# Patient Record
Sex: FEMALE | Race: WHITE | NOT HISPANIC OR LATINO | Employment: FULL TIME | URBAN - METROPOLITAN AREA
[De-identification: names, ages, dates, MRNs, and addresses within clinical notes are randomized per-mention and may not be internally consistent; named-entity substitution may affect disease eponyms.]

---

## 2017-04-19 NOTE — PROGRESS NOTES
Answers for HPI/ROS submitted by the patient on 4/22/2017   Annual Exam:  Getting at least 3 servings of Calcium per day:: Yes  Bi-annual eye exam:: Yes  Dental care twice a year:: Yes  Sleep apnea or symptoms of sleep apnea:: None  Diet:: Regular (no restrictions)  Frequency of exercise:: 2-3 days/week  Taking medications regularly:: Yes  Medication side effects:: Not applicable  Additional concerns today:: No  Q1: Little interest or pleasure in doing things: 0=Not at all  Q2: Feeling down, depressed or hopeless: 0=Not at all  PHQ-2 Score: 0  Duration of exercise:: 15-30 minutes

## 2017-04-25 ENCOUNTER — OFFICE VISIT (OUTPATIENT)
Dept: FAMILY MEDICINE | Facility: CLINIC | Age: 52
End: 2017-04-25
Payer: COMMERCIAL

## 2017-04-25 ENCOUNTER — TELEPHONE (OUTPATIENT)
Dept: FAMILY MEDICINE | Facility: CLINIC | Age: 52
End: 2017-04-25

## 2017-04-25 VITALS
WEIGHT: 135.5 LBS | BODY MASS INDEX: 24.01 KG/M2 | DIASTOLIC BLOOD PRESSURE: 80 MMHG | TEMPERATURE: 99.6 F | HEART RATE: 80 BPM | HEIGHT: 63 IN | SYSTOLIC BLOOD PRESSURE: 110 MMHG | RESPIRATION RATE: 8 BRPM

## 2017-04-25 DIAGNOSIS — R22.41 LEG MASS, RIGHT: ICD-10-CM

## 2017-04-25 DIAGNOSIS — H10.45 CHRONIC ALLERGIC CONJUNCTIVITIS: ICD-10-CM

## 2017-04-25 DIAGNOSIS — Z00.01 ENCOUNTER FOR ROUTINE ADULT HEALTH EXAMINATION WITH ABNORMAL FINDINGS: Primary | ICD-10-CM

## 2017-04-25 DIAGNOSIS — L40.9 PSORIASIS: ICD-10-CM

## 2017-04-25 DIAGNOSIS — Z12.31 ENCOUNTER FOR SCREENING MAMMOGRAM FOR BREAST CANCER: ICD-10-CM

## 2017-04-25 DIAGNOSIS — Z23 NEED FOR PROPHYLACTIC VACCINATION WITH COMBINED VACCINE: ICD-10-CM

## 2017-04-25 DIAGNOSIS — L40.9 PSORIASIS: Primary | ICD-10-CM

## 2017-04-25 LAB
CHOLEST SERPL-MCNC: 164 MG/DL
GLUCOSE SERPL-MCNC: 91 MG/DL (ref 70–99)
HDLC SERPL-MCNC: 50 MG/DL
LDLC SERPL CALC-MCNC: 100 MG/DL
NONHDLC SERPL-MCNC: 114 MG/DL
TRIGL SERPL-MCNC: 72 MG/DL
TSH SERPL DL<=0.005 MIU/L-ACNC: 1.03 MU/L (ref 0.4–4)

## 2017-04-25 PROCEDURE — 36415 COLL VENOUS BLD VENIPUNCTURE: CPT | Performed by: NURSE PRACTITIONER

## 2017-04-25 PROCEDURE — 82947 ASSAY GLUCOSE BLOOD QUANT: CPT | Performed by: NURSE PRACTITIONER

## 2017-04-25 PROCEDURE — 84443 ASSAY THYROID STIM HORMONE: CPT | Performed by: NURSE PRACTITIONER

## 2017-04-25 PROCEDURE — 90715 TDAP VACCINE 7 YRS/> IM: CPT | Performed by: NURSE PRACTITIONER

## 2017-04-25 PROCEDURE — 90471 IMMUNIZATION ADMIN: CPT | Performed by: NURSE PRACTITIONER

## 2017-04-25 PROCEDURE — 99386 PREV VISIT NEW AGE 40-64: CPT | Mod: 25 | Performed by: NURSE PRACTITIONER

## 2017-04-25 PROCEDURE — 80061 LIPID PANEL: CPT | Performed by: NURSE PRACTITIONER

## 2017-04-25 RX ORDER — OLOPATADINE HYDROCHLORIDE 1 MG/ML
SOLUTION/ DROPS OPHTHALMIC
Qty: 1 BOTTLE | Refills: 11 | Status: SHIPPED | OUTPATIENT
Start: 2017-04-25 | End: 2019-03-29

## 2017-04-25 RX ORDER — CLOBETASOL PROPIONATE 0.5 MG/G
OINTMENT TOPICAL
Qty: 45 G | Refills: 1 | Status: SHIPPED | OUTPATIENT
Start: 2017-04-25 | End: 2020-02-17

## 2017-04-25 RX ORDER — DESONIDE 0.5 MG/G
OINTMENT TOPICAL
Qty: 60 G | Refills: 1 | Status: SHIPPED | OUTPATIENT
Start: 2017-04-25 | End: 2017-04-26 | Stop reason: ALTCHOICE

## 2017-04-25 ASSESSMENT — PAIN SCALES - GENERAL: PAINLEVEL: NO PAIN (0)

## 2017-04-25 NOTE — NURSING NOTE
"Chief Complaint   Patient presents with     Physical     Rash on right leg, x2 months     Panel Management     Tdap       Initial /80  Pulse 80  Temp 99.6  F (37.6  C) (Temporal)  Resp 8  Ht 5' 2.6\" (1.59 m)  Wt 135 lb 8 oz (61.5 kg)  BMI 24.31 kg/m2 Estimated body mass index is 24.31 kg/(m^2) as calculated from the following:    Height as of this encounter: 5' 2.6\" (1.59 m).    Weight as of this encounter: 135 lb 8 oz (61.5 kg).  Medication Reconciliation: complete     Columba Dorsey CMA  "

## 2017-04-25 NOTE — TELEPHONE ENCOUNTER
Prior Authorization request received from   pharmacy for  desonide (DESOWEN) 0.05 % ointment .     Insurance company    phone # [phone] and member ID# [id number].    Would you like to proceed with prior authorization or change medication?     Rationale for PA request?    What medications has patient tried & failed?

## 2017-04-25 NOTE — NURSING NOTE
Prior to injection verified patient identity using patient's name and date of birth.  Screening Questionnaire for Adult Immunization    Are you sick today?   No   Do you have allergies to medications, food, a vaccine component or latex?   No   Have you ever had a serious reaction after receiving a vaccination?   No   Do you have a long-term health problem with heart disease, lung disease, asthma, kidney disease, metabolic disease (e.g. diabetes), anemia, or other blood disorder?   No   Do you have cancer, leukemia, HIV/AIDS, or any other immune system problem?   No   In the past 3 months, have you taken medications that affect  your immune system, such as prednisone, other steroids, or anticancer drugs; drugs for the treatment of rheumatoid arthritis, Crohn s disease, or psoriasis; or have you had radiation treatments?   No   Have you had a seizure, or a brain or other nervous system problem?   No   During the past year, have you received a transfusion of blood or blood     products, or been given immune (gamma) globulin or antiviral drug?   No   For women: Are you pregnant or is there a chance you could become        pregnant during the next month?   No   Have you received any vaccinations in the past 4 weeks?   No     Immunization questionnaire answers were all negative.      MNVFC doesn't apply on this patient    Per orders of Soha Lilly DNP, injection of Tdap given by Aleyda Henry. Patient instructed to remain in clinic for 20 minutes afterwards, and to report any adverse reaction to me immediately.       Screening performed by Aleyda Henry on 4/25/2017 at 8:56 AM.

## 2017-04-25 NOTE — MR AVS SNAPSHOT
After Visit Summary   4/25/2017    Gabriella Jeffries    MRN: [id number]           Patient Information     Date Of Birth          1965        Visit Information        Provider Department      4/25/2017 8:00 AM Soha Lilly APRN [facility]        Today's Diagnoses     Encounter for routine adult health examination with abnormal findings    -  1    Encounter for screening mammogram for breast cancer        Psoriasis        Leg mass, right          Care Instructions      Preventive Health Recommendations  Female Ages 50 - 64    Yearly exam: See your health care provider every year in order to  o Review health changes.   o Discuss preventive care.    o Review your medicines if your doctor has prescribed any.      Get a Pap test every three years (unless you have an abnormal result and your provider advises testing more often).    If you get Pap tests with HPV test, you only need to test every 5 years, unless you have an abnormal result.     You do not need a Pap test if your uterus was removed (hysterectomy) and you have not had cancer.    You should be tested each year for STDs (sexually transmitted diseases) if you're at risk.     Have a mammogram every 1 to 2 years.    Have a colonoscopy at age 50, or have a yearly FIT test (stool test). These exams screen for colon cancer.      Have a cholesterol test every 5 years, or more often if advised.    Have a diabetes test (fasting glucose) every three years. If you are at risk for diabetes, you should have this test more often.     If you are at risk for osteoporosis (brittle bone disease), think about having a bone density scan (DEXA).    Shots: Get a flu shot each year. Get a tetanus shot every 10 years.    Nutrition:     Eat at least 5 servings of fruits and vegetables each day.    Eat whole-grain bread, whole-wheat pasta and brown rice instead of white grains and rice.    Talk to your provider about Calcium and Vitamin D.      Lifestyle    Exercise at least 150 minutes a week (30 minutes a day, 5 days a week). This will help you control your weight and prevent disease.    Limit alcohol to one drink per day.    No smoking.     Wear sunscreen to prevent skin cancer.     See your dentist every six months for an exam and cleaning.    See your eye doctor every 1 to 2 years.          Follow-ups after your visit        Additional Services     DERMATOLOGY REFERRAL       Your provider has referred you to: [facility]: [facility] [phone]   [email or web address]    Please be aware that coverage of these services is subject to the terms and limitations of your health insurance plan.  Call member services at your health plan with any benefit or coverage questions.      Please bring the following with you to your appointment:    (1) Any X-Rays, CTs or MRIs which have been performed.  Contact the facility where they were done to arrange for  prior to your scheduled appointment.    (2) List of current medications  (3) This referral request   (4) Any documents/labs given to you for this referral            GENERAL SURG ADULT REFERRAL       Your provider has referred you to: [facility]: [facility] [phone]   [email or web address]  [facility] [facility] [phone]   [email or web address]    Please be aware that coverage of these services is subject to the terms and limitations of your health insurance plan.  Call member services at your health plan with any benefit or coverage questions.      Please bring the following with you to your appointment:    (1) Any X-Rays, CTs or MRIs which have been performed.  Contact the facility where they were done to arrange for  prior to your scheduled appointment.   (2) List of current  "medications   (3) This referral request   (4) Any documents/labs given to you for this referral                  Future tests that were ordered for you today     Open Future Orders        Priority Expected Expires Ordered    *MA Screening Digital Bilateral Routine  4/25/2018 4/25/2017            Who to contact     If you have questions or need follow up information about today's clinic visit or your schedule please contact [facility] directly at [phone].  Normal or non-critical lab and imaging results will be communicated to you by Network Physicshart, letter or phone within 4 business days after the clinic has received the results. If you do not hear from us within 7 days, please contact the clinic through Netliftt or phone. If you have a critical or abnormal lab result, we will notify you by phone as soon as possible.  Submit refill requests through Vaybee or call your pharmacy and they will forward the refill request to us. Please allow 3 business days for your refill to be completed.          Additional Information About Your Visit        Vaybee Information     Vaybee gives you secure access to your electronic health record. If you see a primary care provider, you can also send messages to your care team and make appointments. If you have questions, please call your primary care clinic.  If you do not have a primary care provider, please call [phone] and they will assist you.        Care EveryWhere ID     This is your Care EveryWhere ID. This could be used by other organizations to access your [place] medical records  [id number]        Your Vitals Were     Pulse Temperature Respirations Height BMI (Body Mass Index)       80 99.6  F (37.6  C) (Temporal) 8 5' 2.6\" (1.59 m) 24.31 kg/m2        Blood Pressure from Last 3 Encounters:   04/25/17 110/80   11/16/16 130/86   06/28/16 138/84    Weight from Last 3 Encounters:   04/25/17 135 lb 8 oz (61.5 kg)   11/16/16 160 lb (72.6 kg)   06/28/16 153 lb 14.4 " oz (69.8 kg)              We Performed the Following     DERMATOLOGY REFERRAL     GENERAL SURG ADULT REFERRAL     Glucose     Lipid panel reflex to direct LDL     TSH with free T4 reflex        Primary Care Provider Office Phone # Fax #    Erika Delgado [phone]       [facility] [address] [facility] [address]        Thank you!     Thank you for choosing [facility]  for your care. Our goal is always to provide you with excellent care. Hearing back from our patients is one way we can continue to improve our services. Please take a few minutes to complete the written survey that you may receive in the mail after your visit with us. Thank you!             Your Updated Medication List - Protect others around you: Learn how to safely use, store and throw away your medicines at www.disposemymeds.org.          This list is accurate as of: 4/25/17  8:38 AM.  Always use your most recent med list.                   Brand Name Dispense Instructions for use    benzonatate 100 MG capsule    TESSALON    42 capsule    Take 1 capsule (100 mg) by mouth 3 times daily as needed for cough       clobetasol 0.05 % ointment    TEMOVATE    45 g    Apply  topically 2 times daily as needed. Apply sparingly to affected area.  Do not apply to face.       desonide 0.05 % ointment    DESOWEN    1 Tube    apply BID to affected areas       nystatin 076457 UNIT/ML suspension    MYCOSTATIN    280 mL    Take 5 mLs (500,000 Units) by mouth 4 times daily       olopatadine 0.1 % ophthalmic solution    PATANOL     INSTILL 1 DROP INTO BOTH EYES TWICE A DAY AS NEEDED

## 2017-04-25 NOTE — PROGRESS NOTES
SUBJECTIVE:     CC: Gabriella Jeffries is an 51 year old woman who presents for preventive health visit.     Physical   Annual:     Getting at least 3 servings of Calcium per day::  Yes    Bi-annual eye exam::  Yes    Dental care twice a year::  Yes    Sleep apnea or symptoms of sleep apnea::  None    Diet::  Regular (no restrictions)    Frequency of exercise::  2-3 days/week    Duration of exercise::  15-30 minutes    Taking medications regularly::  Yes    Medication side effects::  Not applicable    Additional concerns today::  No    Patient states she is clean eating. Has cut out gluten, caffeine, and alcohol. Notes that she is eating at least 5 servings of dairy/calcium per day.     Rash on right leg, x 2 months. Has history of psoriasis on feet going up to knee and in groin. States this looks different than psoriasis she has had in the past. Ran out of ointments for psoriasis so has not tried anything. Rash is described as extremely itchy and burning. States she has had biopsies previously and nothing was ever found.     Patient also notes small lump she can feel underneath the skin of right outer thigh. States she has a history of calcifications. Feels this has been present for awhile, however she is unsure of exact length of time. She states she first noticed it a few weeks ago. States at times it is tender, but not painful.    Today's PHQ-2 Score:   PHQ-2 ( 1999 Pfizer) 4/25/2017   Q1: Little interest or pleasure in doing things 0   Q2: Feeling down, depressed or hopeless 0   PHQ-2 Score 0   Little interest or pleasure in doing things -   Feeling down, depressed or hopeless -   PHQ-2 Score -       Abuse: Current or Past(Physical, Sexual or Emotional)- No  Do you feel safe in your environment - Yes    Social History   Substance Use Topics     Smoking status: Never Smoker     Smokeless tobacco: Never Used     Alcohol use Yes      Comment: Social     The patient does not drink >3 drinks per day nor >7 drinks per  week.    Recent Labs   Lab Test  16   1441  08/30/10   0804   CHOL  203*  197   HDL  46*  48*   LDL  126*  125   TRIG  156*  121   CHOLHDLRATIO   --   4.1   NHDL  157*   --        Reviewed orders with patient.  Reviewed health maintenance and updated orders accordingly - Yes    Mammo Decision Support:  Patient over age 50, mutual decision to screen reflected in health maintenance.    Pertinent mammograms are reviewed under the imaging tab.  History of abnormal Pap smear: Status post benign hysterectomy. Health Maintenance and Surgical History updated.    Reviewed and updated as needed this visit by clinical staff  Reviewed and updated as needed this visit by Provider    Past Medical History:   Diagnosis Date     Bowel obstruction (H) 2016     Disorders of bursae and tendons in shoulder region, unspecified 2012     Enteritis      Mixed hyperlipemias      PONV (postoperative nausea and vomiting)      Psoriasis       Past Surgical History:   Procedure Laterality Date     ABDOMEN SURGERY      for bowel obstruction     C/SECTION, LOW TRANSVERSE      , Low Transverse     HYSTERECTOMY, VAGINAL  2006    has ovaries, due to bleeding     SHOULDER SURGERY Right 10/3/14    SAD/DCE     SURGICAL HISTORY OF -   2006    rectocele and TVT     SURGICAL HISTORY OF -       left lumpectomy       ROS:  CONSTITUTIONAL:POSITIVE  for intended weight loss and NEGATIVE  for chills, fatigue and fever   INTEGUMENTARY/SKIN: POSITIVE for pruritis foot bilateral, groin and lower legs bilateral and rash feet bilateral, groin and lower legs bilateral  E: NEGATIVE for vision changes or irritation  ENT: NEGATIVE for ear, mouth and throat problems  R: NEGATIVE for significant cough or SOB  B: NEGATIVE for masses, tenderness or discharge  CV: NEGATIVE for chest pain, palpitations or peripheral edema  GI: NEGATIVE for nausea, abdominal pain, heartburn, or change in bowel habits   female: no unusual urinary symptoms, no unusual  "vaginal symptoms and s/p hysterectomy  M: NEGATIVE for significant arthralgias or myalgia  N: NEGATIVE for weakness, dizziness or paresthesias  ENDOCRINE: POSITIVE  for hair loss  P: NEGATIVE for changes in mood or affect    OBJECTIVE:     /80  Pulse 80  Temp 99.6  F (37.6  C) (Temporal)  Resp 8  Ht 5' 2.6\" (1.59 m)  Wt 135 lb 8 oz (61.5 kg)  BMI 24.31 kg/m2  EXAM:  GENERAL: healthy, alert and no distress  EYES: Eyes grossly normal to inspection, PERRL and conjunctivae and sclerae normal  HENT: ear canals and TM's normal, nose and mouth without ulcers or lesions  NECK: no adenopathy, no asymmetry, masses, or scars and thyroid normal to palpation  RESP: lungs clear to auscultation - no rales, rhonchi or wheezes  BREAST: No discharge, tenderness, no palpable axillary masses or adenopathy and bilateral dense tissue at 12 o'clock  CV: regular rate and rhythm, normal S1 S2, no S3 or S4, no murmur, click or rub, no peripheral edema and peripheral pulses strong  ABDOMEN: soft, nontender, no hepatosplenomegaly, no masses and bowel sounds normal   (female): deferred. Patient declined groin exam to visualize rash.   MS: no gross musculoskeletal defects noted, no edema  SKIN: red plaque - right lower leg, yellow plaques on plantar aspect of bilateral feet,  Small less than 1 cm hardened area felt beneath the skin on upper right exterior thigh, non mobile, non tender, nonerythematous  NEURO: Normal strength and tone, mentation intact and speech normal  PSYCH: mentation appears normal, affect normal/bright    ASSESSMENT/PLAN:         ICD-10-CM    1. Encounter for routine adult health examination with abnormal findings Z00.01 TSH with free T4 reflex     Glucose     Lipid panel reflex to direct LDL   2. Encounter for screening mammogram for breast cancer Z12.31 *MA Screening Digital Bilateral   3. Psoriasis L40.9 DERMATOLOGY REFERRAL     clobetasol (TEMOVATE) 0.05 % ointment     DISCONTINUED: desonide (DESOWEN) 0.05 " "% ointment   4. Leg mass, right R22.41 GENERAL SURG ADULT REFERRAL   5. Chronic allergic conjunctivitis H10.45 olopatadine (PATANOL) 0.1 % ophthalmic solution   6. Need for prophylactic vaccination with combined vaccine Z23 TDAP VACCINE (ADACEL)     ADMIN 1st VACCINE       COUNSELING:  Reviewed preventive health counseling, as reflected in patient instructions  Special attention given to:        Regular exercise       Healthy diet/nutrition       Vaccinated for: TDAP       Osteoporosis Prevention/Bone Health-Encouraged calcium/vitamin D supplementation. Will consider Dexa scan  in the next few years       Colon cancer screening       (Ayana)menopause management  Routine screening for thyroid disorder, diabetes, and lipid disorder done today    Order placed for mammogram     reports that she has never smoked. She has never used smokeless tobacco.    Estimated body mass index is 24.31 kg/(m^2) as calculated from the following:    Height as of this encounter: 5' 2.6\" (1.59 m).    Weight as of this encounter: 135 lb 8 oz (61.5 kg).     Will reorder psoriasis topical medication for patient. Discussed that she may be a candidate for immunomodulator medications to help with psoriasis symptoms. Referral placed for dermatology to discuss these further.    It is unclear if this leg mass represents a calcification or a cyst. Will refer to general surgery to determine if it should be removed.    Will refill patanol for allergic conjunctivitis.     Counseling Resources:  ATP IV Guidelines  Pooled Cohorts Equation Calculator  Breast Cancer Risk Calculator  FRAX Risk Assessment  ICSI Preventive Guidelines  Dietary Guidelines for Americans, 2010  USDA's MyPlate  ASA Prophylaxis  Lung CA Screening    I, Abeba Hill RN, Student Nurse Practitioner, have seen and examined this patient with Soha Lilly, CNP, APRN and am serving as scribe for this visit. She has reviewed and revised documentation as needed.     Abeba Hill RN, " Student Nurse Practitioner    [facility]

## 2017-04-26 NOTE — TELEPHONE ENCOUNTER
Called [employer] to get alternative options. They will cover any of the following:    Hydrocortisone ointment, lotion or cream  Beta methasone augmented cream, lotion, gel or ointment  Beta methasone depropionade cream, lotion or ointment  Beta methasone valorate cream, lotion or ointment.

## 2017-04-28 ENCOUNTER — RADIANT APPOINTMENT (OUTPATIENT)
Dept: MAMMOGRAPHY | Facility: CLINIC | Age: 52
End: 2017-04-28
Attending: NURSE PRACTITIONER
Payer: COMMERCIAL

## 2017-04-28 DIAGNOSIS — Z12.31 ENCOUNTER FOR SCREENING MAMMOGRAM FOR BREAST CANCER: ICD-10-CM

## 2017-04-28 PROCEDURE — G0202 SCR MAMMO BI INCL CAD: HCPCS

## 2017-04-28 PROCEDURE — 77063 BREAST TOMOSYNTHESIS BI: CPT

## 2017-05-09 ENCOUNTER — TELEPHONE (OUTPATIENT)
Dept: FAMILY MEDICINE | Facility: CLINIC | Age: 52
End: 2017-05-09

## 2017-05-09 NOTE — TELEPHONE ENCOUNTER
You placed a referral for patient to Dermatology on 4/25/17.  Patient has not scheduled as of yet.      Please review and forward to team if follow up with the patient is needed.     Thank you!  Maame/Clinic Referrals Dyad II

## 2017-05-09 NOTE — LETTER
[facility]  [address]  [place] [address]  [phone]      May 9, 2017      [address]ella [address] [place] [address]        Dear Gabriella,    We received a notice that you are to be scheduled with a specialty clinic. The referral has been placed by your provider and you can call to schedule an appointment directly.     Enclosed, you will find the referral with the phone number to call to schedule an appointment.    Please call us if you have any questions or concerns.        Sincerely,    [facility] Team/pl

## 2017-09-18 ENCOUNTER — MYC MEDICAL ADVICE (OUTPATIENT)
Dept: FAMILY MEDICINE | Facility: CLINIC | Age: 52
End: 2017-09-18

## 2017-09-18 NOTE — TELEPHONE ENCOUNTER
Gabriella Jeffries is a 52 year old female who calls with Abdominal pain.    NURSING ASSESSMENT:  Description:  Abdominal pain is all alejandro below the belly button, but worse on the left side. Feels like there is a lump there internally.  appetite, increased tiredness, very thirsty, nausea. Painful to pass a BM on Saturday that was formed. Passing gas since . Has known IBS. Has not taken medication for pain, has tried a heating and rest. Feels bloated today, lower abdomin is tender. Denies fevers, diarrhea, constipation, lightheadedness, dizziness, vomiting, urine concerns, dysuria, external lump.   Onset/duration:  Pain started Saturday, run down feeling since   Pain scale (0-10)   1-2/10 cramping that is way worse than that, cramping that radiated through the body  LMP/preg/breast feeding:  No LMP recorded. Patient has had a hysterectomy.  Last exam/Treatment:  2017  Allergies:   Allergies   Allergen Reactions     No Known Drug Allergy      NURSING PLAN: Nursing advice to patient to be seen in 24 hours for lower abdominal pain    RECOMMENDED DISPOSITION:  See in 24 hours - to be evaluated  Will comply with recommendation: Yes  If further questions/concerns or if symptoms do not improve, worsen or new symptoms develop, call your PCP or [facility] as soon as possible.    NOTES:  Disposition was determined by the first positive assessment question, therefore all previous assessment questions were negative    Guideline used:  Telephone Triage Protocols for Nurses, Fifth Edition, Dora Cooper  Abdominal pain, Adult  Nursing Judgment    Yoli Rosenthal RN, BSN

## 2017-09-18 NOTE — PROGRESS NOTES
"  SUBJECTIVE:                                                    Gabriella Jeffries is a 52 year old female who presents to clinic today for the following health issues:    ABDOMINAL   PAIN     Onset: 4 days, off and on for few weeks     Description:   Character: Sharp, Dull ache, Stabbing \"it depends\"   Location: low abdomen, more to the left side, it depends    Radiation: None    Intensity: now is 1/10, \"yesterday I feel like someone cutting me in half\"    Progression of Symptoms:  intermittent    Accompanying Signs & Symptoms:  Fever/Chills?: no   Gas/Bloating: YES- bloating   Nausea: YES  Vomitting: no   Diarrhea?: no   Constipation:no   Dysuria or Hematuria: no    History:   Trauma: no   Previous similar pain: YES   Previous tests done: 1.5 year patient states she had obstruction and had to do surgery     Precipitating factors:   Does the pain change with:     Food: YES- added slowly gluten back to diet last week, patient unsure if this trigger the pain      BM: YES- make it worse     Urination: no     Alleviating factors:  None     Therapies Tried and outcome: none      LMP:  not applicable     GI/Abdomen  The patient states that her LUQ abdominal pain is a lot better today, rated as a 0-1/10. She explains that her pain presented 15-20 minutes after eating out for lunch on Saturday (pretzel, steak bites, and green beans) and felt like someone was \"tearing her apart\", a pain which she rated as 10/10.  Bowel movements have been infrequent and exacerbate her abdominal pain. She states that the pain reduced on Sunday but was still present, especially when she turned over in bed at night. She also explains that her pain is mostly restricted to the LUQ of her abdomen and radiates to her left side. She states that she hasn't eaten a lot since Saturday (today: had an egg and protein shake, yesterday: protein shake and chicken/green beans). She notes that she has had nausea anytime she eats since onset of the abdominal " pain.     Furthermore, she denies fever, weight loss, vomiting, pain when eating, or dysuria.     Family History  The patient states that her father () developed colon cancer when he was in his 80's.     Problem list and histories reviewed & adjusted, as indicated.  Additional history: as documented    Patient Active Problem List   Diagnosis     Psoriasis     CARDIOVASCULAR SCREENING; LDL GOAL LESS THAN 160     Abdominal pain     Vaginal odor     Incontinence     IBS (irritable colon syndrome)     Abnormal LFTs     Disorder of bursae and tendons in shoulder region     Left shoulder pain     Esophageal reflux     H/O: hysterectomy     Past Surgical History:   Procedure Laterality Date     ABDOMEN SURGERY  2016    lysis of adhesions for bowel obstruction     C/SECTION, LOW TRANSVERSE      , Low Transverse     HYSTERECTOMY, VAGINAL      has ovaries, due to bleeding     SHOULDER SURGERY Right 10/3/14    SAD/DCE     SURGICAL HISTORY OF -   2006    rectocele and TVT     SURGICAL HISTORY OF -       left lumpectomy       Social History   Substance Use Topics     Smoking status: Never Smoker     Smokeless tobacco: Never Used     Alcohol use No      Comment: Social     Family History   Problem Relation Age of Onset     HEART DISEASE Mother      defibrillator     Cardiovascular Mother      defibrillator     Cardiovascular Father      valve replacement     Hypertension Father      Eye Disorder Father      maccular degeneration     HEART DISEASE Father      valve replacement     Lipids Father      Genitourinary Problems Brother      CKD     CANCER Brother      leukemia     Asthma No family hx of      C.A.D. No family hx of      DIABETES No family hx of      CEREBROVASCULAR DISEASE No family hx of      Breast Cancer No family hx of      Cancer - colorectal No family hx of      Prostate Cancer No family hx of      Alzheimer Disease No family hx of      Arthritis No family hx of      Blood Disease No  "family hx of      Circulatory No family hx of      GASTROINTESTINAL DISEASE No family hx of      Musculoskeletal Disorder No family hx of      Neurologic Disorder No family hx of      Respiratory No family hx of      Thyroid Disease No family hx of      Alcohol/Drug No family hx of      Allergies No family hx of      Anesthesia Reaction No family hx of      Congenital Anomalies No family hx of      Connective Tissue Disorder No family hx of      Other Cancer No family hx of      Depression No family hx of      Anxiety Disorder No family hx of      MENTAL ILLNESS No family hx of      Substance Abuse No family hx of      OSTEOPOROSIS No family hx of      Genetic Disorder No family hx of      Obesity No family hx of          Current Outpatient Prescriptions   Medication Sig Dispense Refill     betamethasone valerate (VALISONE) 0.1 % ointment Apply topically 2 times daily 60 g 1     olopatadine (PATANOL) 0.1 % ophthalmic solution INSTILL 1 DROP INTO BOTH EYES TWICE A DAY AS NEEDED 1 Bottle 11     clobetasol (TEMOVATE) 0.05 % ointment Apply  topically 2 times daily as needed. Apply sparingly to affected area.  Do not apply to face. 45 g 1     ROS:  Constitutional, neuro, ENT, endocrine, pulmonary, cardiac, gastrointestinal, genitourinary, musculoskeletal, integument and psychiatric systems are negative, except as otherwise noted.    This document serves as a record of the services and decisions personally performed and made by Soha Lilly DNP. It was created on her behalf by Shawn Bowman, a trained medical scribe. The creation of this document is based on the provider's statements to the medical scribe.  Shawn Bowman 12:53 PM September 19, 2017    OBJECTIVE:                                                    /74  Pulse 72  Temp 99.2  F (37.3  C) (Temporal)  Resp 16  Ht 5' 2.5\" (1.588 m)  Wt 127 lb (57.6 kg)  BMI 22.86 kg/m2  Body mass index is 22.86 kg/(m^2).     GENERAL APPEARANCE: healthy, alert and " no distress  HENT: ear canals and TM's normal and nose and mouth without ulcers or lesions  NECK: no adenopathy, no asymmetry, masses, or scars and thyroid normal to palpation  RESP: lungs clear to auscultation - no rales, rhonchi or wheezes  CV: regular rates and rhythm, normal S1 S2, no S3 or S4 and no murmur, click or rub  ABDOMEN: exquisite pain to palpation of RLQ and LUQ otherwise soft, without hepatosplenomegaly or masses and bowel sounds normal  SKIN: no suspicious lesions or to visible skin  NEURO: Normal strength and tone, mentation intact and speech normal  PSYCH: mentation appears normal and affect normal/bright    No results found for this or any previous visit (from the past 24 hour(s)).       ASSESSMENT/PLAN:                                                        ICD-10-CM    1. LLQ abdominal pain R10.32 GASTROENTEROLOGY ADULT REF CONSULT ONLY     CANCELED: CT Abdomen Pelvis w Contrast     CANCELED: CBC with platelets and differential     CANCELED: Lipase     CANCELED: Amylase     CANCELED: Comprehensive metabolic panel     CANCELED: CBC with platelets and differential     CANCELED: Lipase     CANCELED: Amylase     CANCELED: Comprehensive metabolic panel   2. RUQ abdominal pain R10.11        Initially discussed outpt. Work up, but on exam is brought to tears with light/moderate depth palpation. Discussed needing ED evaluation and pt. Is agreeable. She is able to drive by private car. Advised to go straight to ED and remain NPO.   Report given Shaheen Hatfield ( sp?) at [facility] [place] [facility] ED.    Deferring flu vaccine.     GI follow-up is advised.     Follow-up per ED.     The information in this document, created by the medical scribe for me, accurately reflects the services I personally performed and the decisions made by me. I have reviewed and approved this document for accuracy prior to leaving the patient care area.  September 19, 2017 12:17 PM    NUNU [facility]o [facility]

## 2017-09-19 ENCOUNTER — TELEPHONE (OUTPATIENT)
Dept: FAMILY MEDICINE | Facility: CLINIC | Age: 52
End: 2017-09-19

## 2017-09-19 ENCOUNTER — OFFICE VISIT (OUTPATIENT)
Dept: FAMILY MEDICINE | Facility: CLINIC | Age: 52
End: 2017-09-19
Payer: COMMERCIAL

## 2017-09-19 VITALS
WEIGHT: 127 LBS | SYSTOLIC BLOOD PRESSURE: 124 MMHG | TEMPERATURE: 99.2 F | HEIGHT: 63 IN | RESPIRATION RATE: 16 BRPM | BODY MASS INDEX: 22.5 KG/M2 | DIASTOLIC BLOOD PRESSURE: 74 MMHG | HEART RATE: 72 BPM

## 2017-09-19 DIAGNOSIS — R10.11 RUQ ABDOMINAL PAIN: ICD-10-CM

## 2017-09-19 DIAGNOSIS — R10.32 LLQ ABDOMINAL PAIN: Primary | ICD-10-CM

## 2017-09-19 PROCEDURE — 99214 OFFICE O/P EST MOD 30 MIN: CPT | Performed by: NURSE PRACTITIONER

## 2017-09-19 ASSESSMENT — PAIN SCALES - GENERAL: PAINLEVEL: NO PAIN (1)

## 2017-09-19 NOTE — MR AVS SNAPSHOT
After Visit Summary   9/19/2017    Gabriella Jeffries    MRN: [id number]           Patient Information     Date Of Birth          1965        Visit Information        Provider Department      9/19/2017 11:40 AM Soha Lilly APRN CNP [facility]        Today's Diagnoses     LLQ abdominal pain    -  1    RUQ abdominal pain           Follow-ups after your visit        Additional Services     GASTROENTEROLOGY ADULT REF CONSULT ONLY       Preferred Location: [facility] [phone], [facility]: [phone], [facility] [phone] and [facility] [phone]      Please be aware that coverage of these services is subject to the terms and limitations of your health insurance plan.  Call member services at your health plan with any benefit or coverage questions.  Any procedures must be performed at a [facility] facility OR coordinated by your clinic's referral office.    Please bring the following with you to your appointment:    (1) Any X-Rays, CTs or MRIs which have been performed.  Contact the facility where they were done to arrange for  prior to your scheduled appointment.    (2) List of current medications   (3) This referral request   (4) Any documents/labs given to you for this referral                  Who to contact     If you have questions or need follow up information about today's clinic visit or your schedule please contact [facility] directly at [phone].  Normal or non-critical lab and imaging results will be communicated to you by MyChart, letter or phone within 4 business days after the clinic has received the results. If you do not hear from us within 7 days, please contact the clinic through MyChart or phone. If you have a critical or abnormal lab result, we will notify you by phone as soon as possible.  Submit refill requests through [facility] or call your pharmacy and they will forward the refill request to us.  "Please allow 3 business days for your refill to be completed.          Additional Information About Your Visit        MyChart Information     clipsynchart gives you secure access to your electronic health record. If you see a primary care provider, you can also send messages to your care team and make appointments. If you have questions, please call your primary care clinic.  If you do not have a primary care provider, please call [phone] and they will assist you.        Care EveryWhere ID     This is your Care EveryWhere ID. This could be used by other organizations to access your [facility] medical records  [id number]        Your Vitals Were     Pulse Temperature Respirations Height BMI (Body Mass Index)       72 99.2  F (37.3  C) (Temporal) 16 5' 2.5\" (1.588 m) 22.86 kg/m2        Blood Pressure from Last 3 Encounters:   09/19/17 124/74   04/25/17 110/80   11/16/16 130/86    Weight from Last 3 Encounters:   09/19/17 127 lb (57.6 kg)   04/25/17 135 lb 8 oz (61.5 kg)   11/16/16 160 lb (72.6 kg)              We Performed the Following     GASTROENTEROLOGY ADULT REF CONSULT ONLY        Primary Care Provider Office Phone # Fax #    Erika Delgado [phone] [address] [facility] [address]        Equal Access to Services     [facility] LILIANE : Hadii aad ku hadasho Soomaali, waaxda luqadaha, qaybta kaalmada adeegyada, waxay belen haymicaela trevizo. So [facility] [place] [phone].    ATENCIÓN: Si habla español, tiene a pompa disposición servicios gratuitos de asistencia lingüística. Llsameer al [phone].    We comply with applicable federal civil rights laws and [place] laws. We do not discriminate on the basis of race, color, national origin, age, disability sex, sexual orientation or gender identity.            Thank you!     Thank you for choosing [facility]  for your care. Our goal is always to provide you with excellent care. Hearing back from our patients is one way we can " continue to improve our services. Please take a few minutes to complete the written survey that you may receive in the mail after your visit with us. Thank you!             Your Updated Medication List - Protect others around you: Learn how to safely use, store and throw away your medicines at www.disposemymeds.org.          This list is accurate as of: 9/19/17  4:56 PM.  Always use your most recent med list.                   Brand Name Dispense Instructions for use Diagnosis    betamethasone valerate 0.1 % ointment    VALISONE    60 g    Apply topically 2 times daily    Psoriasis       clobetasol 0.05 % ointment    TEMOVATE    45 g    Apply  topically 2 times daily as needed. Apply sparingly to affected area.  Do not apply to face.    Psoriasis       olopatadine 0.1 % ophthalmic solution    PATANOL    1 Bottle    INSTILL 1 DROP INTO BOTH EYES TWICE A DAY AS NEEDED    Chronic allergic conjunctivitis

## 2017-09-19 NOTE — NURSING NOTE
"Chief Complaint   Patient presents with     Panel Management     flu     Abdominal Pain       Initial /74  Pulse 72  Temp 99.2  F (37.3  C) (Temporal)  Resp 16  Ht 5' 2.5\" (1.588 m)  Wt 127 lb (57.6 kg)  BMI 22.86 kg/m2 Estimated body mass index is 22.86 kg/(m^2) as calculated from the following:    Height as of this encounter: 5' 2.5\" (1.588 m).    Weight as of this encounter: 127 lb (57.6 kg).  Medication Reconciliation: complete       Levon Mistry MA    "

## 2017-09-20 NOTE — TELEPHONE ENCOUNTER
Left detailed message informing patient.   Gave her all the numbers to call and schedule an appt - Preferred Location: [facility] [phone], [facility]: [phone], [facility] [phone] and [facility] [phone]

## 2017-09-20 NOTE — TELEPHONE ENCOUNTER
"Spoke with pt.  She states she feels much better today.  She states they took a CT scan and did blood work in the ER.  They told her she had a \"blockage\" which eventually resolved itself.  Pt is interested in the referral to gastroenterology if you feel she still needs to see them.    Will route to Soha Lilly CNP.    Zeny Kc RN    "

## 2017-09-27 ENCOUNTER — TRANSFERRED RECORDS (OUTPATIENT)
Dept: HEALTH INFORMATION MANAGEMENT | Facility: CLINIC | Age: 52
End: 2017-09-27

## 2017-10-11 ENCOUNTER — TRANSFERRED RECORDS (OUTPATIENT)
Dept: HEALTH INFORMATION MANAGEMENT | Facility: CLINIC | Age: 52
End: 2017-10-11

## 2017-12-04 ENCOUNTER — VIRTUAL VISIT (OUTPATIENT)
Dept: FAMILY MEDICINE | Facility: OTHER | Age: 52
End: 2017-12-04

## 2017-12-04 NOTE — PROGRESS NOTES
"Date:   Clinician: Camacho Saldaña  Clinician NPI: [id number]  Patient: Gabriella Jeffries  Patient : 1965  Patient Address: [address] [place] [address], [place] [address]  Patient Phone: [phone]  Visit Protocol: URI  Patient Summary:  Gabriella is a 52 year old ( : 1965 ) female who initiated a Visit for cold, sinus infection, or influenza. When asked the question \"Please sign me up to receive news, health information and promotions. \", Gabriella responded \"No\".   A synchronous visit is necessary because the patient reported the following abnormal symptoms:   Bloody mucus (requires clarification)   Gabriella states her symptoms started gradually 7-9 days ago.   Her symptoms consist of malaise, a headache, nasal congestion, tooth pain, chills, rhinitis, myalgia, and facial pain or pressure.   Symptom Details     Nasal secretions: The color of her mucus is blood-tinged, green, yellow, and bloody.    Facial pain or pressure: The facial pain or pressure feels worse when bending over or leaning forward.     Headache: She states the headache is moderate.     Tooth pain: The tooth pain is not caused by a cavity, recent dental work, or other mouth problems.      Gabriella denies having dyspnea, ear pain, fever, wheezing, cough, and sore throat. She also denies taking antibiotic medication for the symptoms, having recent facial or sinus surgery in the past 60 days, and double sickening.   She has not recently been exposed to someone with influenza. Gabriella has not been in close contact with any high risk individuals.   Weight: 125 lbs   Gabriella does not smoke or use smokeless tobacco.   She denies pregnancy and denies breastfeeding. She does not menstruate.    MEDICATIONS:  No current medications , ALLERGIES:  NKDA   Clinician Response:  Dear Gabriella,  Based on the information you have provided, you likely have  acute bacterial sinusitis, otherwise known as a sinus infection.  I am prescribing amoxicillin-Pot Clavulanate " [Augmentin] 875-125mg. Take one tablet by mouth two times a day for 10 days. There are no refills with this prescription.   Sinus pressure occurs when the tissues lining your sinuses become swollen and inflamed. Afrin nasal spray decreases the swelling to provide the quickest and most effective relief from sinus pressure.  Use oxymetazoline (Afrin or store brand) nasal spray. Spray once in each nostril twice per day for a maximum of 3 days. Using this medication more frequently or longer than recommended may cause nasal congestion to reoccur or worsen. This is an over-the-counter medication you can find at most pharmacies.   Some people develop allergies to antibiotics. If you have significant swelling or difficulty breathing, stop the medication immediately and call 911 or go to an emergency room. If you notice a new rash, be seen at a clinic or urgent care.  Some women may also develop a yeast infection as a side effect of taking antibiotics. If you notice symptoms of a yeast infection, please use [facility] to get treatment.  If you become pregnant during this course of treatment, stop taking the medication and contact your primary care provider.  You will feel better faster if you take care of yourself by getting more rest and drinking plenty of liquids, especially water.  Remember to wash your hands often and stay home while you are sick to decrease the chance you will spread your infection to others.   Diagnosis: Acute bacterial sinusitis  Diagnosis ICD: J01.90  Triage Notes: Patient is getting a sore in her nose that bleeds temporarily. We talked about how to treat this. Patient verified her name and date of birth  Synchronous Triage: phone, status: completed, duration: 132 seconds  Prescription: amoxicillin-Pot Clavulanate (Augmentin) 875-125 mg oral tablet 20 tablets, 10 days supply. Take one tablet by mouth two times a day for 10 days. Refills: 0, Refill as needed: no, Allow substitutions: yes  Pharmacy:  [id number] - [phone] - [address], [place] [address]

## 2018-01-05 ENCOUNTER — MYC MEDICAL ADVICE (OUTPATIENT)
Dept: FAMILY MEDICINE | Facility: CLINIC | Age: 53
End: 2018-01-05

## 2018-01-05 DIAGNOSIS — N64.4 BREAST PAIN, LEFT: Primary | ICD-10-CM

## 2018-01-09 ENCOUNTER — RADIANT APPOINTMENT (OUTPATIENT)
Dept: ULTRASOUND IMAGING | Facility: CLINIC | Age: 53
End: 2018-01-09
Attending: FAMILY MEDICINE
Payer: COMMERCIAL

## 2018-01-09 ENCOUNTER — RADIANT APPOINTMENT (OUTPATIENT)
Dept: MAMMOGRAPHY | Facility: CLINIC | Age: 53
End: 2018-01-09
Attending: FAMILY MEDICINE
Payer: COMMERCIAL

## 2018-01-09 DIAGNOSIS — N64.4 BREAST PAIN, LEFT: ICD-10-CM

## 2018-01-09 PROCEDURE — 77065 DX MAMMO INCL CAD UNI: CPT | Performed by: RADIOLOGY

## 2018-01-09 PROCEDURE — G0279 TOMOSYNTHESIS, MAMMO: HCPCS | Performed by: RADIOLOGY

## 2018-01-09 PROCEDURE — 76641 ULTRASOUND BREAST COMPLETE: CPT | Mod: LT | Performed by: RADIOLOGY

## 2018-04-30 ENCOUNTER — MYC MEDICAL ADVICE (OUTPATIENT)
Dept: FAMILY MEDICINE | Facility: CLINIC | Age: 53
End: 2018-04-30

## 2018-04-30 DIAGNOSIS — L98.9 SKIN LESION: Primary | ICD-10-CM

## 2018-05-02 ENCOUNTER — TRANSFERRED RECORDS (OUTPATIENT)
Dept: HEALTH INFORMATION MANAGEMENT | Facility: CLINIC | Age: 53
End: 2018-05-02

## 2018-08-28 ENCOUNTER — MYC MEDICAL ADVICE (OUTPATIENT)
Dept: FAMILY MEDICINE | Facility: CLINIC | Age: 53
End: 2018-08-28

## 2018-11-20 ENCOUNTER — OFFICE VISIT (OUTPATIENT)
Dept: FAMILY MEDICINE | Facility: CLINIC | Age: 53
End: 2018-11-20
Payer: COMMERCIAL

## 2018-11-20 VITALS
BODY MASS INDEX: 26.58 KG/M2 | OXYGEN SATURATION: 98 % | DIASTOLIC BLOOD PRESSURE: 90 MMHG | RESPIRATION RATE: 18 BRPM | HEART RATE: 92 BPM | HEIGHT: 63 IN | SYSTOLIC BLOOD PRESSURE: 152 MMHG | TEMPERATURE: 98.4 F | WEIGHT: 150 LBS

## 2018-11-20 DIAGNOSIS — J06.9 VIRAL UPPER RESPIRATORY INFECTION: Primary | ICD-10-CM

## 2018-11-20 DIAGNOSIS — R21 EXANTHEM: ICD-10-CM

## 2018-11-20 PROCEDURE — 99213 OFFICE O/P EST LOW 20 MIN: CPT | Performed by: PHYSICIAN ASSISTANT

## 2018-11-20 RX ORDER — OXYMETAZOLINE HYDROCHLORIDE 0.05 G/100ML
2-3 SPRAY NASAL 2 TIMES DAILY PRN
Qty: 1 BOTTLE | Refills: 0 | Status: SHIPPED | OUTPATIENT
Start: 2018-11-20 | End: 2019-03-29

## 2018-11-20 RX ORDER — CODEINE PHOSPHATE AND GUAIFENESIN 10; 100 MG/5ML; MG/5ML
1 SOLUTION ORAL EVERY 4 HOURS PRN
Qty: 120 ML | Refills: 0 | Status: SHIPPED | OUTPATIENT
Start: 2018-11-20 | End: 2019-03-29

## 2018-11-20 ASSESSMENT — PAIN SCALES - GENERAL: PAINLEVEL: MODERATE PAIN (4)

## 2018-11-20 NOTE — PROGRESS NOTES
"  SUBJECTIVE:   Gabriella Jeffries is a 53 year old female who presents to clinic today for the following health issues:      HPI  Acute Illness   Acute illness concerns: Cough, sinus pressure and pain  Onset: 5 days    Fever: no    Chills/Sweats: YES    Headache (location?): YES    Sinus Pressure:YES    Conjunctivitis:  YES: right    Ear Pain: YES: both    Rhinorrhea: YES    Congestion: YES    Sore Throat: YES     Cough: YES    Wheeze: no    Decreased Appetite: YES    Nausea: no    Vomiting: no    Diarrhea:  no    Dysuria/Freq.: no    Fatigue/Achiness: YES    Sick/Strep Exposure: YES- daghter     Therapies Tried and outcome: dayquil. Nyquil. Lots of OTC meds, do not help.     Problem list and histories reviewed & adjusted, as indicated.  Additional history: as documented    ROS:  Constitutional, HEENT, cardiovascular, pulmonary, gi and gu systems are negative, except as otherwise noted.    OBJECTIVE:     /90  Pulse 92  Temp 98.4  F (36.9  C) (Temporal)  Resp 18  Ht 5' 2.5\" (1.588 m)  Wt 150 lb (68 kg)  SpO2 98%  BMI 27 kg/m2  Body mass index is 27 kg/(m^2).  GEN: Well developed, well nourished in NAD.  HEENT: Normocephalic. Eyes: + conjunctival flushing noted. EARS: TMs WNL, Canals clear.  Nose: Edematous mucosa without lesion. Yellow rhinorrhea. Mouth/Pharynx: + cobblestoning and telangiectasia. + Percussed Frontomaxillary Sinus tenderness.  NECK: Supple with No anterior cervical lymphadenopathy.  No Thyromegaly  RESP: CTA with good air entry all fields.  SKIN: Scattered erythematous 2-3 mm excoriated papules on bilat upper extremeties, rare.      Diagnostic Test Results:  none     ASSESSMENT/PLAN:   1. Viral upper respiratory infection  - oxymetazoline (AFRIN NASAL SPRAY) 0.05 % spray; Spray 2-3 sprays into both nostrils 2 times daily as needed for congestion  Dispense: 1 Bottle; Refill: 0  - guaiFENesin-codeine (ROBITUSSIN AC) 100-10 MG/5ML SOLN solution; Take 5 mLs by mouth every 4 hours as needed for " cough  Dispense: 120 mL; Refill: 0    2. Exanthem        Use medication as directed.  Continue supportive OTC measures.  Follow up if symptoms should persist, change or worsen.  Patient amenable to this follow up plan.     Kavon Figueroa PA-C  [facility]

## 2018-11-20 NOTE — MR AVS SNAPSHOT
"              After Visit Summary   11/20/2018    Gabriella Jeffries    MRN: [id number]           Patient Information     Date Of Birth          1965        Visit Information        Provider Department      11/20/2018 11:20 AM Kavon Figueroa PA-C [place] [facility]        Today's Diagnoses     Viral upper respiratory infection    -  1    Exanthem           Follow-ups after your visit        Who to contact     If you have questions or need follow up information about today's clinic visit or your schedule please contact [facility] directly at [phone].  Normal or non-critical lab and imaging results will be communicated to you by K[employer], letter or phone within 4 business days after the clinic has received the results. If you do not hear from us within 7 days, please contact the clinic through [employer] or phone. If you have a critical or abnormal lab result, we will notify you by phone as soon as possible.  Submit refill requests through "[employer] or call your pharmacy and they will forward the refill request to us. Please allow 3 business days for your refill to be completed.          Additional Information About Your Visit        MyChart Information     "[employer] gives you secure access to your electronic health record. If you see a primary care provider, you can also send messages to your care team and make appointments. If you have questions, please call your primary care clinic.  If you do not have a primary care provider, please call [phone] and they will assist you.        Care EveryWhere ID     This is your Care EveryWhere ID. This could be used by other organizations to access your [place] medical records  [id number]        Your Vitals Were     Pulse Temperature Respirations Height Pulse Oximetry BMI (Body Mass Index)    92 98.4  F (36.9  C) (Temporal) 18 5' 2.5\" (1.588 m) 98% 27 kg/m2       Blood Pressure from Last 3 Encounters:   11/20/18 152/90   09/19/17 124/74   04/25/17 " 110/80    Weight from Last 3 Encounters:   11/20/18 150 lb (68 kg)   09/19/17 127 lb (57.6 kg)   04/25/17 135 lb 8 oz (61.5 kg)              Today, you had the following     No orders found for display         Today's Medication Changes          These changes are accurate as of 11/20/18  1:07 PM.  If you have any questions, ask your nurse or doctor.               Start taking these medicines.        Dose/Directions    guaiFENesin-codeine 100-10 MG/5ML Soln solution   Commonly known as:  ROBITUSSIN AC   Used for:  Viral upper respiratory infection   Started by:  Kavon Figueroa PA-C        Dose:  1 tsp.   Take 5 mLs by mouth every 4 hours as needed for cough   Quantity:  120 mL   Refills:  0       oxymetazoline 0.05 % spray   Commonly known as:  AFRIN NASAL SPRAY   Used for:  Viral upper respiratory infection   Started by:  Kavon Figueroa PA-C        Dose:  2-3 spray   Spray 2-3 sprays into both nostrils 2 times daily as needed for congestion   Quantity:  1 Bottle   Refills:  0            Where to get your medicines      These medications were sent to [id number] - [place], [place] - [address] AT [facility] &  ( [facility])  [address] , [place] [address]     Phone:  [phone]     oxymetazoline 0.05 % spray         Some of these will need a paper prescription and others can be bought over the counter.  Ask your nurse if you have questions.     Bring a paper prescription for each of these medications     guaiFENesin-codeine 100-10 MG/5ML Soln solution                Primary Care Provider Office Phone # Fax #    Erika Delgado [phone] [address] [facility] [address]        Equal Access to Services     SALUD MOMIN AH: Dylon williamson Sokenny, wamatiasda obdulia, qaybta kaalmada aderashidda, ange trevizo. So [facility] [phone].    ATENCIÓN: Si habla español, tiene a pompa disposición servicios gratuitos  de asistencia lingüística. Jacquelyn valenzuela [phone].    We comply with applicable federal civil rights laws and [place] laws. We do not discriminate on the basis of race, color, national origin, age, disability, sex, sexual orientation, or gender identity.            Thank you!     Thank you for choosing [facility]  for your care. Our goal is always to provide you with excellent care. Hearing back from our patients is one way we can continue to improve our services. Please take a few minutes to complete the written survey that you may receive in the mail after your visit with us. Thank you!             Your Updated Medication List - Protect others around you: Learn how to safely use, store and throw away your medicines at www.disposemymeds.org.          This list is accurate as of 11/20/18  1:07 PM.  Always use your most recent med list.                   Brand Name Dispense Instructions for use Diagnosis    betamethasone valerate 0.1 % ointment    VALISONE    60 g    Apply topically 2 times daily    Psoriasis       clobetasol 0.05 % ointment    TEMOVATE    45 g    Apply  topically 2 times daily as needed. Apply sparingly to affected area.  Do not apply to face.    Psoriasis       guaiFENesin-codeine 100-10 MG/5ML Soln solution    ROBITUSSIN AC    120 mL    Take 5 mLs by mouth every 4 hours as needed for cough    Viral upper respiratory infection       olopatadine 0.1 % ophthalmic solution    PATANOL    1 Bottle    INSTILL 1 DROP INTO BOTH EYES TWICE A DAY AS NEEDED    Chronic allergic conjunctivitis       oxymetazoline 0.05 % spray    AFRIN NASAL SPRAY    1 Bottle    Spray 2-3 sprays into both nostrils 2 times daily as needed for congestion    Viral upper respiratory infection

## 2018-11-28 ENCOUNTER — MYC MEDICAL ADVICE (OUTPATIENT)
Dept: FAMILY MEDICINE | Facility: CLINIC | Age: 53
End: 2018-11-28

## 2018-11-28 DIAGNOSIS — J20.9 ACUTE BRONCHITIS WITH SYMPTOMS > 10 DAYS: Primary | ICD-10-CM

## 2018-11-28 RX ORDER — AZITHROMYCIN 500 MG/1
500 TABLET, FILM COATED ORAL DAILY
Qty: 3 TABLET | Refills: 0 | Status: SHIPPED | OUTPATIENT
Start: 2018-11-28 | End: 2018-12-04

## 2018-12-04 ENCOUNTER — OFFICE VISIT (OUTPATIENT)
Dept: FAMILY MEDICINE | Facility: CLINIC | Age: 53
End: 2018-12-04
Payer: COMMERCIAL

## 2018-12-04 VITALS
OXYGEN SATURATION: 98 % | WEIGHT: 150 LBS | SYSTOLIC BLOOD PRESSURE: 104 MMHG | HEIGHT: 63 IN | HEART RATE: 79 BPM | RESPIRATION RATE: 16 BRPM | DIASTOLIC BLOOD PRESSURE: 80 MMHG | BODY MASS INDEX: 26.58 KG/M2 | TEMPERATURE: 98.1 F

## 2018-12-04 DIAGNOSIS — K59.00 CONSTIPATION, UNSPECIFIED CONSTIPATION TYPE: ICD-10-CM

## 2018-12-04 DIAGNOSIS — J18.0 BRONCHOPNEUMONIA: Primary | ICD-10-CM

## 2018-12-04 DIAGNOSIS — R10.32 LLQ ABDOMINAL PAIN: ICD-10-CM

## 2018-12-04 LAB
ALBUMIN UR-MCNC: NEGATIVE MG/DL
APPEARANCE UR: CLEAR
BACTERIA #/AREA URNS HPF: ABNORMAL /HPF
BASOPHILS # BLD AUTO: 0 10E9/L (ref 0–0.2)
BASOPHILS NFR BLD AUTO: 0.3 %
BILIRUB UR QL STRIP: NEGATIVE
COLOR UR AUTO: YELLOW
DIFFERENTIAL METHOD BLD: ABNORMAL
EOSINOPHIL # BLD AUTO: 0.2 10E9/L (ref 0–0.7)
EOSINOPHIL NFR BLD AUTO: 1.7 %
ERYTHROCYTE [DISTWIDTH] IN BLOOD BY AUTOMATED COUNT: 13.1 % (ref 10–15)
GLUCOSE UR STRIP-MCNC: NEGATIVE MG/DL
HCT VFR BLD AUTO: 41.1 % (ref 35–47)
HGB BLD-MCNC: 13.8 G/DL (ref 11.7–15.7)
HGB UR QL STRIP: ABNORMAL
KETONES UR STRIP-MCNC: NEGATIVE MG/DL
LEUKOCYTE ESTERASE UR QL STRIP: NEGATIVE
LYMPHOCYTES # BLD AUTO: 2.2 10E9/L (ref 0.8–5.3)
LYMPHOCYTES NFR BLD AUTO: 21.5 %
MCH RBC QN AUTO: 30.6 PG (ref 26.5–33)
MCHC RBC AUTO-ENTMCNC: 33.6 G/DL (ref 31.5–36.5)
MCV RBC AUTO: 91 FL (ref 78–100)
MONOCYTES # BLD AUTO: 0.7 10E9/L (ref 0–1.3)
MONOCYTES NFR BLD AUTO: 7 %
NEUTROPHILS # BLD AUTO: 7 10E9/L (ref 1.6–8.3)
NEUTROPHILS NFR BLD AUTO: 69.5 %
NITRATE UR QL: NEGATIVE
NON-SQ EPI CELLS #/AREA URNS LPF: ABNORMAL /LPF
PH UR STRIP: 7 PH (ref 5–7)
PLATELET # BLD AUTO: 501 10E9/L (ref 150–450)
RBC # BLD AUTO: 4.51 10E12/L (ref 3.8–5.2)
RBC #/AREA URNS AUTO: ABNORMAL /HPF
SOURCE: ABNORMAL
SP GR UR STRIP: <=1.005 (ref 1–1.03)
UROBILINOGEN UR STRIP-ACNC: 0.2 EU/DL (ref 0.2–1)
WBC # BLD AUTO: 10 10E9/L (ref 4–11)
WBC #/AREA URNS AUTO: ABNORMAL /HPF

## 2018-12-04 PROCEDURE — 85025 COMPLETE CBC W/AUTO DIFF WBC: CPT | Performed by: PHYSICIAN ASSISTANT

## 2018-12-04 PROCEDURE — 36415 COLL VENOUS BLD VENIPUNCTURE: CPT | Performed by: PHYSICIAN ASSISTANT

## 2018-12-04 PROCEDURE — 99214 OFFICE O/P EST MOD 30 MIN: CPT | Performed by: PHYSICIAN ASSISTANT

## 2018-12-04 PROCEDURE — 81001 URINALYSIS AUTO W/SCOPE: CPT | Performed by: PHYSICIAN ASSISTANT

## 2018-12-04 RX ORDER — SULFAMETHOXAZOLE/TRIMETHOPRIM 800-160 MG
1 TABLET ORAL 2 TIMES DAILY
Qty: 20 TABLET | Refills: 0 | Status: SHIPPED | OUTPATIENT
Start: 2018-12-04 | End: 2019-03-29

## 2018-12-04 RX ORDER — BENZONATATE 200 MG/1
200 CAPSULE ORAL 3 TIMES DAILY PRN
Qty: 21 CAPSULE | Refills: 0 | Status: SHIPPED | OUTPATIENT
Start: 2018-12-04 | End: 2019-03-29

## 2018-12-04 NOTE — PROGRESS NOTES
"  SUBJECTIVE:   Gabriella Jeffries is a 53 year old female who presents to clinic today for the following health issues:      HPI  Acute Illness   Acute illness concerns: Cough  Onset: over 3 weeks     Fever: no     Chills/Sweats: YES, bot     Headache (location?): YES    Sinus Pressure:YES    Conjunctivitis:  no    Ear Pain: no    Rhinorrhea: YES    Congestion: YES    Sore Throat: YES, from drainage      Cough: YES-productive of green sputum    Wheeze: YES    Decreased Appetite: no     Nausea: no     Vomiting: no     Diarrhea:  no     Dysuria/Freq.: no     Fatigue/Achiness: YES, both     Sick/Strep Exposure: YES, daughter had bronchitis      Therapies Tried and outcome: Robitussin  with codeine , Tylenol     Problem list and histories reviewed & adjusted, as indicated.  Additional history: Patient c/o firm stool difficult to evacuate and left lower quadrant abdominal pain during the last few days.      ROS:  Constitutional, HEENT, cardiovascular, pulmonary, gi and gu systems are negative, except as otherwise noted.    OBJECTIVE:     /80  Pulse 79  Temp 98.1  F (36.7  C) (Temporal)  Resp 16  Ht 5' 2.5\" (1.588 m)  Wt 150 lb (68 kg)  LMP  (LMP Unknown)  SpO2 98%  Breastfeeding? No  BMI 27 kg/m2  Body mass index is 27 kg/(m^2).  GEN: Well developed, well nourished in NAD.  NECK: Supple with No anterior cervical lymphadenopathy.  No Thyromegaly  RESP: + rale in right lower lobe with some crackles and decreased AF bilat bases.  SKIN: No Exanthem noted.  ABD: BSNA.  Tenderness to palpation left lower quadrant, suprapubic and epigastric regions. No rebound.       Diagnostic Test Results:  none     ASSESSMENT/PLAN:   1. Bronchopneumonia  - benzonatate (TESSALON) 200 MG capsule; Take 1 capsule (200 mg) by mouth 3 times daily as needed for cough  Dispense: 21 capsule; Refill: 0  - sulfamethoxazole-trimethoprim (BACTRIM DS/SEPTRA DS) 800-160 MG tablet; Take 1 tablet by mouth 2 times daily  Dispense: 20 tablet; " Refill: 0  - Urine Microscopic    2. Constipation, unspecified constipation type  -OTC Miralax restart.    3. LLQ abdominal pain  - *UA reflex to Microscopic and Culture ([facility] (except [place] and [place])  - CBC with platelets and differential    Use medication as directed.  Follow up if symptoms should persist, change or worsen.  Patient amenable to this follow up plan.     Kavon Figueroa PA-C  [facility]

## 2018-12-04 NOTE — MR AVS SNAPSHOT
"              After Visit Summary   12/4/2018    Gabriella Jeffries    MRN: [id number]           Patient Information     Date Of Birth          1965        Visit Information        Provider Department      12/4/2018 11:40 AM Kavon Figueroa PA-C [facility]        Today's Diagnoses     Bronchopneumonia    -  1    Constipation, unspecified constipation type        LLQ abdominal pain           Follow-ups after your visit        Who to contact     If you have questions or need follow up information about today's clinic visit or your schedule please contact [facility] directly at [phone].  Normal or non-critical lab and imaging results will be communicated to you by [employer], letter or phone within 4 business days after the clinic has received the results. If you do not hear from us within 7 days, please contact the clinic through [employer] or phone. If you have a critical or abnormal lab result, we will notify you by phone as soon as possible.  Submit refill requests through [employer] or call your pharmacy and they will forward the refill request to us. Please allow 3 business days for your refill to be completed.          Additional Information About Your Visit        MyChart Information     [employer] gives you secure access to your electronic health record. If you see a primary care provider, you can also send messages to your care team and make appointments. If you have questions, please call your primary care clinic.  If you do not have a primary care provider, please call [phone] and they will assist you.        Care EveryWhere ID     This is your Care EveryWhere ID. This could be used by other organizations to access your [place] medical records  [id number]        Your Vitals Were     Pulse Temperature Respirations Height Last Period Pulse Oximetry    79 98.1  F (36.7  C) (Temporal) 16 5' 2.5\" (1.588 m) (LMP Unknown) 98%    Breastfeeding? BMI (Body Mass Index)                " No 27 kg/m2           Blood Pressure from Last 3 Encounters:   12/04/18 104/80   11/20/18 152/90   09/19/17 124/74    Weight from Last 3 Encounters:   12/04/18 150 lb (68 kg)   11/20/18 150 lb (68 kg)   09/19/17 127 lb (57.6 kg)              We Performed the Following     *UA reflex to Microscopic and Culture ([facility] (except [place] and [place])     CBC with platelets and differential     Urine Microscopic          Today's Medication Changes          These changes are accurate as of 12/4/18 12:41 PM.  If you have any questions, ask your nurse or doctor.               Start taking these medicines.        Dose/Directions    benzonatate 200 MG capsule   Commonly known as:  TESSALON   Used for:  Bronchopneumonia   Started by:  Kavon Figueroa PA-C        Dose:  200 mg   Take 1 capsule (200 mg) by mouth 3 times daily as needed for cough   Quantity:  21 capsule   Refills:  0       sulfamethoxazole-trimethoprim 800-160 MG tablet   Commonly known as:  BACTRIM DS/SEPTRA DS   Used for:  Bronchopneumonia   Started by:  Kavon Figueroa PA-C        Dose:  1 tablet   Take 1 tablet by mouth 2 times daily   Quantity:  20 tablet   Refills:  0         Stop taking these medicines if you haven't already. Please contact your care team if you have questions.     azithromycin 500 MG tablet   Commonly known as:  ZITHROMAX   Stopped by:  Kavon Figueroa PA-C                Where to get your medicines      These medications were sent to [facility] - [place], [place] - [address] AT [facility] OF [facility] &  ( MAIN)  [address], [place] [address]     Phone:  [phone]     benzonatate 200 MG capsule    sulfamethoxazole-trimethoprim 800-160 MG tablet                Primary Care Provider Office Phone # Fax #    Erika Delgado [phone] [address] [address]        Equal Access to Services     [facility] LILIANE AH: Shimonii genaro wells  clay Jones, wamatiasda luqadaha, qaybta kaalmada joan, ange larsencathryn santhosh. So [facility] [phone].    ATENCIÓN: Si cesarla tai, tiene a pompa disposición servicios gratuitos de asistencia lingüística. Jacquelyn al [phone].    We comply with applicable federal civil rights laws and [place] laws. We do not discriminate on the basis of race, color, national origin, age, disability, sex, sexual orientation, or gender identity.            Thank you!     Thank you for choosing [facility]  for your care. Our goal is always to provide you with excellent care. Hearing back from our patients is one way we can continue to improve our services. Please take a few minutes to complete the written survey that you may receive in the mail after your visit with us. Thank you!             Your Updated Medication List - Protect others around you: Learn how to safely use, store and throw away your medicines at www.disposemymeds.org.          This list is accurate as of 12/4/18 12:41 PM.  Always use your most recent med list.                   Brand Name Dispense Instructions for use Diagnosis    benzonatate 200 MG capsule    TESSALON    21 capsule    Take 1 capsule (200 mg) by mouth 3 times daily as needed for cough    Bronchopneumonia       betamethasone valerate 0.1 % external ointment    VALISONE    60 g    Apply topically 2 times daily    Psoriasis       clobetasol 0.05 % external ointment    TEMOVATE    45 g    Apply  topically 2 times daily as needed. Apply sparingly to affected area.  Do not apply to face.    Psoriasis       guaiFENesin-codeine 100-10 MG/5ML solution    ROBITUSSIN AC    120 mL    Take 5 mLs by mouth every 4 hours as needed for cough    Viral upper respiratory infection       olopatadine 0.1 % ophthalmic solution    PATANOL    1 Bottle    INSTILL 1 DROP INTO BOTH EYES TWICE A DAY AS NEEDED    Chronic allergic conjunctivitis       oxymetazoline 0.05 % nasal spray    AFRIN  NASAL SPRAY    1 Bottle    Spray 2-3 sprays into both nostrils 2 times daily as needed for congestion    Viral upper respiratory infection       sulfamethoxazole-trimethoprim 800-160 MG tablet    BACTRIM DS/SEPTRA DS    20 tablet    Take 1 tablet by mouth 2 times daily    Bronchopneumonia

## 2019-01-04 ENCOUNTER — ANCILLARY PROCEDURE (OUTPATIENT)
Dept: MAMMOGRAPHY | Facility: CLINIC | Age: 54
End: 2019-01-04
Payer: COMMERCIAL

## 2019-01-04 DIAGNOSIS — Z12.31 VISIT FOR SCREENING MAMMOGRAM: ICD-10-CM

## 2019-01-04 PROCEDURE — 77067 SCR MAMMO BI INCL CAD: CPT | Performed by: RADIOLOGY

## 2019-01-04 PROCEDURE — 77063 BREAST TOMOSYNTHESIS BI: CPT | Performed by: RADIOLOGY

## 2019-03-07 ENCOUNTER — E-VISIT (OUTPATIENT)
Dept: FAMILY MEDICINE | Facility: CLINIC | Age: 54
End: 2019-03-07
Payer: COMMERCIAL

## 2019-03-07 DIAGNOSIS — J01.90 ACUTE SINUSITIS WITH SYMPTOMS > 10 DAYS: Primary | ICD-10-CM

## 2019-03-07 PROCEDURE — 99444 ZZC PHYSICIAN ONLINE EVALUATION & MANAGEMENT SERVICE: CPT | Performed by: FAMILY MEDICINE

## 2019-03-08 RX ORDER — AMOXICILLIN 875 MG
875 TABLET ORAL 2 TIMES DAILY
Qty: 20 TABLET | Refills: 0 | Status: SHIPPED | OUTPATIENT
Start: 2019-03-08 | End: 2019-03-29

## 2019-03-08 NOTE — PATIENT INSTRUCTIONS
Thank you for choosing us for your care. I have placed an order for a prescription so that you can start treatment. View your full visit summary for details by clicking on the link below. Your pharmacist will able to address any questions you may have about the medication.     If you're not feeling better within 5-7 days, please schedule an appointment.  You can schedule an appointment right here in [place], or call [phone]  If the visit is for the same symptoms as your e-visit, we'll refund the cost of your e-visit if seen within seven days.      Sinusitis (Antibiotic Treatment)    The sinuses are air-filled spaces within the bones of the face. They connect to the inside of the nose. Sinusitis is an inflammation of the tissue that lines the sinuses. Sinusitis can occur during a cold. It can also happen due to allergies to pollens and other particles in the air. Sinusitis can cause symptoms of sinus congestion and a feeling of fullness. A sinus infection causes fever, headache, and facial pain. There is often green or yellow fluid draining from the nose or into the back of the throat (post-nasal drip). You have been given antibiotics to treat this condition.  Home care    Take the full course of antibiotics as instructed. Do not stop taking them, even when you feel better.    Drink plenty of water, hot tea, and other liquids. This may help thin nasal mucus. It also may help your sinuses drain fluids.    Heat may help soothe painful areas of your face. Use a towel soaked in hot water. Or,  the shower and direct the warm spray onto your face. Using a vaporizer along with a menthol rub at night may also help soothe symptoms.     An expectorant with guaifenesin may help thin nasal mucus and help your sinuses drain fluids.    You can use an over-the-counter decongestant, unless a similar medicine was prescribed to you. Nasal sprays work the fastest. Use one that contains phenylephrine or oxymetazoline.  First blow your nose gently. Then use the spray. Do not use these medicines more often than directed on the label. If you do, your symptoms may get worse. You may also take pills that contain pseudoephedrine. Don t use products that combine multiple medicines. This is because side effects may be increased. Read labels. You can also ask the pharmacist for help. (People with high blood pressure should not use decongestants. They can raise blood pressure.)    Over-the-counter antihistamines may help if allergies contributed to your sinusitis.      Do not use nasal rinses or irrigation during an acute sinus infection, unless your healthcare provider tells you to. Rinsing may spread the infection to other areas in your sinuses.    Use acetaminophen or ibuprofen to control pain, unless another pain medicine was prescribed to you. If you have chronic liver or kidney disease or ever had a stomach ulcer, talk with your healthcare provider before using these medicines. (Aspirin should never be taken by anyone under age 18 who is ill with a fever. It may cause severe liver damage.)    Don't smoke. This can make symptoms worse.  Follow-up care  Follow up with your healthcare provider or our staff if you are better in 1 week.  When to seek medical advice  Call your healthcare provider if any of these occur:    Facial pain or headache that gets worse    Stiff neck    Unusual drowsiness or confusion    Swelling of your forehead or eyelids    Vision problems, such as blurred or double vision    Fever of 100.4 F (38 C) or higher, or as directed by your healthcare provider    Seizure    Breathing problems    Symptoms don't go away in 10 days  Prevention  Here are steps you can take to help prevent an infection:    Keep good hand washing habits.    Don t have close contact with people who have sore throats, colds, or other upper respiratory infections.    Don t smoke, and stay away from secondhand smoke.    Stay up to date with of  your vaccines.  Date Last Reviewed: 11/1/2017 2000-2018 [employer]. [address] [place] [address], [place] [address]. All rights reserved. This information is not intended as a substitute for professional medical care. Always follow your healthcare professional's instructions.

## 2019-03-15 ENCOUNTER — MYC MEDICAL ADVICE (OUTPATIENT)
Dept: FAMILY MEDICINE | Facility: CLINIC | Age: 54
End: 2019-03-15

## 2019-03-15 DIAGNOSIS — B37.31 YEAST VAGINITIS: Primary | ICD-10-CM

## 2019-03-15 RX ORDER — FLUCONAZOLE 150 MG/1
150 TABLET ORAL ONCE
Qty: 1 TABLET | Refills: 0 | Status: SHIPPED | OUTPATIENT
Start: 2019-03-15 | End: 2019-03-29

## 2019-03-26 NOTE — PROGRESS NOTES
SUBJECTIVE:   Gabriella Jeffries is a 53 year old female who presents to clinic today for the following health issues:    History of Present Illness     Diet:  Regular (no restrictions)  Frequency of exercise:  2-3 days/week  Duration of exercise:  15-30 minutes  Taking medications regularly:  Yes  Medication side effects:  Not applicable  Additional concerns today:  No    Joint Pain    Onset: 2 months    Description:   Location: Left arm  Character: Dull ache    Intensity: moderate, severe    Progression of Symptoms: worse    Accompanying Signs & Symptoms: Other symptoms: numbness and tingling, weakness in left arm    History: Previous similar pain: YES- tennis elbow of right arm     Precipitating factors: Trauma or overuse: YES- possibly from shoveling    Alleviating factors: Improved by:     Therapies Tried and outcome: ice and ibuprofen- not helping    Patient reports pain with lifting coffee cup, stretching it, reaching behind her head, and it keeps her up at night. Patient is right hand dominant. She doesn't remember hearing a rip or tear as she was shoveling this winter which she relates could have been the cause of her pain. She notes that her left elbow was swollen initially.     Problem list and histories reviewed & adjusted, as indicated.  Additional history: as documented    Gabriella's blood pressure is elevated in clinic to day and she states that her blood pressure usually runs low. Patient reports that she is stressed currently as she has been looking for work since September 2018 when she was laid off. She notes that she was in a higher level job and there aren't companies hiring for that position and so she is starting to consider looking for jobs below where she was. She is not sleeping well and relates that she feels depressed at times. She relates that she feels she could be improved with medication at times.    Denies chest pain or shortness of breath. She reports that she has been having more anxiety  attacks.       Patient Active Problem List   Diagnosis     Psoriasis     CARDIOVASCULAR SCREENING; LDL GOAL LESS THAN 160     Abdominal pain     Vaginal odor     Incontinence     IBS (irritable colon syndrome)     Abnormal LFTs     Disorder of bursae and tendons in shoulder region     Left shoulder pain     Esophageal reflux     H/O: hysterectomy     Left elbow pain     Elevated blood pressure reading without diagnosis of hypertension     Mild major depression (H)     DIANA (generalized anxiety disorder)     Past Surgical History:   Procedure Laterality Date     ABDOMEN SURGERY  2016    lysis of adhesions for bowel obstruction     C/SECTION, LOW TRANSVERSE      , Low Transverse     HYSTERECTOMY, VAGINAL  2006    has ovaries, due to bleeding     SHOULDER SURGERY Right 10/3/14    SAD/DCE     SURGICAL HISTORY OF -   2006    rectocele and TVT     SURGICAL HISTORY OF -       left lumpectomy       Social History     Tobacco Use     Smoking status: Never Smoker     Smokeless tobacco: Never Used   Substance Use Topics     Alcohol use: Yes     Comment: Social / 1 or 2 per week      Family History   Problem Relation Age of Onset     Heart Disease Mother         defibrillator     Cardiovascular Mother         defibrillator     Cardiovascular Father         valve replacement     Hypertension Father      Eye Disorder Father         maccular degeneration     Heart Disease Father         valve replacement     Lipids Father      Genitourinary Problems Brother         CKD     Cancer Brother         leukemia     Asthma No family hx of      C.A.D. No family hx of      Diabetes No family hx of      Cerebrovascular Disease No family hx of      Breast Cancer No family hx of      Cancer - colorectal No family hx of      Prostate Cancer No family hx of      Alzheimer Disease No family hx of      Arthritis No family hx of      Blood Disease No family hx of      Circulatory No family hx of      Gastrointestinal Disease No family  hx of      Musculoskeletal Disorder No family hx of      Neurologic Disorder No family hx of      Respiratory No family hx of      Thyroid Disease No family hx of      Alcohol/Drug No family hx of      Allergies No family hx of      Anesthesia Reaction No family hx of      Congenital Anomalies No family hx of      Connective Tissue Disorder No family hx of      Other Cancer No family hx of      Depression No family hx of      Anxiety Disorder No family hx of      Mental Illness No family hx of      Substance Abuse No family hx of      Osteoporosis No family hx of      Genetic Disorder No family hx of      Obesity No family hx of          Current Outpatient Medications   Medication Sig Dispense Refill     citalopram (CELEXA) 20 MG tablet Take 0.5 tablets (10 mg) by mouth daily for 7 days, THEN 1 tablet (20 mg) daily for 24 days. 30 tablet 0     clobetasol (TEMOVATE) 0.05 % ointment Apply  topically 2 times daily as needed. Apply sparingly to affected area.  Do not apply to face. 45 g 1     Allergies   Allergen Reactions     No Known Drug Allergy      Recent Labs   Lab Test 04/25/17  0850 03/02/16 02/16/16  1441 11/21/12  1541 05/04/12  1016 12/08/11  0941 09/21/11  0831   *  --  126*  --   --   --   --    HDL 50  --  46*  --   --   --   --    TRIG 72  --  156*  --   --   --   --    ALT  --   --   --   --  13 115* 16   CR  --  0.71  --   --   --  0.75 0.76   GFRESTIMATED  --   --   --   --   --  84 82   GFRESTBLACK  --   --   --   --   --  >90 >90   POTASSIUM  --  3.9  --   --   --  4.5 4.2   TSH 1.03  --   --  1.15 1.08  --  1.16      BP Readings from Last 3 Encounters:   03/29/19 142/86   12/04/18 104/80   11/20/18 152/90    Wt Readings from Last 3 Encounters:   03/29/19 70.9 kg (156 lb 6.4 oz)   12/04/18 68 kg (150 lb)   11/20/18 68 kg (150 lb)        ROS:  Constitutional, HEENT, cardiovascular, pulmonary, GI, , musculoskeletal, neuro, skin, endocrine and psych systems are negative, except as otherwise  "noted.    This document serves as a record of the services and decisions personally performed and made by Soha Lilly CNP. It was created on his/her behalf by Talya Chicas, trained medical scribe. The creation of this document is based the provider's statements to the medical scribes.    Tian Chicas 9:51 AM, March 29, 2019  OBJECTIVE:     /86   Pulse 80   Temp 99.7  F (37.6  C) (Temporal)   Resp 18   Ht 1.605 m (5' 3.19\")   Wt 70.9 kg (156 lb 6.4 oz)   LMP  (LMP Unknown)   SpO2 97%   BMI 27.54 kg/m    Body mass index is 27.54 kg/m .     GENERAL: healthy, alert and no distress  HENT: ear canals and TM's normal, nose and mouth without ulcers or lesions  NECK: no adenopathy, no asymmetry, masses, or scars and thyroid normal to palpation  RESP: lungs clear to auscultation - no rales, rhonchi or wheezes  CV: regular rate and rhythm, normal S1 S2, no S3 or S4, no murmur, click or rub, no peripheral edema and peripheral pulses strong  MS: Tender at the left lateral epicondyle, decreased strength with flexion and extension of left forearm, tender at left distal tricep muscle, significantly tender at left distal bicep. There seems to be a lack of partial tendon insertion in the left bicep when stressed, compared to right.  SKIN: no suspicious lesions or rashes  NEURO: Normal strength and tone, mentation intact and speech normal  PSYCH: mentation appears normal, Anxious affect slightly tearful     Diagnostic Test Results:  No results found for this or any previous visit (from the past 24 hour(s)).    ASSESSMENT/PLAN:       ICD-10-CM    1. Left elbow pain M25.522 MR Elbow Left w/o Contrast   2. IDANA (generalized anxiety disorder) F41.1 citalopram (CELEXA) 20 MG tablet   3. Mild major depression (H) F32.0 citalopram (CELEXA) 20 MG tablet   4. Elevated blood pressure reading without diagnosis of hypertension R03.0      Reviewed symptoms and etiology patient presents with today. Advised the patient have " an MRI without contrast of her left elbow; Referral provided for patient to schedule. Tennis elbow brace provided for patient to wear and advised that she not do any lifting with her left arm pending imaging. Discussed that she may need an orthopedics referral pending imaging.    Blood pressure elevated initially in clinic and upon recheck. I appreciate that her mood is contributing to this as it is a significant issue for the patient. Plan to treat mood and will recheck blood pressure in 3 weeks. Discussed mood at length with patient today. Advised starting Celexa and tapering up from 10 mg to 20 mg; Rx provided. Reviewed directions, benefits, and side effects of medication with patient today.     Follow up with PCP in 1 month for mood medication management and blood pressure recheck or prn.     The information in this document, created by the medical scribe for me, accurately reflects the services I personally performed and the decisions made by me. I have reviewed and approved this document for accuracy prior to leaving the patient care area.  Soha Lilly CNP  9:51 AM, 03/29/19    NUNU Brito CNP  [facility]

## 2019-03-29 ENCOUNTER — OFFICE VISIT (OUTPATIENT)
Dept: FAMILY MEDICINE | Facility: CLINIC | Age: 54
End: 2019-03-29
Payer: COMMERCIAL

## 2019-03-29 VITALS
HEART RATE: 80 BPM | DIASTOLIC BLOOD PRESSURE: 86 MMHG | TEMPERATURE: 99.7 F | SYSTOLIC BLOOD PRESSURE: 142 MMHG | RESPIRATION RATE: 18 BRPM | HEIGHT: 63 IN | WEIGHT: 156.4 LBS | OXYGEN SATURATION: 97 % | BODY MASS INDEX: 27.71 KG/M2

## 2019-03-29 DIAGNOSIS — M25.522 LEFT ELBOW PAIN: Primary | ICD-10-CM

## 2019-03-29 DIAGNOSIS — R03.0 ELEVATED BLOOD PRESSURE READING WITHOUT DIAGNOSIS OF HYPERTENSION: ICD-10-CM

## 2019-03-29 DIAGNOSIS — F32.0 MILD MAJOR DEPRESSION (H): ICD-10-CM

## 2019-03-29 DIAGNOSIS — F41.1 GAD (GENERALIZED ANXIETY DISORDER): ICD-10-CM

## 2019-03-29 PROCEDURE — 99214 OFFICE O/P EST MOD 30 MIN: CPT | Performed by: NURSE PRACTITIONER

## 2019-03-29 RX ORDER — CITALOPRAM HYDROBROMIDE 20 MG/1
TABLET ORAL
Qty: 30 TABLET | Refills: 0 | Status: SHIPPED | OUTPATIENT
Start: 2019-03-29 | End: 2019-05-02

## 2019-03-29 ASSESSMENT — ANXIETY QUESTIONNAIRES
7. FEELING AFRAID AS IF SOMETHING AWFUL MIGHT HAPPEN: SEVERAL DAYS
1. FEELING NERVOUS, ANXIOUS, OR ON EDGE: MORE THAN HALF THE DAYS
3. WORRYING TOO MUCH ABOUT DIFFERENT THINGS: MORE THAN HALF THE DAYS
IF YOU CHECKED OFF ANY PROBLEMS ON THIS QUESTIONNAIRE, HOW DIFFICULT HAVE THESE PROBLEMS MADE IT FOR YOU TO DO YOUR WORK, TAKE CARE OF THINGS AT HOME, OR GET ALONG WITH OTHER PEOPLE: SOMEWHAT DIFFICULT
5. BEING SO RESTLESS THAT IT IS HARD TO SIT STILL: SEVERAL DAYS
6. BECOMING EASILY ANNOYED OR IRRITABLE: MORE THAN HALF THE DAYS
GAD7 TOTAL SCORE: 11
2. NOT BEING ABLE TO STOP OR CONTROL WORRYING: MORE THAN HALF THE DAYS

## 2019-03-29 ASSESSMENT — PAIN SCALES - GENERAL: PAINLEVEL: MILD PAIN (2)

## 2019-03-29 ASSESSMENT — MIFFLIN-ST. JEOR: SCORE: 1291.56

## 2019-03-29 ASSESSMENT — PATIENT HEALTH QUESTIONNAIRE - PHQ9
SUM OF ALL RESPONSES TO PHQ QUESTIONS 1-9: 13
5. POOR APPETITE OR OVEREATING: SEVERAL DAYS

## 2019-03-30 ASSESSMENT — ANXIETY QUESTIONNAIRES: GAD7 TOTAL SCORE: 11

## 2019-04-01 ENCOUNTER — ANCILLARY PROCEDURE (OUTPATIENT)
Dept: MRI IMAGING | Facility: CLINIC | Age: 54
End: 2019-04-01
Attending: NURSE PRACTITIONER
Payer: COMMERCIAL

## 2019-04-01 DIAGNOSIS — M25.522 LEFT ELBOW PAIN: ICD-10-CM

## 2019-04-01 PROCEDURE — 73221 MRI JOINT UPR EXTREM W/O DYE: CPT | Mod: LT | Performed by: RADIOLOGY

## 2019-04-17 ENCOUNTER — MYC MEDICAL ADVICE (OUTPATIENT)
Dept: FAMILY MEDICINE | Facility: CLINIC | Age: 54
End: 2019-04-17

## 2019-04-17 DIAGNOSIS — M77.12 LATERAL EPICONDYLITIS OF LEFT ELBOW: Primary | ICD-10-CM

## 2019-04-24 ENCOUNTER — THERAPY VISIT (OUTPATIENT)
Dept: OCCUPATIONAL THERAPY | Facility: CLINIC | Age: 54
End: 2019-04-24
Payer: COMMERCIAL

## 2019-04-24 DIAGNOSIS — M77.12 LATERAL EPICONDYLITIS OF LEFT ELBOW: ICD-10-CM

## 2019-04-24 DIAGNOSIS — M25.522 LEFT ELBOW PAIN: Primary | ICD-10-CM

## 2019-04-24 PROCEDURE — 97165 OT EVAL LOW COMPLEX 30 MIN: CPT | Mod: GO | Performed by: OCCUPATIONAL THERAPIST

## 2019-04-24 PROCEDURE — 97760 ORTHOTIC MGMT&TRAING 1ST ENC: CPT | Mod: GO | Performed by: OCCUPATIONAL THERAPIST

## 2019-04-24 PROCEDURE — 97110 THERAPEUTIC EXERCISES: CPT | Mod: GO | Performed by: OCCUPATIONAL THERAPIST

## 2019-04-24 NOTE — PROGRESS NOTES
Hand Therapy Initial Evaluation    Current Date:  4/24/2019  Referring Physician:    Diagnosis: Left Lateral Epicondyltis  DOI: 2/2019    Subjective:  Gabriella Jeffries is a 53 year old right hand dominant female.    Patient reports symptoms of pain, stiffness/loss of motion, weakness/loss of strength, edema, numbness and tingling  of the left elbow and wrist which occurred due to shoveling snow. Since onset symptoms are Unchanged  Special tests:  MRI which indicated a partial tear of the extensor tendon.  Previous treatment: aircast.    General health as reported by patient is good.  Pertinent medical history includes:High Blood Pressure, Menopausal  Medical allergies:none.  Surgical history: orthopedic: (R) shoulder.  Medication history: Anti-depressants.    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 but returning to work in 2 weeks  Job Tasks: Computer Work, Prolonged Sitting  Prior functional level:  no limitations  Barriers include:none  Mobility: No difficulty  Transportation: drives  Leisure activities/hobbies: skiing, walking, hiking    Upper Extremity Functional Index Score:  SCORE:   Column Totals: /80: 36   (A lower score indicates greater disability.)    Objective:  Pain Level (Scale 0-10):   4/24/2019   At Rest 2/10   With Use 6/10     Pain Description:   Date 4/24/2019   Location Lateral elbow, lateral upper arm, wrist, hand, ring finger and small finger   Pain Quality Aching, Burning and Sharp   Frequency constant     Pain is worst  same, difficulty sleeping   Exacerbated by  activity, lifting, gripping twisting   Relieved by cold and rest   Progression Gradually worsening      ROM:  Elbow 4/24/19 4/24/19   AROM Right Left   Extension +5 -22++   Flexion 145 140++   Supination 75 80   Pronation 90 85       Wrist 4/24/19 4/24/19   PROM Right Left   Extension 70 75   Flexion 75 55     Resisted Testing   +,++,+++ = pain  Left 4/24/19   Elbow Flexion-sup  +++   Elbow Flexion-neutral ++   Elbow Flexion-pron ++   Elbow Extension -   Pronation +++   Supination +++   Wrist Ext with RD +++   Wrist Ext with UD +++   MCPJ Extension IF, MF +++   MCPJ Extension MF +++   MCPJ Extension 2-5 +++      19   Trials Right Left   1  2  3  Av  32  37  35 10       Elbow ext 19   Trials Right Left   1  2  3  Av 5     Palpation:  Left 19   Lateral Epicondyle +++   PIN +++   Radial Head +++   Cubital tunnel +++   MEP ++     Sensation:  Patient reports recent tingling on the medial side of the forearm radiating down to the RF & SF    Assessment:  Patient presents with symptoms consistent with diagnosis of Lateral Epicondylitis,  with conservative intervention.     Patient's limitations or Problem List includes:  Pain, Decreased ROM/motion, Weakness, Decreased  and Tightness in musculature of the left elbow, wrist, ring finger, small finger and upper arm which interferes with the patient's ability to perform Self Care Tasks (dressing, bathing), Sleep Patterns, Recreational Activities and Household Chores as compared to previous level of function.    Rehab Potential:  Excellent - Return to full activity, no limitations    Patient will benefit from skilled Occupational Therapy to increase ROM, flexibility,  strength and forearm strength and decrease pain to return to previous activity level and resume normal daily tasks and to reach their rehab potential.    Barriers to Learning:  No barrier    Communication Issues:  Patient appears to be able to clearly communicate and understand verbal and written communication and follow directions correctly.    Chart Review: Brief history including review of medical and/or therapy records relating to the presenting problem and Simple history review with patient    Identified Performance Deficits: bathing/showering, dressing, home establishment and management, meal preparation and cleanup, sleep and leisure  activities    Assessment of Occupational Performance:  5 or more Performance Deficits    Clinical Decision Making (Complexity): Low complexity    Treatment Explanation:  The following has been discussed with the patient:    RX ordered/plan of care  Anticipated outcomes  Possible risks and side effects    Plan:  Frequency:  1 X week, once daily  Duration:  for 8 weeks    Treatment Plan:    Modalities:  US and E-Stim   Therapeutic Exercise: AROM of elbow and wrist, PROM with stretch to wrist extensors and flexors,  ECRL strengthening progressing to ECRB strengthening (eccentric progressing to concentric ).   Manual Techniques: Radial head mobilization, transverse friction massage, myofascial release   Neuromuscular:  Nerve gliding, K taping  Orthotic Fabrication:  Wrist cock up orthosis, arm band     Discharge Plan:    Achieve all LTG.  Independent in home treatment program.  Reach maximal therapeutic benefit.    Home Program:   Warmth for stiffness  Ice after activity for pain  PROM with stretch to wrist extensors and flexors  TFM to LEP  MFR to extensors  Wrist cock up orthosis for sleeping  Elbow strap/arm band as needed with activities  Avoid activities that exacerbate pain in the elbow  Lift with forearms in neutral position    Next Visit:  US  MFR/TFM  ECRL strengthening  Trial of K-tape  Nerve glides

## 2019-04-24 NOTE — LETTER
[facility]  [address]  [facility] [address]  [phone]    2019    Re: Gabriella Jeffries   :   1965  MRN:  [id number]   REFERRING PHYSICIAN:   Soha Lilly    [facility]    Date of Initial Evaluation:  ***  Visits:  Rxs Used: 1  Reason for Referral:     Lateral epicondylitis of left elbow  Left elbow pain    EVALUATION SUMMARY    Hand Therapy Initial Evaluation    Current Date:  2019  Referring Physician:    Diagnosis: Left Lateral Epicondyltis  DOI: 2019    Subjective:  Gabriella Jeffries is a 53 year old right hand dominant female.    Patient reports symptoms of pain, stiffness/loss of motion, weakness/loss of strength, edema, numbness and tingling  of the left elbow and wrist which occurred due to shoveling snow. Since onset symptoms are Unchanged  Special tests:  MRI which indicated a partial tear of the extensor tendon.  Previous treatment: aircast.    General health as reported by patient is good.  Pertinent medical history includes:High Blood Pressure, Menopausal  Medical allergies:none.  Surgical history: orthopedic: (R) shoulder.  Medication history: Anti-depressants.    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 but returning to work in 2 weeks  Job Tasks: Computer Work, Prolonged Sitting  Prior functional level:  no limitations  Barriers include:none  Mobility: No difficulty  Transportation: drives  Leisure activities/hobbies: skiing, walking, hiking    Upper Extremity Functional Index Score:  SCORE:   Column Totals: /80: 36   (A lower score indicates greater disability.)    Objective:  Pain Level (Scale 0-10):   2019   At Rest 10   With Use 6/10     Pain Description:   Date 2019   Location Lateral elbow, lateral upper arm, wrist, hand, ring finger and small finger   Pain Quality Aching, Burning and Sharp   Frequency constant     Pain is worst  same, difficulty sleeping    Exacerbated by  activity, lifting, gripping twisting   Relieved by cold and rest   Progression Gradually worsening      ROM:  Elbow 19   AROM Right Left   Extension +5 -22++   Flexion 145 140++   Supination 75 80   Pronation 90 85       Wrist 19   PROM Right Left   Extension 70 75   Flexion 75 55     Resisted Testing   +,++,+++ = pain  Left 19   Elbow Flexion-sup +++   Elbow Flexion-neutral ++   Elbow Flexion-pron ++   Elbow Extension -   Pronation +++   Supination +++   Wrist Ext with RD +++   Wrist Ext with UD +++   MCPJ Extension IF, MF +++   MCPJ Extension MF +++   MCPJ Extension 2-5 +++      19   Trials Right Left   1  2  3  Av  32  37  35 10       Elbow ext 19   Trials Right Left   1  2  3  Av 5     Palpation:  Left 19   Lateral Epicondyle +++   PIN +++   Radial Head +++   Cubital tunnel +++   MEP ++     Sensation:  Patient reports recent tingling on the medial side of the forearm radiating down to the RF & SF    Assessment:  Patient presents with symptoms consistent with diagnosis of Lateral Epicondylitis,  with conservative intervention.     Patient's limitations or Problem List includes:  Pain, Decreased ROM/motion, Weakness, Decreased  and Tightness in musculature of the left elbow, wrist, ring finger, small finger and upper arm which interferes with the patient's ability to perform Self Care Tasks (dressing, bathing), Sleep Patterns, Recreational Activities and Household Chores as compared to previous level of function.    Rehab Potential:  Excellent - Return to full activity, no limitations    Patient will benefit from skilled Occupational Therapy to increase ROM, flexibility,  strength and forearm strength and decrease pain to return to previous activity level and resume normal daily tasks and to reach their rehab potential.    Barriers to Learning:  No barrier    Communication Issues:  Patient appears to be able to clearly  communicate and understand verbal and written communication and follow directions correctly.    Chart Review: Brief history including review of medical and/or therapy records relating to the presenting problem and Simple history review with patient    Identified Performance Deficits: bathing/showering, dressing, home establishment and management, meal preparation and cleanup, sleep and leisure activities    Assessment of Occupational Performance:  5 or more Performance Deficits    Clinical Decision Making (Complexity): Low complexity    Treatment Explanation:  The following has been discussed with the patient:    RX ordered/plan of care  Anticipated outcomes  Possible risks and side effects    Plan:  Frequency:  1 X week, once daily  Duration:  for 8 weeks    Treatment Plan:    Modalities:  US and E-Stim   Therapeutic Exercise: AROM of elbow and wrist, PROM with stretch to wrist extensors and flexors,  ECRL strengthening progressing to ECRB strengthening (eccentric progressing to concentric ).   Manual Techniques: Radial head mobilization, transverse friction massage, myofascial release   Neuromuscular:  Nerve gliding, K taping  Orthotic Fabrication:  Wrist cock up orthosis, arm band     Discharge Plan:    Achieve all LTG.  Independent in home treatment program.  Reach maximal therapeutic benefit.    Home Program:   Warmth for stiffness  Ice after activity for pain  PROM with stretch to wrist extensors and flexors  TFM to LEP  MFR to extensors  Wrist cock up orthosis for sleeping  Elbow strap/arm band as needed with activities  Avoid activities that exacerbate pain in the elbow  Lift with forearms in neutral position    Next Visit:  US  MFR/TFM  ECRL strengthening  Trial of K-tape  Nerve glides    Thank you for your referral.    INQUIRIES  Therapist:    [facility]  [address] [facility] [address]  [facility] [address]  Phone: [phone]  Fax: [phone]

## 2019-04-29 DIAGNOSIS — F41.1 GAD (GENERALIZED ANXIETY DISORDER): ICD-10-CM

## 2019-04-29 DIAGNOSIS — F32.0 MILD MAJOR DEPRESSION (H): ICD-10-CM

## 2019-04-30 NOTE — TELEPHONE ENCOUNTER
Celexa    PHQ-9 SCORE 3/29/2019   PHQ-9 Total Score 13       Routing refill request to provider for review/approval because:  Labs out of range:  PHQ9    Isidra Koroma, RN, BSN

## 2019-05-01 ENCOUNTER — THERAPY VISIT (OUTPATIENT)
Dept: OCCUPATIONAL THERAPY | Facility: CLINIC | Age: 54
End: 2019-05-01
Payer: COMMERCIAL

## 2019-05-01 DIAGNOSIS — M25.522 LEFT ELBOW PAIN: Primary | ICD-10-CM

## 2019-05-01 DIAGNOSIS — M77.10 LATERAL EPICONDYLITIS, UNSPECIFIED LATERALITY: ICD-10-CM

## 2019-05-01 PROCEDURE — 97140 MANUAL THERAPY 1/> REGIONS: CPT | Mod: GO | Performed by: OCCUPATIONAL THERAPIST

## 2019-05-01 PROCEDURE — 97110 THERAPEUTIC EXERCISES: CPT | Mod: GO | Performed by: OCCUPATIONAL THERAPIST

## 2019-05-01 PROCEDURE — 97035 APP MDLTY 1+ULTRASOUND EA 15: CPT | Mod: GO | Performed by: OCCUPATIONAL THERAPIST

## 2019-05-01 NOTE — PROGRESS NOTES
SOAP Note - Hand Therapy - Objective Information    Current Date:  2019  Referring Physician: Soha EDDY CNP    Diagnosis: Left Lateral Epicondyltis  DOI: 2019    Gabriella Jeffries is a 53 year old right hand dominant female.    Patient reports symptoms of pain, stiffness/loss of motion, weakness/loss of strength, edema, numbness and tingling  of the left elbow and wrist which occurred due to shoveling snow.     S:  Subjective changes as noted by patient: The elbow may be a bit better but the upper arm is still painful.  I can't sleep at night due to the pain.  Functional changes noted by patient: no changes      O:  Pain Level (Scale 0-10):   2019   At Rest 2/10 3/10   With Use 6/10 6/10     Pain Description:   Date 2019   Location Lateral elbow, lateral upper arm, wrist, hand, ring finger and small finger   Pain Quality Aching, Burning and Sharp   Frequency constant     Pain is worst  same, difficulty sleeping   Exacerbated by  activity, lifting, gripping twisting   Relieved by cold and rest   Progression Gradually worsening      ROM:  Elbow 19   AROM Right Left   Extension +5 -22++   Flexion 145 140++   Supination 75 80   Pronation 90 85       Wrist 19   PROM Right Left   Extension 70 75   Flexion 75 55     Shoulder 19   AROM(PROM) Right Left   Flexion 150 120   Internal Rotation Lateral buttock Lateral buttock   External Rotation 80 80   Abduction         Resisted Testing   +,++,+++ = pain  Left 19   Elbow Flexion-sup +++   Elbow Flexion-neutral ++   Elbow Flexion-pron ++   Elbow Extension -   Pronation +++   Supination +++   Wrist Ext with RD +++   Wrist Ext with UD +++   MCPJ Extension IF, MF +++   MCPJ Extension MF +++   MCPJ Extension 2-5 +++      19   Trials Right Left   1  2  3  Av  32  37  35 10       Elbow ext 19   Trials Right Left   1  2  3  Av 5     Palpation:  Left 19   Lateral Epicondyle +++     PIN +++    Radial Head +++    Cubital tunnel +++    MEP ++    Proximal bicep  +++     Sensation:  Patient reports recent tingling on the medial side of the forearm radiating down to the RF & SF    Cervical screen: ROM of neck near WNL however clicking present with flex/ext and rotation.  Patient reports pain down her arm with ext and side bending to left side.    Please refer to the daily flowsheet for treatment provided today.     Home Program:   Warmth for stiffness  Ice after activity for pain  PROM with stretch to wrist extensors and flexors  TFM to LEP  MFR to extensors  Wrist cock up orthosis for sleeping  Elbow strap/arm band as needed with activities  Avoid activities that exacerbate pain in the elbow  Lift with forearms in neutral position    Next Visit:  US  MFR/TFM  ECRL strengthening  Trial of K-tape  Nerve glides

## 2019-05-01 NOTE — PROGRESS NOTES
Answers for HPI/ROS submitted by the patient on 5/2/2019   PHQ9 TOTAL SCORE: 9  DIANA 7 TOTAL SCORE: 6    SUBJECTIVE:   Gabriella Jeffries is a 53 year old female who presents to clinic today for the following health issues:      HPI  Depression and Anxiety Follow-Up    Status since last visit: Improved     Other associated symptoms:None  Complicating factors: Answers for HPI/ROS submitted by the patient on 5/2/2019   PHQ9 TOTAL SCORE: 9  DIANA 7 TOTAL SCORE: 6    Significant life event: Yes-      Current substance abuse: None    PHQ 3/29/2019 5/2/2019   PHQ-9 Total Score 13 9   Q9: Thoughts of better off dead/self-harm past 2 weeks Not at all Not at all     DIANA-7 SCORE 3/29/2019 5/2/2019   Total Score - 6 (mild anxiety)   Total Score 11 6       PHQ-9  English  PHQ-9   Any Language  DIANA-7  Suicide Assessment Five-step Evaluation and Treatment (SAFE-T)  Additional HPI:  Patient states she was started on Celexa in March, and it has really been helping.  She reports when she initially started the medication, it made her sleepy; however, that has worn off.  No adverse side effects.  Has been sleeping well.  Improvement noted in PHQ-9 and DIANA scores since onset of medication from 3/29/19 to today.  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    Answers for HPI/ROS submitted by the patient on 5/2/2019   PHQ9 TOTAL SCORE: 9  DIANA 7 TOTAL SCORE: 6      Reviewed and updated as needed this visit by clinical staff  Tobacco  Allergies  Meds  Problems  Med Hx  Surg Hx  Fam Hx  Soc Hx        Reviewed and updated as needed this visit by Provider  Tobacco  Allergies  Meds  Problems  Med Hx  Surg Hx  Fam Hx             Patient Active Problem List   Diagnosis     Psoriasis     CARDIOVASCULAR SCREENING; LDL GOAL LESS THAN 160     Abdominal pain     Vaginal odor     Incontinence     IBS (irritable colon syndrome)     Abnormal LFTs     Disorder of bursae and tendons in shoulder region     Left shoulder pain     Esophageal  reflux     H/O: hysterectomy     Left elbow pain     Lateral epicondylitis, unspecified laterality     Elevated blood pressure reading without diagnosis of hypertension     Mild major depression (H)     DIANA (generalized anxiety disorder)     Past Surgical History:   Procedure Laterality Date     ABDOMEN SURGERY  2016    lysis of adhesions for bowel obstruction     C/SECTION, LOW TRANSVERSE      , Low Transverse     HYSTERECTOMY, VAGINAL      has ovaries, due to bleeding     SHOULDER SURGERY Right 10/3/14    SAD/DCE     SURGICAL HISTORY OF -   2006    rectocele and TVT     SURGICAL HISTORY OF -       left lumpectomy       Social History     Tobacco Use     Smoking status: Never Smoker     Smokeless tobacco: Never Used   Substance Use Topics     Alcohol use: Yes     Comment: Social / 1 or 2 per week      Family History   Problem Relation Age of Onset     Heart Disease Mother         defibrillator     Cardiovascular Mother         defibrillator     Cardiovascular Father         valve replacement     Hypertension Father      Eye Disorder Father         maccular degeneration     Heart Disease Father         valve replacement     Lipids Father      Genitourinary Problems Brother         CKD     Cancer Brother         leukemia     Asthma No family hx of      C.A.D. No family hx of      Diabetes No family hx of      Cerebrovascular Disease No family hx of      Breast Cancer No family hx of      Cancer - colorectal No family hx of      Prostate Cancer No family hx of      Alzheimer Disease No family hx of      Arthritis No family hx of      Blood Disease No family hx of      Circulatory No family hx of      Gastrointestinal Disease No family hx of      Musculoskeletal Disorder No family hx of      Neurologic Disorder No family hx of      Respiratory No family hx of      Thyroid Disease No family hx of      Alcohol/Drug No family hx of      Allergies No family hx of      Anesthesia Reaction No family hx of   "    Congenital Anomalies No family hx of      Connective Tissue Disorder No family hx of      Other Cancer No family hx of      Depression No family hx of      Anxiety Disorder No family hx of      Mental Illness No family hx of      Substance Abuse No family hx of      Osteoporosis No family hx of      Genetic Disorder No family hx of      Obesity No family hx of          Current Outpatient Medications   Medication Sig Dispense Refill     citalopram (CELEXA) 20 MG tablet Take 1 tablet (20 mg) by mouth daily 30 tablet 2     clobetasol (TEMOVATE) 0.05 % ointment Apply  topically 2 times daily as needed. Apply sparingly to affected area.  Do not apply to face. 45 g 1     Allergies   Allergen Reactions     No Known Drug Allergy      BP Readings from Last 3 Encounters:   05/02/19 138/84   03/29/19 142/86   12/04/18 104/80    Wt Readings from Last 3 Encounters:   05/02/19 70.6 kg (155 lb 11.2 oz)   03/29/19 70.9 kg (156 lb 6.4 oz)   12/04/18 68 kg (150 lb)                    ROS:  Constitutional, HEENT, cardiovascular, pulmonary, gi and gu systems are negative, except as otherwise noted.    OBJECTIVE:     /84   Pulse 93   Temp 98.2  F (36.8  C) (Temporal)   Resp 18   Ht 1.6 m (5' 3\")   Wt 70.6 kg (155 lb 11.2 oz)   LMP  (LMP Unknown)   SpO2 96%   Breastfeeding? No   BMI 27.58 kg/m    Body mass index is 27.58 kg/m .  GENERAL: healthy, alert and no distress  NECK: no adenopathy, no asymmetry, masses, or scars and thyroid normal to palpation  RESP: lungs clear to auscultation - no rales, rhonchi or wheezes  CV: regular rate and rhythm, normal S1 S2, no S3 or S4, no murmur, click or rub, no peripheral edema and peripheral pulses strong  ABDOMEN: soft, nontender, no hepatosplenomegaly, no masses and bowel sounds normal  MS: no gross musculoskeletal defects noted, no edema  NEURO: Normal strength and tone, mentation intact and speech normal  PSYCH: mentation appears normal, affect normal/bright and " smiling/making good eye contact    Diagnostic Test Results:  none     ASSESSMENT/PLAN:   1. DIANA (generalized anxiety disorder)  Patient doing well on current medication and dose.  No reported side effects.  Refilled today.  - citalopram (CELEXA) 20 MG tablet; Take 1 tablet (20 mg) by mouth daily  Dispense: 30 tablet; Refill: 2    2. Mild major depression (H)  Patient doing well on current medication and dose.  No reported side effects.  Refilled today.  - citalopram (CELEXA) 20 MG tablet; Take 1 tablet (20 mg) by mouth daily  Dispense: 30 tablet; Refill: 2    Patient Instructions   Take medication as prescribed.    Please call or return to the clinic if symptoms are becoming worse or for any other concerns.    Tanya Cedeno, KAYLIE Cedeno, CNP  [facility]

## 2019-05-02 ENCOUNTER — TELEPHONE (OUTPATIENT)
Dept: FAMILY MEDICINE | Facility: CLINIC | Age: 54
End: 2019-05-02

## 2019-05-02 ENCOUNTER — OFFICE VISIT (OUTPATIENT)
Dept: FAMILY MEDICINE | Facility: CLINIC | Age: 54
End: 2019-05-02
Payer: COMMERCIAL

## 2019-05-02 VITALS
OXYGEN SATURATION: 96 % | HEART RATE: 93 BPM | RESPIRATION RATE: 18 BRPM | SYSTOLIC BLOOD PRESSURE: 138 MMHG | BODY MASS INDEX: 27.59 KG/M2 | WEIGHT: 155.7 LBS | DIASTOLIC BLOOD PRESSURE: 84 MMHG | HEIGHT: 63 IN | TEMPERATURE: 98.2 F

## 2019-05-02 DIAGNOSIS — M54.2 NECK PAIN: Primary | ICD-10-CM

## 2019-05-02 DIAGNOSIS — F41.1 GAD (GENERALIZED ANXIETY DISORDER): ICD-10-CM

## 2019-05-02 DIAGNOSIS — F32.0 MILD MAJOR DEPRESSION (H): ICD-10-CM

## 2019-05-02 PROCEDURE — 99214 OFFICE O/P EST MOD 30 MIN: CPT | Performed by: NURSE PRACTITIONER

## 2019-05-02 RX ORDER — CITALOPRAM HYDROBROMIDE 20 MG/1
20 TABLET ORAL DAILY
Qty: 30 TABLET | Refills: 2 | Status: SHIPPED | OUTPATIENT
Start: 2019-05-02 | End: 2019-09-20

## 2019-05-02 RX ORDER — CITALOPRAM HYDROBROMIDE 20 MG/1
20 TABLET ORAL DAILY
Qty: 30 TABLET | Refills: 0 | Status: SHIPPED | OUTPATIENT
Start: 2019-05-02 | End: 2019-05-02

## 2019-05-02 ASSESSMENT — ANXIETY QUESTIONNAIRES
GAD7 TOTAL SCORE: 6
2. NOT BEING ABLE TO STOP OR CONTROL WORRYING: SEVERAL DAYS
GAD7 TOTAL SCORE: 6
6. BECOMING EASILY ANNOYED OR IRRITABLE: SEVERAL DAYS
7. FEELING AFRAID AS IF SOMETHING AWFUL MIGHT HAPPEN: NOT AT ALL
5. BEING SO RESTLESS THAT IT IS HARD TO SIT STILL: NOT AT ALL
7. FEELING AFRAID AS IF SOMETHING AWFUL MIGHT HAPPEN: NOT AT ALL
3. WORRYING TOO MUCH ABOUT DIFFERENT THINGS: MORE THAN HALF THE DAYS
1. FEELING NERVOUS, ANXIOUS, OR ON EDGE: SEVERAL DAYS
GAD7 TOTAL SCORE: 6
4. TROUBLE RELAXING: SEVERAL DAYS

## 2019-05-02 ASSESSMENT — PATIENT HEALTH QUESTIONNAIRE - PHQ9
SUM OF ALL RESPONSES TO PHQ QUESTIONS 1-9: 9
SUM OF ALL RESPONSES TO PHQ QUESTIONS 1-9: 9

## 2019-05-02 ASSESSMENT — MIFFLIN-ST. JEOR: SCORE: 1285.38

## 2019-05-02 NOTE — PATIENT INSTRUCTIONS
Take medication as prescribed.    Please call or return to the clinic if symptoms are becoming worse or for any other concerns.    Tanya Cedeno, CNP

## 2019-05-02 NOTE — TELEPHONE ENCOUNTER
- Continue Coumadin and titrated dose as necessary.   - She is on primidone at home. 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        ----- Message from Diane Haq OT sent at 5/1/2019 11:36 AM CDT -----  Regarding: PT referral for neck/shoulder  Almas Hoyos,    Thank you for this referral to hand therapy for the above patient.  Patient reports a little bit of progress with the elbow however continues to complain of upper arm pain and difficulty sleeping.  I was hoping the night splint would help with this.  I did a shoulder and cervical screen and found some deficits that might be contributing to her elbow pain.  I feel a referral to PT for neck and shoulder would be beneficial.  I would like to continue with her elbow therapy after their consult since we know from the MRI there is an extensor tear.  If you agree, please place an order for physical therapy.    Thanks   Diane Haq OTR/L,CHT

## 2019-05-03 ASSESSMENT — PATIENT HEALTH QUESTIONNAIRE - PHQ9: SUM OF ALL RESPONSES TO PHQ QUESTIONS 1-9: 9

## 2019-05-03 ASSESSMENT — ANXIETY QUESTIONNAIRES: GAD7 TOTAL SCORE: 6

## 2019-06-10 ENCOUNTER — MYC MEDICAL ADVICE (OUTPATIENT)
Dept: FAMILY MEDICINE | Facility: CLINIC | Age: 54
End: 2019-06-10

## 2019-06-12 ENCOUNTER — OFFICE VISIT (OUTPATIENT)
Dept: FAMILY MEDICINE | Facility: CLINIC | Age: 54
End: 2019-06-12
Payer: COMMERCIAL

## 2019-06-12 VITALS
WEIGHT: 156 LBS | DIASTOLIC BLOOD PRESSURE: 82 MMHG | RESPIRATION RATE: 20 BRPM | TEMPERATURE: 98.8 F | BODY MASS INDEX: 28.71 KG/M2 | HEIGHT: 62 IN | SYSTOLIC BLOOD PRESSURE: 132 MMHG | HEART RATE: 80 BPM

## 2019-06-12 DIAGNOSIS — M25.522 LEFT ELBOW PAIN: ICD-10-CM

## 2019-06-12 DIAGNOSIS — F41.1 GAD (GENERALIZED ANXIETY DISORDER): ICD-10-CM

## 2019-06-12 DIAGNOSIS — I80.8 THROMBOPHLEBITIS ARM: Primary | ICD-10-CM

## 2019-06-12 DIAGNOSIS — F32.0 MILD MAJOR DEPRESSION (H): ICD-10-CM

## 2019-06-12 PROCEDURE — 99214 OFFICE O/P EST MOD 30 MIN: CPT | Performed by: FAMILY MEDICINE

## 2019-06-12 ASSESSMENT — ANXIETY QUESTIONNAIRES
1. FEELING NERVOUS, ANXIOUS, OR ON EDGE: NOT AT ALL
6. BECOMING EASILY ANNOYED OR IRRITABLE: NOT AT ALL
4. TROUBLE RELAXING: NOT AT ALL
7. FEELING AFRAID AS IF SOMETHING AWFUL MIGHT HAPPEN: NOT AT ALL
GAD7 TOTAL SCORE: 0
7. FEELING AFRAID AS IF SOMETHING AWFUL MIGHT HAPPEN: NOT AT ALL
GAD7 TOTAL SCORE: 0
2. NOT BEING ABLE TO STOP OR CONTROL WORRYING: NOT AT ALL
5. BEING SO RESTLESS THAT IT IS HARD TO SIT STILL: NOT AT ALL
3. WORRYING TOO MUCH ABOUT DIFFERENT THINGS: NOT AT ALL
GAD7 TOTAL SCORE: 0

## 2019-06-12 ASSESSMENT — PATIENT HEALTH QUESTIONNAIRE - PHQ9
SUM OF ALL RESPONSES TO PHQ QUESTIONS 1-9: 2
SUM OF ALL RESPONSES TO PHQ QUESTIONS 1-9: 2

## 2019-06-12 ASSESSMENT — PAIN SCALES - GENERAL: PAINLEVEL: NO PAIN (0)

## 2019-06-12 ASSESSMENT — MIFFLIN-ST. JEOR: SCORE: 1277.24

## 2019-06-12 NOTE — PROGRESS NOTES
"Subjective     Gabriella Jeffries is a 53 year old female who presents to clinic today for the following health issues:    HPI   Depression and Anxiety Follow-Up    How are you doing with your depression since your last visit? Improved     How are you doing with your anxiety since your last visit?  Improved     Are you having other symptoms that might be associated with depression or anxiety? No    Have you had a significant life event? No     Do you have any concerns with your use of alcohol or other drugs? No    Social History     Tobacco Use     Smoking status: Never Smoker     Smokeless tobacco: Never Used   Substance Use Topics     Alcohol use: Yes     Comment: Social / 1 or 2 per week      Drug use: No     PHQ 3/29/2019 5/2/2019 6/12/2019   PHQ-9 Total Score 13 9 2   Q9: Thoughts of better off dead/self-harm past 2 weeks Not at all Not at all Not at all     DIANA-7 SCORE 3/29/2019 5/2/2019 6/12/2019   Total Score - 6 (mild anxiety) 0 (minimal anxiety)   Total Score 11 6 0     Suicide Assessment Five-step Evaluation and Treatment (SAFE-T)    Amount of exercise or physical activity: 4-5 days/week for an average of 20-30 minutes    Problems taking medications regularly: No    Medication side effects: none    Diet: regular (no restrictions)      Concern - right forearm  Onset: 3-4 days     Description:   Patient states she noticed one lump on the right arm and today she felt another lump right below it.   Tender to the touch.     Intensity: N/A    Progression of Symptoms:  worsening    Accompanying Signs & Symptoms:      Previous history of similar problem:   On right upper leg \"it been there for a while now \"     Precipitating factors:   Worsened by:     Alleviating factors:  Improved by:     Therapies Tried and outcome: none   The patient states that this weekend she had right arm soreness. She notes that she has a lump that is tender to touch, and noticed another one earlier today. She states that it feels like a " "bruise, but it isn't. She declines any injury or itchiness.   The patient notes that she feels \"weird\".     Left elbow pain  Patient reports having left elbow pain. She notes that she cannot lift a coffee cup due to the pain. She states that the pain \"really hurts\" and her elbow feels \"like cement\".  She reports having an MRI completed and showed a tendon tear. She notes using an elbow strap. She states that she consulted Occupational Therapy with no relief.     Patient Active Problem List   Diagnosis     Psoriasis     CARDIOVASCULAR SCREENING; LDL GOAL LESS THAN 160     Abdominal pain     Vaginal odor     Incontinence     IBS (irritable colon syndrome)     Abnormal LFTs     Disorder of bursae and tendons in shoulder region     Left shoulder pain     Esophageal reflux     H/O: hysterectomy     Left elbow pain     Lateral epicondylitis, unspecified laterality     Elevated blood pressure reading without diagnosis of hypertension     Mild major depression (H)     DIANA (generalized anxiety disorder)     Past Surgical History:   Procedure Laterality Date     ABDOMEN SURGERY  2016    lysis of adhesions for bowel obstruction     C/SECTION, LOW TRANSVERSE      , Low Transverse     HYSTERECTOMY, VAGINAL  2006    has ovaries, due to bleeding     SHOULDER SURGERY Right 10/3/14    SAD/DCE     SURGICAL HISTORY OF -   2006    rectocele and TVT     SURGICAL HISTORY OF -       left lumpectomy       Social History     Tobacco Use     Smoking status: Never Smoker     Smokeless tobacco: Never Used   Substance Use Topics     Alcohol use: Yes     Comment: Social / 1 or 2 per week      Family History   Problem Relation Age of Onset     Heart Disease Mother         defibrillator     Cardiovascular Mother         defibrillator     Cardiovascular Father         valve replacement     Hypertension Father      Eye Disorder Father         maccular degeneration     Heart Disease Father         valve replacement     Lipids Father  "     Genitourinary Problems Brother         CKD     Cancer Brother         leukemia     Asthma No family hx of      C.A.D. No family hx of      Diabetes No family hx of      Cerebrovascular Disease No family hx of      Breast Cancer No family hx of      Cancer - colorectal No family hx of      Prostate Cancer No family hx of      Alzheimer Disease No family hx of      Arthritis No family hx of      Blood Disease No family hx of      Circulatory No family hx of      Gastrointestinal Disease No family hx of      Musculoskeletal Disorder No family hx of      Neurologic Disorder No family hx of      Respiratory No family hx of      Thyroid Disease No family hx of      Alcohol/Drug No family hx of      Allergies No family hx of      Anesthesia Reaction No family hx of      Congenital Anomalies No family hx of      Connective Tissue Disorder No family hx of      Other Cancer No family hx of      Depression No family hx of      Anxiety Disorder No family hx of      Mental Illness No family hx of      Substance Abuse No family hx of      Osteoporosis No family hx of      Genetic Disorder No family hx of      Obesity No family hx of          Current Outpatient Medications   Medication Sig Dispense Refill     citalopram (CELEXA) 20 MG tablet Take 1 tablet (20 mg) by mouth daily 30 tablet 2     clobetasol (TEMOVATE) 0.05 % ointment Apply  topically 2 times daily as needed. Apply sparingly to affected area.  Do not apply to face. 45 g 1     Allergies   Allergen Reactions     No Known Drug Allergy      Recent Labs   Lab Test 04/25/17  0850 03/02/16 02/16/16  1441 11/21/12  1541 05/04/12  1016 12/08/11  0941 09/21/11  0831   *  --  126*  --   --   --   --    HDL 50  --  46*  --   --   --   --    TRIG 72  --  156*  --   --   --   --    ALT  --   --   --   --  13 115* 16   CR  --  0.71  --   --   --  0.75 0.76   GFRESTIMATED  --   --   --   --   --  84 82   GFRESTBLACK  --   --   --   --   --  >90 >90   POTASSIUM  --  3.9  --  "  --   --  4.5 4.2   TSH 1.03  --   --  1.15 1.08  --  1.16      BP Readings from Last 3 Encounters:   06/12/19 (!) 144/98   05/02/19 138/84   03/29/19 142/86    Wt Readings from Last 3 Encounters:   06/12/19 70.8 kg (156 lb)   05/02/19 70.6 kg (155 lb 11.2 oz)   03/29/19 70.9 kg (156 lb 6.4 oz)        Reviewed and updated as needed this visit by Provider         Review of Systems   ROS COMP: CONSTITUTIONAL: NEGATIVE for fever, chills, change in weight  ENT/MOUTH: NEGATIVE for ear, mouth and throat problems  RESP: NEGATIVE for significant cough or SOB  CV: NEGATIVE for chest pain, palpitations or peripheral edema  MUSCULOSKELETAL: NEGATIVE for significant arthralgias or myalgia; POSITIVE for right arm pain and left elbow pain   PSYCH: NEGATIVE for changes in mood or affect     This document serves as a record of the services and decisions personally performed and made by Erika Delgado MD. It was created on her behalf by Rin Prabhakar, a trained medical scribe. The creation of this document is based the provider's statements to the medical scribe.    Rin Prabhakar June 12, 2019 6:10 PM        Objective    BP (!) 144/98   Pulse 80   Temp 98.8  F (37.1  C) (Temporal)   Resp 20   Ht 1.585 m (5' 2.4\")   Wt 70.8 kg (156 lb)   LMP  (LMP Unknown)   BMI 28.17 kg/m    Body mass index is 28.17 kg/m .  Physical Exam   GENERAL: healthy, alert and no distress  MS: no gross musculoskeletal defects noted, no edema  SKIN: no suspicious lesions or rashes, right ulnar aspect of forearm has two tender lumps noted that is subcutaneous, no overlying erythema or warmth noted, appears to be within a vein, lumps have slight bluish hue to them, no other lumps noted that are surrounding   PSYCH: mentation appears normal, affect normal/bright    Diagnostic Test Results:  No results found for this or any previous visit (from the past 24 hour(s)).        Assessment & Plan     1. Thrombophlebitis arm  Patient reports having right arm " "soreness- started on weekend.   She reports finding lump on right arm- tender to touch.  Patient reports finding another lump on right arm prior to today's office visit.   She reports \"feeling like a bruise but it isn't\".  She declines having any injury or itchiness.   Patient reports \"feeling weird\" with lumps on right arm.   Discussed plan with patient.  Patient agreed.   Exam showed right ulnar aspect of forearm has two tender lumps noted that is subcutaneous.  No overlying erythema or warmth noted.  Appears to be within a vein.  Lumps have slight bluish hue to them.   No other lumps noted that are surrounding.    Advised patient to apply ice or heat 10-15 minutes twice or more daily to area of pain.  Recommend Ibuprofen 600mg (3 pills of over the counter strength) three times daily with food for a week. Stop for any stomach irritation.  If symptoms worsen or persist, patient will contact me.     2. Left elbow pain  Patient reports having left elbow pain that is ongoing from 3/29/2019.  She reports \"being unable to lift coffee cup due to pain\".  Patient reports \"pain really hurts and elbow feels like cement\".  She reports having MRI completed- showed tendon tear.  Patient reports utilizing elbow strap and consulting Occupational Therapy- with no relief of symptoms.   Discussed plan with patient.  Patient agreed.   Referral for Ortho has been placed.  Patient will schedule appointment.   - ORTHO  REFERRAL    3. DIANA (generalized anxiety disorder)  Doing well. Well controlled. Tolerating medication.  No change in plan.     4. Mild major depression (H)  Doing well. Well controlled. Tolerating medication.  No change in plan.      BMI:   Estimated body mass index is 28.17 kg/m  as calculated from the following:    Height as of this encounter: 1.585 m (5' 2.4\").    Weight as of this encounter: 70.8 kg (156 lb).   Weight management plan: Discussed healthy diet and exercise guidelines    Follow up- Come back in 3 " months for physical exam.     No follow-ups on file.     The information in this document, created by the medical scribe for me, accurately reflects the services I personally performed and the decisions made by me. I have reviewed and approved this document for accuracy prior to leaving the patient care area.    MATILDA PAULA MD, MD  [facility]

## 2019-06-13 ASSESSMENT — ANXIETY QUESTIONNAIRES: GAD7 TOTAL SCORE: 0

## 2019-07-15 NOTE — PROGRESS NOTES
Discharge Summary - Hand Therapy    Patient did not return to therapy. Please refer to the last SOAP note for objective details and goal achievement.  We will assume that patient's goals were met.    D/C from [facility].

## 2019-08-13 ENCOUNTER — MYC MEDICAL ADVICE (OUTPATIENT)
Dept: FAMILY MEDICINE | Facility: CLINIC | Age: 54
End: 2019-08-13

## 2019-08-29 ENCOUNTER — MYC MEDICAL ADVICE (OUTPATIENT)
Dept: FAMILY MEDICINE | Facility: CLINIC | Age: 54
End: 2019-08-29

## 2019-09-05 ENCOUNTER — MYC MEDICAL ADVICE (OUTPATIENT)
Dept: FAMILY MEDICINE | Facility: CLINIC | Age: 54
End: 2019-09-05

## 2019-09-05 ASSESSMENT — PATIENT HEALTH QUESTIONNAIRE - PHQ9
10. IF YOU CHECKED OFF ANY PROBLEMS, HOW DIFFICULT HAVE THESE PROBLEMS MADE IT FOR YOU TO DO YOUR WORK, TAKE CARE OF THINGS AT HOME, OR GET ALONG WITH OTHER PEOPLE: NOT DIFFICULT AT ALL
SUM OF ALL RESPONSES TO PHQ QUESTIONS 1-9: 3
SUM OF ALL RESPONSES TO PHQ QUESTIONS 1-9: 3

## 2019-09-11 ASSESSMENT — PATIENT HEALTH QUESTIONNAIRE - PHQ9: SUM OF ALL RESPONSES TO PHQ QUESTIONS 1-9: 3

## 2019-09-19 DIAGNOSIS — F32.0 MILD MAJOR DEPRESSION (H): ICD-10-CM

## 2019-09-19 DIAGNOSIS — F41.1 GAD (GENERALIZED ANXIETY DISORDER): ICD-10-CM

## 2019-09-20 RX ORDER — CITALOPRAM HYDROBROMIDE 20 MG/1
20 TABLET ORAL DAILY
Qty: 30 TABLET | Refills: 0 | Status: SHIPPED | OUTPATIENT
Start: 2019-09-20 | End: 2019-10-18

## 2019-09-20 NOTE — TELEPHONE ENCOUNTER
Pending Prescriptions:                       Disp   Refills    citalopram (CELEXA) 20 MG tablet          30 tab*2            Sig: Take 1 tablet (20 mg) by mouth daily      Medication is being filled for 1 time refill only due to:  Patient needs to be seen because mood F/U.     Please help schedule OV    Isidra Koroma, RN, BSN

## 2019-10-18 DIAGNOSIS — F32.0 MILD MAJOR DEPRESSION (H): ICD-10-CM

## 2019-10-18 DIAGNOSIS — F41.1 GAD (GENERALIZED ANXIETY DISORDER): ICD-10-CM

## 2019-10-18 RX ORDER — CITALOPRAM HYDROBROMIDE 20 MG/1
20 TABLET ORAL DAILY
Qty: 30 TABLET | Refills: 0 | Status: SHIPPED | OUTPATIENT
Start: 2019-10-18 | End: 2020-01-22 | Stop reason: SINTOL

## 2019-10-18 NOTE — TELEPHONE ENCOUNTER
Pending Prescriptions:                       Disp   Refills    citalopram (CELEXA) 20 MG tablet          30 tab*0            Sig: Take 1 tablet (20 mg) by mouth daily    PHQ-9 SCORE 5/2/2019 6/12/2019 9/5/2019   PHQ-9 Total Score Tomer 9 (Mild depression) 2 (Minimal depression) 3 (Minimal depression)   PHQ-9 Total Score 9 2 3     Medication is being filled for 1 time refill only due to:  Patient needs to be seen because mood F/U.      Next 5 appointments (look out 90 days)    Nov 15, 2019  4:20 PM CST  [facility] Erika Delgado MD  [facility] ([facility] [address], [address]  [place] [place] [id number] [phone]        Isidra Koroma, RN, BSN

## 2019-11-02 ENCOUNTER — MYC MEDICAL ADVICE (OUTPATIENT)
Dept: FAMILY MEDICINE | Facility: CLINIC | Age: 54
End: 2019-11-02

## 2019-11-04 ENCOUNTER — OFFICE VISIT (OUTPATIENT)
Dept: FAMILY MEDICINE | Facility: CLINIC | Age: 54
End: 2019-11-04
Payer: COMMERCIAL

## 2019-11-04 VITALS
TEMPERATURE: 98.6 F | DIASTOLIC BLOOD PRESSURE: 90 MMHG | SYSTOLIC BLOOD PRESSURE: 152 MMHG | RESPIRATION RATE: 16 BRPM | WEIGHT: 163.9 LBS | HEIGHT: 63 IN | BODY MASS INDEX: 29.04 KG/M2

## 2019-11-04 DIAGNOSIS — H81.12 BENIGN PAROXYSMAL POSITIONAL VERTIGO, LEFT: Primary | ICD-10-CM

## 2019-11-04 DIAGNOSIS — R21 RASH AND NONSPECIFIC SKIN ERUPTION: ICD-10-CM

## 2019-11-04 DIAGNOSIS — R03.0 ELEVATED BLOOD PRESSURE READING WITHOUT DIAGNOSIS OF HYPERTENSION: ICD-10-CM

## 2019-11-04 DIAGNOSIS — Z00.00 HEALTHCARE MAINTENANCE: ICD-10-CM

## 2019-11-04 PROCEDURE — 99214 OFFICE O/P EST MOD 30 MIN: CPT | Performed by: NURSE PRACTITIONER

## 2019-11-04 RX ORDER — METHYLPREDNISOLONE 4 MG
TABLET, DOSE PACK ORAL
Qty: 21 TABLET | Refills: 0 | Status: SHIPPED | OUTPATIENT
Start: 2019-11-04 | End: 2020-01-22

## 2019-11-04 RX ORDER — HYDROXYZINE HYDROCHLORIDE 25 MG/1
25 TABLET, FILM COATED ORAL 2 TIMES DAILY
Qty: 30 TABLET | Refills: 0 | Status: SHIPPED | OUTPATIENT
Start: 2019-11-04 | End: 2020-01-22

## 2019-11-04 RX ORDER — MECLIZINE HYDROCHLORIDE 25 MG/1
25 TABLET ORAL 3 TIMES DAILY PRN
Qty: 30 TABLET | Refills: 0 | Status: SHIPPED | OUTPATIENT
Start: 2019-11-04 | End: 2020-07-08

## 2019-11-04 ASSESSMENT — ANXIETY QUESTIONNAIRES
6. BECOMING EASILY ANNOYED OR IRRITABLE: NOT AT ALL
7. FEELING AFRAID AS IF SOMETHING AWFUL MIGHT HAPPEN: NOT AT ALL
3. WORRYING TOO MUCH ABOUT DIFFERENT THINGS: SEVERAL DAYS
5. BEING SO RESTLESS THAT IT IS HARD TO SIT STILL: NOT AT ALL
2. NOT BEING ABLE TO STOP OR CONTROL WORRYING: SEVERAL DAYS
1. FEELING NERVOUS, ANXIOUS, OR ON EDGE: SEVERAL DAYS
GAD7 TOTAL SCORE: 4
IF YOU CHECKED OFF ANY PROBLEMS ON THIS QUESTIONNAIRE, HOW DIFFICULT HAVE THESE PROBLEMS MADE IT FOR YOU TO DO YOUR WORK, TAKE CARE OF THINGS AT HOME, OR GET ALONG WITH OTHER PEOPLE: NOT DIFFICULT AT ALL

## 2019-11-04 ASSESSMENT — MIFFLIN-ST. JEOR: SCORE: 1317.45

## 2019-11-04 ASSESSMENT — PATIENT HEALTH QUESTIONNAIRE - PHQ9
5. POOR APPETITE OR OVEREATING: SEVERAL DAYS
SUM OF ALL RESPONSES TO PHQ QUESTIONS 1-9: 4

## 2019-11-04 NOTE — TELEPHONE ENCOUNTER
Gabriella Jeffries is a 54 year old female who calls with hives, nausea.    NURSING ASSESSMENT:  Description:  I spoke with the pt who states she has had hives for 2 weeks. They do not go away. They itch a lot. On both legs, going to torso also. No swelling, no difficulty breathing. They do move around. Never completely go away. No new soaps, lotions, or medications. No changes in the last couple of weeks.   Associated symptoms:  Has had nausea for about 1 month, rides bus to work and feel sick after. Not only when she rides in a car or bus. Feels shaky, this has also been about a month.   Improves/worsens symptoms:  benadryl at night to sleep.  Allergies:   Allergies   Allergen Reactions     No Known Drug Allergy      NURSING PLAN: Nursing advice to patient Do not make any medication changes until she meets with a provider    RECOMMENDED DISPOSITION:  See in 24 hours  Will comply with recommendation: Yes  If further questions/concerns or if symptoms do not improve, worsen or new symptoms develop, call your PCP or [facility] as soon as possible.      Guideline used:  Telephone Triage Protocols for Nurses, Fifth Edition, Dora Koroma, RN, RN

## 2019-11-04 NOTE — PROGRESS NOTES
Subjective     Gabriella Jeffries is a 54 year old female who presents to clinic today for the following health issues:    HPI   HIVES  Onset: about 2 weeks    Description:   Location: all over  Itching (Pruritis): YES    Progression of Symptoms:  same    Accompanying Signs & Symptoms:  Fever: no   Body aches or joint pain: no   Sore throat symptoms: no   Recent cold symptoms: no     History:   Previous similar hives: no     Precipitating factors:   New exposures: None   Recent travel: no       Therapies Tried and outcome: benadryl     Additional HPI:  Gabriella is a 54-year old female who presents to the clinic today with multiple concerns.  Her first concern is a rash which is been present for over 2 weeks now.  Denies difficulty swallowing, shortness of breath, or any other acute distress.  She does endorse severe itching with her rash.  No new medications, soaps, lotions, foods.  No recent gardening or exposure to new animals.  No recent travel or illnesses.  She thinks her rash may be related to the Celexa.  She has been taking it since March 29, 2019, 20 mg daily.  She stopped taking Celexa few days ago, stating she knows she should have weaned herself off, but thought it was the causing her rash.  Denies adverse side effects from abrupt discontinuation at this time.  She reports feeling unusually tired and nauseated for a few weeks.  When asked about tinnitus and/or hearing loss, she replies yes to both.    She has a physical scheduled with her primary care provider on 15 November.  She is due for a physical, labs, and immunizations.  She declines the influenza vaccine today.    Patient Active Problem List   Diagnosis     Psoriasis     CARDIOVASCULAR SCREENING; LDL GOAL LESS THAN 160     Abdominal pain     Vaginal odor     Incontinence     IBS (irritable colon syndrome)     Abnormal LFTs     Disorder of bursae and tendons in shoulder region     Left shoulder pain     Esophageal reflux     H/O: hysterectomy     Left  elbow pain     Lateral epicondylitis, unspecified laterality     Elevated blood pressure reading without diagnosis of hypertension     Mild major depression (H)     DIANA (generalized anxiety disorder)     Past Surgical History:   Procedure Laterality Date     ABDOMEN SURGERY  2016    lysis of adhesions for bowel obstruction     C/SECTION, LOW TRANSVERSE      , Low Transverse     HYSTERECTOMY, VAGINAL  2006    has ovaries, due to bleeding     SHOULDER SURGERY Right 10/3/14    SAD/DCE     SURGICAL HISTORY OF -   2006    rectocele and TVT     SURGICAL HISTORY OF -       left lumpectomy       Social History     Tobacco Use     Smoking status: Never Smoker     Smokeless tobacco: Never Used   Substance Use Topics     Alcohol use: Yes     Comment: Social / 1 or 2 per week      Family History   Problem Relation Age of Onset     Heart Disease Mother         defibrillator     Cardiovascular Mother         defibrillator     Cardiovascular Father         valve replacement     Hypertension Father      Eye Disorder Father         maccular degeneration     Heart Disease Father         valve replacement     Lipids Father      Genitourinary Problems Brother         CKD     Cancer Brother         leukemia     Asthma No family hx of      C.A.D. No family hx of      Diabetes No family hx of      Cerebrovascular Disease No family hx of      Breast Cancer No family hx of      Cancer - colorectal No family hx of      Prostate Cancer No family hx of      Alzheimer Disease No family hx of      Arthritis No family hx of      Blood Disease No family hx of      Circulatory No family hx of      Gastrointestinal Disease No family hx of      Musculoskeletal Disorder No family hx of      Neurologic Disorder No family hx of      Respiratory No family hx of      Thyroid Disease No family hx of      Alcohol/Drug No family hx of      Allergies No family hx of      Anesthesia Reaction No family hx of      Congenital Anomalies No family hx  of      Connective Tissue Disorder No family hx of      Other Cancer No family hx of      Depression No family hx of      Anxiety Disorder No family hx of      Mental Illness No family hx of      Substance Abuse No family hx of      Osteoporosis No family hx of      Genetic Disorder No family hx of      Obesity No family hx of          Current Outpatient Medications   Medication Sig Dispense Refill     citalopram (CELEXA) 20 MG tablet Take 1 tablet (20 mg) by mouth daily 30 tablet 0     clobetasol (TEMOVATE) 0.05 % ointment Apply  topically 2 times daily as needed. Apply sparingly to affected area.  Do not apply to face. 45 g 1     hydrOXYzine (ATARAX) 25 MG tablet Take 1 tablet (25 mg) by mouth 2 times daily 30 tablet 0     meclizine (ANTIVERT) 25 MG tablet Take 1 tablet (25 mg) by mouth 3 times daily as needed for dizziness 30 tablet 0     methylPREDNISolone (MEDROL DOSEPAK) 4 MG tablet therapy pack Follow Package Directions 21 tablet 0     Allergies   Allergen Reactions     No Known Drug Allergy      Recent Labs   Lab Test 04/25/17  0850 03/02/16 02/16/16  1441 11/21/12  1541 05/04/12  1016 12/08/11  0941 09/21/11  0831   *  --  126*  --   --   --   --    HDL 50  --  46*  --   --   --   --    TRIG 72  --  156*  --   --   --   --    ALT  --   --   --   --  13 115* 16   CR  --  0.71  --   --   --  0.75 0.76   GFRESTIMATED  --   --   --   --   --  84 82   GFRESTBLACK  --   --   --   --   --  >90 >90   POTASSIUM  --  3.9  --   --   --  4.5 4.2   TSH 1.03  --   --  1.15 1.08  --  1.16      BP Readings from Last 3 Encounters:   11/04/19 (!) 152/90   06/12/19 132/82   05/02/19 138/84    Wt Readings from Last 3 Encounters:   11/04/19 74.3 kg (163 lb 14.4 oz)   06/12/19 70.8 kg (156 lb)   05/02/19 70.6 kg (155 lb 11.2 oz)                    Reviewed and updated as needed this visit by Provider  Tobacco  Allergies  Meds  Problems  Med Hx  Surg Hx  Fam Hx         Review of Systems   ROS COMP: Constitutional,  "HEENT, cardiovascular, pulmonary, gi and gu systems are negative, except as otherwise noted.      Objective    BP (!) 152/90   Temp 98.6  F (37  C) (Temporal)   Resp 16   Ht 1.6 m (5' 2.99\")   Wt 74.3 kg (163 lb 14.4 oz)   LMP  (LMP Unknown)   BMI 29.04 kg/m    Body mass index is 29.04 kg/m .  Physical Exam   GENERAL: healthy, alert and no distress  EYES: Eyes grossly normal to inspection, PERRL and conjunctivae and sclerae normal  HENT: normal cephalic/atraumatic, ear canals and TM's normal, nose and mouth without ulcers or lesions, oropharynx clear, oral mucous membranes moist   RESP: lungs clear to auscultation - no rales, rhonchi or wheezes  CV: regular rate and rhythm, normal S1 S2, no S3 or S4, no murmur, click or rub, no peripheral edema and peripheral pulses strong  MS: no gross musculoskeletal defects noted, no edema  SKIN: Multiple discrete, less than 1 cm papules noted - mainly on bilateral lower extremities.  Approximately 3-4 noted on back and 2 noted on right upper arm  NEURO: Normal strength and tone, mentation intact and speech normal  PSYCH: mentation appears normal, affect normal/bright    Diagnostic Test Results:  Labs reviewed in Epic  none         Assessment & Plan     1. Benign paroxysmal positional vertigo, left  Gabriella became extremely nauseated when performing the Fiona-Hallpike maneuver on the left side in clinic today.  Did not observe any rotational nystagmus; however, we had to stop before a full 30 seconds and let her sit up due to nausea.  Symptoms are consistent with either BPPV or Ménière's disease.  I discussed possibility of Ménière's disease with Gabriella especially in light of reported tinnitus and hearing loss.  Prescribed the following for dizziness today and provided handout on right and left-sided Epley maneuvers.  Educated to do this 3 x daily both sides at home.  Advised to follow-up for symptoms that are not improving or becoming worse.  She voiced understanding and " "agreement with treatment plan.  - meclizine (ANTIVERT) 25 MG tablet; Take 1 tablet (25 mg) by mouth 3 times daily as needed for dizziness  Dispense: 30 tablet; Refill: 0    2. Rash and nonspecific skin eruption  Unclear etiology of rash. Looks inflammatory - does not appear infectious. Prescribed a Medrol Dosepak as well as hydroxyzine for severe itching.  Advised not to drive with Hydroxyzine and to follow-up with her PCP for symptoms that are not improving or becoming worse.  - methylPREDNISolone (MEDROL DOSEPAK) 4 MG tablet therapy pack; Follow Package Directions  Dispense: 21 tablet; Refill: 0  - hydrOXYzine (ATARAX) 25 MG tablet; Take 1 tablet (25 mg) by mouth 2 times daily  Dispense: 30 tablet; Refill: 0    3. Elevated blood pressure reading without diagnosis of hypertension  Elevated BP reading today in clinic.  She denies headache, chest pain, shortness of breath or any other acute distress.  She has had elevated readings in the past.  Advised to follow-up with her PCP and she has an appointment to see her on 15 November 2019.    4. Healthcare maintenance  Gabriella has a scheduled physical with her PCP on 15 November 2019.  She is due for a physical, labs, and immunizations.  Declined flu shot today.  She had multiple complaints today, and I advised her to ensure she keeps her appointment to follow-up regarding rash, vertigo, and tiredness.  I told her to ensure she is fasting as lab work will help determine potential cause of some of her symptoms - especially tiredness.  Advised to go to the ED for any symptoms that are worsening.   She voiced understanding.       BMI:   Estimated body mass index is 29.04 kg/m  as calculated from the following:    Height as of this encounter: 1.6 m (5' 2.99\").    Weight as of this encounter: 74.3 kg (163 lb 14.4 oz).           Patient Instructions   Please do Epley Maneuvers at home 3x daily.  Meclizine for dizziness.  Medrol Dose Pack for rash and Hydroxyzine for " itching.    Please keep you appointment on 15 November 2019 with Dr. Delgado.    Please call or return to clinic for any questions, concerns, or symptoms that are not improving or becoming worse.    Tanya Cedeno CNP        Return in about 3 days (around 11/7/2019) for Symptoms Not Improving/Getting Worse.    Tanya Cedeno CNP  [facility], [place]

## 2019-11-05 ASSESSMENT — ANXIETY QUESTIONNAIRES: GAD7 TOTAL SCORE: 4

## 2019-11-12 ASSESSMENT — ENCOUNTER SYMPTOMS
PALPITATIONS: 0
ABDOMINAL PAIN: 0
COUGH: 0
CONSTIPATION: 0
CHILLS: 1
NERVOUS/ANXIOUS: 1
DYSURIA: 0
EYE PAIN: 0
WEAKNESS: 1
DIZZINESS: 1
BREAST MASS: 0
HEMATURIA: 0
HEMATOCHEZIA: 0
FEVER: 0
DIARRHEA: 0
JOINT SWELLING: 0
SORE THROAT: 0
HEARTBURN: 1
ARTHRALGIAS: 0
MYALGIAS: 0
PARESTHESIAS: 0
NAUSEA: 1
FREQUENCY: 1
HEADACHES: 1
SHORTNESS OF BREATH: 0

## 2019-11-12 NOTE — PROGRESS NOTES
"   SUBJECTIVE:   CC: Gabriella Jeffries is an 54 year old woman who presents for preventive health visit.     Healthy Habits:     Getting at least 3 servings of Calcium per day:  NO    Bi-annual eye exam:  Yes    Dental care twice a year:  Yes    Sleep apnea or symptoms of sleep apnea:  None    Diet:  Regular (no restrictions)    Frequency of exercise:  4-5 days/week    Duration of exercise:  Less than 15 minutes    Taking medications regularly:  Yes    Medication side effects:  Not applicable    PHQ-2 Total Score: 0    Additional concerns today:  Yes (Her hives are returning. )    Patient stopped taking her anxiety and depression medications.    Hives  The patient states that her rash is coming back. She declines use of soaps, detergent or new pets.     Mood  The patient discontinued her anxiety and depression medications, as \"she does not want to be on them\".     Left sided pain  The patient states that she has left sided pain from time to time. She notes that this is ongoing for the past couple years. She notes that the pain can feel worse from time to time.     Dizziness   The patient occassionally takes Meclizine for her dizziness. She notes that she gets very fatigued from the medication.   She states that the dizziness is still present.     Exercise   The patient is not currently exercising.     Neck pain  The patient notes that she had a cortisone injection for her neck. She notes that she is working on strengthening, but noted some relief with injection.     Today's PHQ-2 Score:   PHQ-2 ( 1999 [employer]) 11/12/2019   Q1: Little interest or pleasure in doing things 0   Q2: Feeling down, depressed or hopeless 0   PHQ-2 Score 0   Q1: Little interest or pleasure in doing things Not at all   Q2: Feeling down, depressed or hopeless Not at all   PHQ-2 Score 0       Abuse: Current or Past(Physical, Sexual or Emotional)- No  Do you feel safe in your environment? Yes        Social History     Tobacco Use     Smoking status: " Never Smoker     Smokeless tobacco: Never Used   Substance Use Topics     Alcohol use: Yes     Comment: Social / 1 or 2 per week      If you drink alcohol do you typically have >3 drinks per day or >7 drinks per week? No    No flowsheet data found.    Reviewed orders with patient.  Reviewed health maintenance and updated orders accordingly - Yes  BP Readings from Last 3 Encounters:   11/15/19 122/70   19 (!) 152/90   19 132/82    Wt Readings from Last 3 Encounters:   11/15/19 73.3 kg (161 lb 9.6 oz)   19 74.3 kg (163 lb 14.4 oz)   19 70.8 kg (156 lb)                  Patient Active Problem List   Diagnosis     Psoriasis     CARDIOVASCULAR SCREENING; LDL GOAL LESS THAN 160     Abdominal pain     Vaginal odor     Incontinence     IBS (irritable colon syndrome)     Abnormal LFTs     Disorder of bursae and tendons in shoulder region     Left shoulder pain     Esophageal reflux     H/O: hysterectomy     Left elbow pain     Lateral epicondylitis, unspecified laterality     Elevated blood pressure reading without diagnosis of hypertension     Mild major depression (H)     DIANA (generalized anxiety disorder)     Past Surgical History:   Procedure Laterality Date     ABDOMEN SURGERY  2016    lysis of adhesions for bowel obstruction     C/SECTION, LOW TRANSVERSE      , Low Transverse     HYSTERECTOMY, VAGINAL  2006    has ovaries, due to bleeding     SHOULDER SURGERY Right 10/3/14    SAD/DCE     SURGICAL HISTORY OF -   2006    rectocele and TVT     SURGICAL HISTORY OF -       left lumpectomy       Social History     Tobacco Use     Smoking status: Never Smoker     Smokeless tobacco: Never Used   Substance Use Topics     Alcohol use: Yes     Comment: Social / 1 or 2 per week      Family History   Problem Relation Age of Onset     Heart Disease Mother         defibrillator     Cardiovascular Mother         defibrillator     Cardiovascular Father         valve replacement     Hypertension  Father      Eye Disorder Father         maccular degeneration     Heart Disease Father         valve replacement     Lipids Father      Genitourinary Problems Brother         CKD     Cancer Brother         leukemia     Asthma No family hx of      C.A.D. No family hx of      Diabetes No family hx of      Cerebrovascular Disease No family hx of      Breast Cancer No family hx of      Cancer - colorectal No family hx of      Prostate Cancer No family hx of      Alzheimer Disease No family hx of      Arthritis No family hx of      Blood Disease No family hx of      Circulatory No family hx of      Gastrointestinal Disease No family hx of      Musculoskeletal Disorder No family hx of      Neurologic Disorder No family hx of      Respiratory No family hx of      Thyroid Disease No family hx of      Alcohol/Drug No family hx of      Allergies No family hx of      Anesthesia Reaction No family hx of      Congenital Anomalies No family hx of      Connective Tissue Disorder No family hx of      Other Cancer No family hx of      Depression No family hx of      Anxiety Disorder No family hx of      Mental Illness No family hx of      Substance Abuse No family hx of      Osteoporosis No family hx of      Genetic Disorder No family hx of      Obesity No family hx of          Current Outpatient Medications   Medication Sig Dispense Refill     clobetasol (TEMOVATE) 0.05 % ointment Apply  topically 2 times daily as needed. Apply sparingly to affected area.  Do not apply to face. 45 g 1     meclizine (ANTIVERT) 25 MG tablet Take 1 tablet (25 mg) by mouth 3 times daily as needed for dizziness 30 tablet 0     citalopram (CELEXA) 20 MG tablet Take 1 tablet (20 mg) by mouth daily (Patient not taking: Reported on 11/15/2019) 30 tablet 0     hydrOXYzine (ATARAX) 25 MG tablet Take 1 tablet (25 mg) by mouth 2 times daily (Patient not taking: Reported on 11/15/2019) 30 tablet 0     methylPREDNISolone (MEDROL DOSEPAK) 4 MG tablet therapy pack  Follow Package Directions (Patient not taking: Reported on 11/15/2019) 21 tablet 0     Allergies   Allergen Reactions     No Known Drug Allergy      Recent Labs   Lab Test 04/25/17  0850 03/02/16 02/16/16  1441 11/21/12  1541 05/04/12  1016 12/08/11  0941 09/21/11  0831   *  --  126*  --   --   --   --    HDL 50  --  46*  --   --   --   --    TRIG 72  --  156*  --   --   --   --    ALT  --   --   --   --  13 115* 16   CR  --  0.71  --   --   --  0.75 0.76   GFRESTIMATED  --   --   --   --   --  84 82   GFRESTBLACK  --   --   --   --   --  >90 >90   POTASSIUM  --  3.9  --   --   --  4.5 4.2   TSH 1.03  --   --  1.15 1.08  --  1.16        Mammogram Screening: Patient over age 50, mutual decision to screen reflected in health maintenance.    Pertinent mammograms are reviewed under the imaging tab.  History of abnormal Pap smear: NO - age 30-65 PAP every 5 years with negative HPV co-testing recommended     Reviewed and updated as needed this visit by clinical staff  Tobacco  Allergies  Meds  Med Hx  Surg Hx  Fam Hx  Soc Hx        Reviewed and updated as needed this visit by Provider        Review of Systems   Constitutional: Positive for chills. Negative for fever.   HENT: Positive for hearing loss. Negative for congestion, ear pain and sore throat.    Eyes: Negative for pain and visual disturbance.   Respiratory: Negative for cough and shortness of breath.    Cardiovascular: Negative for chest pain, palpitations and peripheral edema.   Gastrointestinal: Positive for heartburn and nausea. Negative for abdominal pain, constipation, diarrhea and hematochezia.   Breasts:  Negative for tenderness, breast mass and discharge.   Genitourinary: Positive for frequency and urgency. Negative for dysuria, genital sores, hematuria, pelvic pain, vaginal bleeding and vaginal discharge.   Musculoskeletal: Negative for arthralgias, joint swelling and myalgias.   Skin: Negative for rash.   Neurological: Positive for  "dizziness, weakness and headaches. Negative for paresthesias.   Psychiatric/Behavioral: Positive for mood changes. The patient is nervous/anxious.      This document serves as a record of the services and decisions personally performed and made by Erika Delgado MD. It was created on her behalf by Rin Prabhakar, a trained medical scribe. The creation of this document is based the provider's statements to the medical scribe.    Rin Prabhakar November 15, 2019 4:49 PM   OBJECTIVE:   /70   Pulse 74   Temp 98.5  F (36.9  C) (Temporal)   Resp 12   Ht 1.603 m (5' 3.11\")   Wt 73.3 kg (161 lb 9.6 oz)   LMP  (LMP Unknown)   SpO2 100%   BMI 28.53 kg/m    Physical Exam  GENERAL APPEARANCE: healthy, alert and no distress  EYES: Eyes grossly normal to inspection, PERRL and conjunctivae and sclerae normal  HENT: ear canals and TM's normal, nose and mouth without ulcers or lesions, oropharynx clear and oral mucous membranes moist  NECK: no adenopathy, no asymmetry, masses, or scars and thyroid normal to palpation  RESP: lungs clear to auscultation - no rales, rhonchi or wheezes  BREAST: normal without masses, tenderness or nipple discharge and no palpable axillary masses or adenopathy  CV: regular rate and rhythm, normal S1 S2, no S3 or S4, no murmur, click or rub, no peripheral edema and peripheral pulses strong  ABDOMEN: soft, nontender, no hepatosplenomegaly, no masses and bowel sounds normal  MS: no musculoskeletal defects are noted and gait is age appropriate without ataxia  SKIN: no suspicious lesions or rashes, hives on both arms lower extremities and mildly on back  NEURO: Normal strength and tone, sensory exam grossly normal, mentation intact and speech normal  PSYCH: mentation appears normal and affect normal/bright    Diagnostic Test Results:  No results found for this or any previous visit (from the past 24 hour(s)).    ASSESSMENT/PLAN:   1. Routine general medical examination at a [facility]" "facility  Routine general medical exam was administered today.   See counseling messages below.     2. Urticaria  Patient reports rash is coming back.   Patient previously took Methylprednisolone- which helped relieved some symptoms.  Symptoms recurred.   See exam findings noted above.   Prednisone has been prescribed.   If symptoms worsen or persist, patient will contact me.   - predniSONE (DELTASONE) 20 MG tablet; Take 1 tablet (20 mg) by mouth daily for 5 days, THEN 0.5 tablets (10 mg) daily for 6 days.  Dispense: 8 tablet; Refill: 0    3. Lipid screening  Labs have been ordered.   Awaiting results.   - Lipid panel reflex to direct LDL Fasting    4. Screening for diabetes mellitus  Labs have been ordered.   Will notify with results.   - Comprehensive metabolic panel    COUNSELING:  Special attention given to:        Regular exercise       Healthy diet/nutrition       Vision screening       Hearing screening    Estimated body mass index is 28.53 kg/m  as calculated from the following:    Height as of this encounter: 1.603 m (5' 3.11\").    Weight as of this encounter: 73.3 kg (161 lb 9.6 oz).    Weight management plan: Discussed healthy diet and exercise guidelines     reports that she has never smoked. She has never used smokeless tobacco.      Counseling Resources:  ATP IV Guidelines  Pooled Cohorts Equation Calculator  Breast Cancer Risk Calculator  FRAX Risk Assessment  ICSI Preventive Guidelines  Dietary Guidelines for Americans, 2010  USDA's MyPlate  ASA Prophylaxis  Lung CA Screening    The information in this document, created by the medical scribe for me, accurately reflects the services I personally performed and the decisions made by me. I have reviewed and approved this document for accuracy prior to leaving the patient care area.    Erika Delgado MD  [facility]  "

## 2019-11-15 ENCOUNTER — OFFICE VISIT (OUTPATIENT)
Dept: FAMILY MEDICINE | Facility: CLINIC | Age: 54
End: 2019-11-15
Payer: COMMERCIAL

## 2019-11-15 VITALS
TEMPERATURE: 98.5 F | SYSTOLIC BLOOD PRESSURE: 122 MMHG | OXYGEN SATURATION: 100 % | BODY MASS INDEX: 28.63 KG/M2 | DIASTOLIC BLOOD PRESSURE: 70 MMHG | WEIGHT: 161.6 LBS | RESPIRATION RATE: 12 BRPM | HEIGHT: 63 IN | HEART RATE: 74 BPM

## 2019-11-15 DIAGNOSIS — L50.9 URTICARIA: ICD-10-CM

## 2019-11-15 DIAGNOSIS — Z13.1 SCREENING FOR DIABETES MELLITUS: ICD-10-CM

## 2019-11-15 DIAGNOSIS — Z13.220 LIPID SCREENING: ICD-10-CM

## 2019-11-15 DIAGNOSIS — Z00.00 ROUTINE GENERAL MEDICAL EXAMINATION AT A HEALTH CARE FACILITY: Primary | ICD-10-CM

## 2019-11-15 PROCEDURE — 80053 COMPREHEN METABOLIC PANEL: CPT | Performed by: FAMILY MEDICINE

## 2019-11-15 PROCEDURE — 99396 PREV VISIT EST AGE 40-64: CPT | Performed by: FAMILY MEDICINE

## 2019-11-15 PROCEDURE — 80061 LIPID PANEL: CPT | Performed by: FAMILY MEDICINE

## 2019-11-15 PROCEDURE — 36415 COLL VENOUS BLD VENIPUNCTURE: CPT | Performed by: FAMILY MEDICINE

## 2019-11-15 RX ORDER — PREDNISONE 20 MG/1
TABLET ORAL
Qty: 8 TABLET | Refills: 0 | Status: SHIPPED | OUTPATIENT
Start: 2019-11-15 | End: 2020-03-18

## 2019-11-15 ASSESSMENT — ENCOUNTER SYMPTOMS
SHORTNESS OF BREATH: 0
EYE PAIN: 0
DIZZINESS: 1
PALPITATIONS: 0
MYALGIAS: 0
HEMATURIA: 0
PARESTHESIAS: 0
FEVER: 0
DYSURIA: 0
NAUSEA: 1
HEARTBURN: 1
ABDOMINAL PAIN: 0
JOINT SWELLING: 0
COUGH: 0
HEADACHES: 1
ARTHRALGIAS: 0
DIARRHEA: 0
SORE THROAT: 0
FREQUENCY: 1
HEMATOCHEZIA: 0
BREAST MASS: 0
CONSTIPATION: 0
CHILLS: 1
NERVOUS/ANXIOUS: 1
WEAKNESS: 1

## 2019-11-15 ASSESSMENT — MIFFLIN-ST. JEOR: SCORE: 1308.88

## 2019-11-18 LAB
ALBUMIN SERPL-MCNC: 4.2 G/DL (ref 3.4–5)
ALP SERPL-CCNC: 72 U/L (ref 40–150)
ALT SERPL W P-5'-P-CCNC: 40 U/L (ref 0–50)
ANION GAP SERPL CALCULATED.3IONS-SCNC: 7 MMOL/L (ref 3–14)
AST SERPL W P-5'-P-CCNC: 20 U/L (ref 0–45)
BILIRUB SERPL-MCNC: 0.3 MG/DL (ref 0.2–1.3)
BUN SERPL-MCNC: 11 MG/DL (ref 7–30)
CALCIUM SERPL-MCNC: 9.4 MG/DL (ref 8.5–10.1)
CHLORIDE SERPL-SCNC: 105 MMOL/L (ref 94–109)
CHOLEST SERPL-MCNC: 329 MG/DL
CO2 SERPL-SCNC: 28 MMOL/L (ref 20–32)
CREAT SERPL-MCNC: 0.69 MG/DL (ref 0.52–1.04)
GFR SERPL CREATININE-BSD FRML MDRD: >90 ML/MIN/{1.73_M2}
GLUCOSE SERPL-MCNC: 95 MG/DL (ref 70–99)
HDLC SERPL-MCNC: 66 MG/DL
LDLC SERPL CALC-MCNC: 244 MG/DL
NONHDLC SERPL-MCNC: 263 MG/DL
POTASSIUM SERPL-SCNC: 3.7 MMOL/L (ref 3.4–5.3)
PROT SERPL-MCNC: 7.8 G/DL (ref 6.8–8.8)
SODIUM SERPL-SCNC: 140 MMOL/L (ref 133–144)
TRIGL SERPL-MCNC: 96 MG/DL

## 2020-01-21 ENCOUNTER — MYC MEDICAL ADVICE (OUTPATIENT)
Dept: FAMILY MEDICINE | Facility: CLINIC | Age: 55
End: 2020-01-21

## 2020-01-21 DIAGNOSIS — F41.1 GAD (GENERALIZED ANXIETY DISORDER): Primary | ICD-10-CM

## 2020-01-21 DIAGNOSIS — F32.0 MILD MAJOR DEPRESSION (H): ICD-10-CM

## 2020-01-22 RX ORDER — FLUOXETINE 10 MG/1
10 CAPSULE ORAL DAILY
Qty: 30 CAPSULE | Refills: 1 | Status: SHIPPED | OUTPATIENT
Start: 2020-01-22 | End: 2020-01-22

## 2020-01-22 RX ORDER — CITALOPRAM HYDROBROMIDE 10 MG/1
10 TABLET ORAL DAILY
Qty: 30 TABLET | Refills: 1 | Status: SHIPPED | OUTPATIENT
Start: 2020-01-22 | End: 2020-03-23

## 2020-02-17 ENCOUNTER — OFFICE VISIT (OUTPATIENT)
Dept: FAMILY MEDICINE | Facility: CLINIC | Age: 55
End: 2020-02-17
Payer: COMMERCIAL

## 2020-02-17 VITALS
DIASTOLIC BLOOD PRESSURE: 80 MMHG | HEIGHT: 62 IN | TEMPERATURE: 98.9 F | OXYGEN SATURATION: 96 % | WEIGHT: 165.7 LBS | HEART RATE: 74 BPM | SYSTOLIC BLOOD PRESSURE: 130 MMHG | BODY MASS INDEX: 30.49 KG/M2 | RESPIRATION RATE: 16 BRPM

## 2020-02-17 DIAGNOSIS — Z23 NEED FOR INFLUENZA VACCINATION: ICD-10-CM

## 2020-02-17 DIAGNOSIS — T36.95XA ANTIBIOTIC-INDUCED YEAST INFECTION: ICD-10-CM

## 2020-02-17 DIAGNOSIS — H00.015 HORDEOLUM EXTERNUM OF LEFT LOWER EYELID: ICD-10-CM

## 2020-02-17 DIAGNOSIS — B37.9 ANTIBIOTIC-INDUCED YEAST INFECTION: ICD-10-CM

## 2020-02-17 DIAGNOSIS — J01.10 ACUTE NON-RECURRENT FRONTAL SINUSITIS: Primary | ICD-10-CM

## 2020-02-17 DIAGNOSIS — L40.9 PSORIASIS: ICD-10-CM

## 2020-02-17 PROCEDURE — 99214 OFFICE O/P EST MOD 30 MIN: CPT | Performed by: NURSE PRACTITIONER

## 2020-02-17 RX ORDER — ERYTHROMYCIN 5 MG/G
0.5 OINTMENT OPHTHALMIC 3 TIMES DAILY
Qty: 3.5 G | Refills: 0 | Status: SHIPPED | OUTPATIENT
Start: 2020-02-17 | End: 2020-07-08

## 2020-02-17 RX ORDER — CLOBETASOL PROPIONATE 0.5 MG/G
OINTMENT TOPICAL
Qty: 45 G | Refills: 1 | Status: SHIPPED | OUTPATIENT
Start: 2020-02-17 | End: 2021-06-02

## 2020-02-17 RX ORDER — FLUCONAZOLE 150 MG/1
150 TABLET ORAL ONCE
Qty: 1 TABLET | Refills: 0 | Status: SHIPPED | OUTPATIENT
Start: 2020-02-17 | End: 2020-03-18

## 2020-02-17 ASSESSMENT — ANXIETY QUESTIONNAIRES
7. FEELING AFRAID AS IF SOMETHING AWFUL MIGHT HAPPEN: NOT AT ALL
4. TROUBLE RELAXING: NOT AT ALL
1. FEELING NERVOUS, ANXIOUS, OR ON EDGE: NOT AT ALL
GAD7 TOTAL SCORE: 0
6. BECOMING EASILY ANNOYED OR IRRITABLE: NOT AT ALL
3. WORRYING TOO MUCH ABOUT DIFFERENT THINGS: NOT AT ALL
7. FEELING AFRAID AS IF SOMETHING AWFUL MIGHT HAPPEN: NOT AT ALL
5. BEING SO RESTLESS THAT IT IS HARD TO SIT STILL: NOT AT ALL
2. NOT BEING ABLE TO STOP OR CONTROL WORRYING: NOT AT ALL
GAD7 TOTAL SCORE: 0
GAD7 TOTAL SCORE: 0

## 2020-02-17 ASSESSMENT — MIFFLIN-ST. JEOR: SCORE: 1314.67

## 2020-02-17 NOTE — PATIENT INSTRUCTIONS
Take Augmentin with food.  Please call or return to clinic for any questions, concerns, or symptoms that are not improving or becoming worse.    Tanya Cedeno, CNP    Patient Education     Sinusitis (Antibiotic Treatment)    The sinuses are air-filled spaces within the bones of the face. They connect to the inside of the nose. Sinusitis is an inflammation of the tissue that lines the sinuses. Sinusitis can occur during a cold. It can also happen due to allergies to pollens and other particles in the air. Sinusitis can cause symptoms of sinus congestion and a feeling of fullness. A sinus infection causes fever, headache, and facial pain. There is often green or yellow fluid draining from the nose or into the back of the throat (post-nasal drip). You have been given antibiotics to treat this condition.  Home care    Take the full course of antibiotics as instructed. Do not stop taking them, even when you feel better.    Drink plenty of water, hot tea, and other liquids. This may help thin nasal mucus. It also may help your sinuses drain fluids.    Heat may help soothe painful areas of your face. Use a towel soaked in hot water. Or,  the shower and direct the warm spray onto your face. Using a vaporizer along with a menthol rub at night may also help soothe symptoms.     An expectorant with guaifenesin may help thin nasal mucus and help your sinuses drain fluids.    You can use an over-the-counter decongestant, unless a similar medicine was prescribed to you. Nasal sprays work the fastest. Use one that contains phenylephrine or oxymetazoline. First blow your nose gently. Then use the spray. Do not use these medicines more often than directed on the label. If you do, your symptoms may get worse. You may also take pills that contain pseudoephedrine. Don t use products that combine multiple medicines. This is because side effects may be increased. Read labels. You can also ask the pharmacist for help. (People  with high blood pressure should not use decongestants. They can raise blood pressure.)    Over-the-counter antihistamines may help if allergies contributed to your sinusitis.      Do not use nasal rinses or irrigation during an acute sinus infection, unless your healthcare provider tells you to. Rinsing may spread the infection to other areas in your sinuses.    Use acetaminophen or ibuprofen to control pain, unless another pain medicine was prescribed to you. If you have chronic liver or kidney disease or ever had a stomach ulcer, talk with your healthcare provider before using these medicines. (Aspirin should never be taken by anyone under age 18 who is ill with a fever. It may cause severe liver damage.)    Don't smoke. This can make symptoms worse.  Follow-up care  Follow up with your healthcare provider or our staff if you are not better in 1 week.  When to seek medical advice  Call your healthcare provider if any of these occur:    Facial pain or headache that gets worse    Stiff neck    Unusual drowsiness or confusion    Swelling of your forehead or eyelids    Vision problems, such as blurred or double vision    Fever of 100.4 F (38 C) or higher, or as directed by your healthcare provider    Seizure    Breathing problems    Symptoms don't go away in 10 days  Prevention  Here are steps you can take to help prevent an infection:    Keep good hand washing habits.    Don t have close contact with people who have sore throats, colds, or other upper respiratory infections.    Don t smoke, and stay away from secondhand smoke.    Stay up to date with of your vaccines.  Date Last Reviewed: 11/1/2017 2000-2019 [employer]. [address] [place] [address], [place] [id number]. All rights reserved. This information is not intended as a substitute for professional medical care. Always follow your healthcare professional's instructions.           Patient Education     Sty (or Stye)  A sty is an infection of the oil  gland of the eyelid. It may develop into a small pocket of pus (an abscess). This can cause pain, redness, and swelling. In early stages, a sty is treated with antibiotic cream, eye drops, or a small towel soaked in warm water (a warm compress). More severe cases may need to be opened and drained by a healthcare provider.  Home care    Eye drops or ointment are usually prescribed to treat the infection. Use these as directed.     Artificial tears may also be used to lubricate the eye and make it more comfortable. You can buy these over the counter without a prescription. Talk with your healthcare provider before using any over-the-counter treatment for a sty.    Apply a warm, damp towel to the affected eye for at least 5 minutes, 3 to 4 times a day for a week. Warm compresses open the pores and speed the healing. But if the compresses are too hot, they may burn your eyelid.    Sometimes the sty will drain with this treatment alone. If this happens, keep using the antibiotic until all the redness and swelling are gone.    Wash your hands before and after touching the infected eyelid to avoid spreading the infection.    Don t squeeze or try to break open the sty.  Follow-up care  Follow up with your healthcare provider, or as advised.   When to seek medical advice  Call your healthcare provider right away if any of these occur:    Increase in swelling or redness around the eyelid after 48 to 72 hours    Increase in eye pain or the eyelid blisters    Increase in warmth--the eyelid feels hot    Drainage of blood or thick pus from the sty    Blister on the eyelid    Inability to open the eyelid due to swelling    Fever of 100.4 F (38 C) or above, or as directed by your provider    Vision changes    Headache or stiff neck    The sty comes back  Date Last Reviewed: 8/1/2017 2000-2019 [employer]. [address] [place] [address], [place] [address]. All rights reserved. This information is not intended as a  substitute for professional medical care. Always follow your healthcare professional's instructions.

## 2020-02-17 NOTE — PROGRESS NOTES
Subjective     Gabriella Jeffries is a 54 year old female who presents to clinic today for the following health issues:    HPI   Acute Illness   Acute illness concerns: sinus  Onset: 1-1.5 weeks    Fever: no     Chills/Sweats: no     Headache (location?): YES- left side and frontal    Sinus Pressure:YES- tender, post-nasal drainage, facial pain and teeth pain    Conjunctivitis:  YES: left    Ear Pain: YES: both    Rhinorrhea: YES    Congestion: no     Sore Throat: no      Cough: no    Wheeze: no     Decreased Appetite: YES    Nausea: no     Vomiting: no     Diarrhea:  no     Dysuria/Freq.: no     Fatigue/Achiness: YES    Sick/Strep Exposure: YES     Therapies Tried and outcome: ibuprofen    Eye(s) Problem  Onset: 2/15/20    Description:   Location: left  Pain: YES  Redness: YES    Accompanying Signs & Symptoms:  Discharge/mattering: YES- in morning  Swelling: YES  Visual changes: no   Fever: no   Nasal Congestion: YES  Bothered by bright lights: no     History:   Trauma: no   Foreign body exposure: no     Precipitating factors:   Wearing contacts: no     Alleviating factors:  Improved by: none    Therapies Tried and outcome: warm compress      Patient Active Problem List   Diagnosis     Psoriasis     CARDIOVASCULAR SCREENING; LDL GOAL LESS THAN 160     Abdominal pain     Vaginal odor     Incontinence     IBS (irritable colon syndrome)     Abnormal LFTs     Disorder of bursae and tendons in shoulder region     Left shoulder pain     Esophageal reflux     H/O: hysterectomy     Left elbow pain     Lateral epicondylitis, unspecified laterality     Elevated blood pressure reading without diagnosis of hypertension     Mild major depression (H)     DIANA (generalized anxiety disorder)     Past Surgical History:   Procedure Laterality Date     ABDOMEN SURGERY  2016    lysis of adhesions for bowel obstruction     C/SECTION, LOW TRANSVERSE      , Low Transverse     HYSTERECTOMY, VAGINAL  2006    has ovaries, due to  bleeding     SHOULDER SURGERY Right 10/3/14    SAD/DCE     SURGICAL HISTORY OF -   2006    rectocele and TVT     SURGICAL HISTORY OF -       left lumpectomy       Social History     Tobacco Use     Smoking status: Never Smoker     Smokeless tobacco: Never Used   Substance Use Topics     Alcohol use: Yes     Comment: Social / 1 or 2 per week      Family History   Problem Relation Age of Onset     Heart Disease Mother         defibrillator     Cardiovascular Mother         defibrillator     Cardiovascular Father         valve replacement     Hypertension Father      Eye Disorder Father         maccular degeneration     Heart Disease Father         valve replacement     Lipids Father      Genitourinary Problems Brother         CKD     Cancer Brother         leukemia     Asthma No family hx of      C.A.D. No family hx of      Diabetes No family hx of      Cerebrovascular Disease No family hx of      Breast Cancer No family hx of      Cancer - colorectal No family hx of      Prostate Cancer No family hx of      Alzheimer Disease No family hx of      Arthritis No family hx of      Blood Disease No family hx of      Circulatory No family hx of      Gastrointestinal Disease No family hx of      Musculoskeletal Disorder No family hx of      Neurologic Disorder No family hx of      Respiratory No family hx of      Thyroid Disease No family hx of      Alcohol/Drug No family hx of      Allergies No family hx of      Anesthesia Reaction No family hx of      Congenital Anomalies No family hx of      Connective Tissue Disorder No family hx of      Other Cancer No family hx of      Depression No family hx of      Anxiety Disorder No family hx of      Mental Illness No family hx of      Substance Abuse No family hx of      Osteoporosis No family hx of      Genetic Disorder No family hx of      Obesity No family hx of          Current Outpatient Medications   Medication Sig Dispense Refill     citalopram (CELEXA) 10 MG tablet Take 1  "tablet (10 mg) by mouth daily 30 tablet 1     clobetasol (TEMOVATE) 0.05 % ointment Apply  topically 2 times daily as needed. Apply sparingly to affected area.  Do not apply to face. 45 g 1     meclizine (ANTIVERT) 25 MG tablet Take 1 tablet (25 mg) by mouth 3 times daily as needed for dizziness (Patient not taking: Reported on 2/17/2020) 30 tablet 0     Allergies   Allergen Reactions     No Known Drug Allergy      Recent Labs   Lab Test 11/15/19  1708 04/25/17  0850 03/02/16 02/16/16  1441 11/21/12  1541 05/04/12  1016  12/08/11  0941   * 100*  --  126*  --   --   --   --    HDL 66 50  --  46*  --   --   --   --    TRIG 96 72  --  156*  --   --   --   --    ALT 40  --   --   --   --  13  --  115*   CR 0.69  --  0.71  --   --   --   --  0.75   GFRESTIMATED >90  --   --   --   --   --   --  84   GFRESTBLACK >90  --   --   --   --   --   --  >90   POTASSIUM 3.7  --  3.9  --   --   --   --  4.5   TSH  --  1.03  --   --  1.15 1.08   < >  --     < > = values in this interval not displayed.      BP Readings from Last 3 Encounters:   02/17/20 130/80   11/15/19 122/70   11/04/19 (!) 152/90    Wt Readings from Last 3 Encounters:   02/17/20 75.2 kg (165 lb 11.2 oz)   11/15/19 73.3 kg (161 lb 9.6 oz)   11/04/19 74.3 kg (163 lb 14.4 oz)                    Reviewed and updated as needed this visit by Provider         Review of Systems   ROS COMP: Constitutional, HEENT, cardiovascular, pulmonary, gi and gu systems are negative, except as otherwise noted.      Objective    /80   Pulse 74   Temp 98.9  F (37.2  C) (Temporal)   Resp 16   Ht 1.582 m (5' 2.3\")   Wt 75.2 kg (165 lb 11.2 oz)   LMP  (LMP Unknown)   SpO2 96%   BMI 30.01 kg/m    Body mass index is 30.01 kg/m .  Physical Exam   GENERAL: healthy, alert and no distress  EYES: Eyes grossly normal to inspection, PERRL, EOMI, visual fields normal and eyelids- hordeolum/sty OS  HENT: normal cephalic/atraumatic, ear canals and TM's normal, nose and mouth " without ulcers or lesions, oropharynx clear, oral mucous membranes moist and sinuses: maxillary, frontal tenderness on bilaterally  NECK: bilateral anterior cervical adenopathy, no asymmetry, masses, or scars and thyroid normal to palpation  RESP: lungs clear to auscultation - no rales, rhonchi or wheezes  CV: regular rate and rhythm, normal S1 S2, no S3 or S4, no murmur, click or rub, no peripheral edema and peripheral pulses strong  ABDOMEN: soft, nontender, no hepatosplenomegaly, no masses and bowel sounds normal  MS: no gross musculoskeletal defects noted, no edema  SKIN: no suspicious lesions or rashes  NEURO: Normal strength and tone, mentation intact and speech normal  PSYCH: mentation appears normal, affect normal/bright    Diagnostic Test Results:  Labs reviewed in Epic  none         Assessment & Plan     1. Acute non-recurrent frontal sinusitis  Prescribed the following for sinus infection.  Home care measures discussed in detail and these were included in her AVS.  Advised to follow-up for symptoms that are not improving or becoming worse.  Patient voiced understanding and agreement with treatment plan.   - amoxicillin-clavulanate (AUGMENTIN) 875-125 MG tablet; Take 1 tablet by mouth 2 times daily for 10 days  Dispense: 20 tablet; Refill: 0    2. Hordeolum externum of left lower eyelid  Prescribed the following.  Home care measures discussed in detail and these were included in her AVS. Advised to follow-up for symptoms that are not improving or becoming worse.  Patient voiced understanding and agreement with treatment plan.   - erythromycin (ROMYCIN) 5 MG/GM ophthalmic ointment; Place 0.5 inches Into the left eye 3 times daily  Dispense: 3.5 g; Refill: 0    3. Psoriasis  Controlled.  No concerns today.  Requested a refill today.  - clobetasol (TEMOVATE) 0.05 % external ointment; Apply  topically 2 times daily as needed. Apply sparingly to affected area.  Do not apply to face.  Dispense: 45 g; Refill:  1    4. Antibiotic-induced yeast infection  Prescribed the following as Gabriella states she normally gets a yeast infection with antibiotic use.    - fluconazole (DIFLUCAN) 150 MG tablet; Take 1 tablet (150 mg) by mouth once for 1 dose  Dispense: 1 tablet; Refill: 0    5.  Need for influenza vaccination  Gabriella declines influenza vaccine due to safety/side effects.    Patient Instructions   Take Augmentin with food.  Please call or return to clinic for any questions, concerns, or symptoms that are not improving or becoming worse.    Tanya Cedeno, CNP    Patient Education     Sinusitis (Antibiotic Treatment)    The sinuses are air-filled spaces within the bones of the face. They connect to the inside of the nose. Sinusitis is an inflammation of the tissue that lines the sinuses. Sinusitis can occur during a cold. It can also happen due to allergies to pollens and other particles in the air. Sinusitis can cause symptoms of sinus congestion and a feeling of fullness. A sinus infection causes fever, headache, and facial pain. There is often green or yellow fluid draining from the nose or into the back of the throat (post-nasal drip). You have been given antibiotics to treat this condition.  Home care    Take the full course of antibiotics as instructed. Do not stop taking them, even when you feel better.    Drink plenty of water, hot tea, and other liquids. This may help thin nasal mucus. It also may help your sinuses drain fluids.    Heat may help soothe painful areas of your face. Use a towel soaked in hot water. Or,  the shower and direct the warm spray onto your face. Using a vaporizer along with a menthol rub at night may also help soothe symptoms.     An expectorant with guaifenesin may help thin nasal mucus and help your sinuses drain fluids.    You can use an over-the-counter decongestant, unless a similar medicine was prescribed to you. Nasal sprays work the fastest. Use one that contains phenylephrine  or oxymetazoline. First blow your nose gently. Then use the spray. Do not use these medicines more often than directed on the label. If you do, your symptoms may get worse. You may also take pills that contain pseudoephedrine. Don t use products that combine multiple medicines. This is because side effects may be increased. Read labels. You can also ask the pharmacist for help. (People with high blood pressure should not use decongestants. They can raise blood pressure.)    Over-the-counter antihistamines may help if allergies contributed to your sinusitis.      Do not use nasal rinses or irrigation during an acute sinus infection, unless your healthcare provider tells you to. Rinsing may spread the infection to other areas in your sinuses.    Use acetaminophen or ibuprofen to control pain, unless another pain medicine was prescribed to you. If you have chronic liver or kidney disease or ever had a stomach ulcer, talk with your healthcare provider before using these medicines. (Aspirin should never be taken by anyone under age 18 who is ill with a fever. It may cause severe liver damage.)    Don't smoke. This can make symptoms worse.  Follow-up care  Follow up with your healthcare provider or our staff if you are not better in 1 week.  When to seek medical advice  Call your healthcare provider if any of these occur:    Facial pain or headache that gets worse    Stiff neck    Unusual drowsiness or confusion    Swelling of your forehead or eyelids    Vision problems, such as blurred or double vision    Fever of 100.4 F (38 C) or higher, or as directed by your healthcare provider    Seizure    Breathing problems    Symptoms don't go away in 10 days  Prevention  Here are steps you can take to help prevent an infection:    Keep good hand washing habits.    Don t have close contact with people who have sore throats, colds, or other upper respiratory infections.    Don t smoke, and stay away from secondhand smoke.    Stay  up to date with of your vaccines.  Date Last Reviewed: 11/1/2017 2000-2019 [employer]. [address] [place] [address], [place] [address]. All rights reserved. This information is not intended as a substitute for professional medical care. Always follow your healthcare professional's instructions.           Patient Education     Sty (or Stye)  A sty is an infection of the oil gland of the eyelid. It may develop into a small pocket of pus (an abscess). This can cause pain, redness, and swelling. In early stages, a sty is treated with antibiotic cream, eye drops, or a small towel soaked in warm water (a warm compress). More severe cases may need to be opened and drained by a healthcare provider.  Home care    Eye drops or ointment are usually prescribed to treat the infection. Use these as directed.     Artificial tears may also be used to lubricate the eye and make it more comfortable. You can buy these over the counter without a prescription. Talk with your healthcare provider before using any over-the-counter treatment for a sty.    Apply a warm, damp towel to the affected eye for at least 5 minutes, 3 to 4 times a day for a week. Warm compresses open the pores and speed the healing. But if the compresses are too hot, they may burn your eyelid.    Sometimes the sty will drain with this treatment alone. If this happens, keep using the antibiotic until all the redness and swelling are gone.    Wash your hands before and after touching the infected eyelid to avoid spreading the infection.    Don t squeeze or try to break open the sty.  Follow-up care  Follow up with your healthcare provider, or as advised.   When to seek medical advice  Call your healthcare provider right away if any of these occur:    Increase in swelling or redness around the eyelid after 48 to 72 hours    Increase in eye pain or the eyelid blisters    Increase in warmth--the eyelid feels hot    Drainage of blood or thick pus from the  sty    Blister on the eyelid    Inability to open the eyelid due to swelling    Fever of 100.4 F (38 C) or above, or as directed by your provider    Vision changes    Headache or stiff neck    The sty comes back  Date Last Reviewed: 8/1/2017 2000-2019 [employer]. [address] [place] [id number]. All rights reserved. This information is not intended as a substitute for professional medical care. Always follow your healthcare professional's instructions.               Return in about 3 days (around 2/20/2020).    Tanya Cedeno, [facility] DEE    Answers for HPI/ROS submitted by the patient on 2/17/2020   If you checked off any problems, how difficult have these problems made it for you to do your work, take care of things at home, or get along with other people?: Not difficult at all  PHQ9 TOTAL SCORE: 3  DIANA 7 TOTAL SCORE: 0

## 2020-02-18 ASSESSMENT — ANXIETY QUESTIONNAIRES: GAD7 TOTAL SCORE: 0

## 2020-03-05 ASSESSMENT — PATIENT HEALTH QUESTIONNAIRE - PHQ9: SUM OF ALL RESPONSES TO PHQ QUESTIONS 1-9: 3

## 2020-03-18 ENCOUNTER — MYC MEDICAL ADVICE (OUTPATIENT)
Dept: FAMILY MEDICINE | Facility: CLINIC | Age: 55
End: 2020-03-18

## 2020-03-18 ENCOUNTER — VIRTUAL VISIT (OUTPATIENT)
Dept: FAMILY MEDICINE | Facility: CLINIC | Age: 55
End: 2020-03-18
Payer: COMMERCIAL

## 2020-03-18 DIAGNOSIS — R19.7 DIARRHEA OF PRESUMED INFECTIOUS ORIGIN: ICD-10-CM

## 2020-03-18 DIAGNOSIS — R19.7 DIARRHEA OF PRESUMED INFECTIOUS ORIGIN: Primary | ICD-10-CM

## 2020-03-18 PROCEDURE — 80048 BASIC METABOLIC PNL TOTAL CA: CPT | Performed by: FAMILY MEDICINE

## 2020-03-18 PROCEDURE — 99442 ZZC PHYSICIAN TELEPHONE EVALUATION 11-20 MIN: CPT | Performed by: FAMILY MEDICINE

## 2020-03-18 PROCEDURE — 36415 COLL VENOUS BLD VENIPUNCTURE: CPT | Performed by: FAMILY MEDICINE

## 2020-03-18 RX ORDER — ONDANSETRON 4 MG/1
4 TABLET, ORALLY DISINTEGRATING ORAL EVERY 8 HOURS PRN
Qty: 15 TABLET | Refills: 0 | Status: SHIPPED | OUTPATIENT
Start: 2020-03-18 | End: 2020-07-08

## 2020-03-18 NOTE — TELEPHONE ENCOUNTER
Please schedule phone visit with a provider.    Jasen Cedeno MD, FAAFP  Family Medicine Physician  [facility] [place]  [address] [facility] [place] [address]

## 2020-03-18 NOTE — PROGRESS NOTES
Gabriella Jeffries is a 54 year old female who is being evaluated via a billable telephone visit.      The patient has been notified of following:     Gabriella Jeffries complains of    Chief Complaint   Patient presents with     Nausea     Dizziness     Diarrhea     I have reviewed and updated the patient's Past Medical History, Social History, Family History and Medication List.    Patient reports onset of both watery and loose stool diarrhea 3-4 times daily that is nonbloody for the last approximately 2 to 3 weeks.  She does have significant urgency and causes issues when she is on the phone at work.  She does have some lightheadedness symptoms as well but these are tolerable.  She has had mild emesis last Friday that was nonbloody but this is since resolved.  She continues to have daily nausea as well as diarrhea symptoms listed above.  She does have recent antibiotic use at the middle to end of February with Augmentin to be treated for a sinus infection.  She has no recent travel.  No other sick contacts.    Her sinus symptoms have resolved and she has no fever, chills, cough, rhinorrhea, nasal congestion, rash, or other symptoms at this time.    She has no known drug allergies.    She does have significant history of a bowel obstruction in 2016 requiring surgery per patient.  She previously had diarrhea for 1 to 2 days before constipation causing her symptoms.    She has mild abdominal pain; however, this is much less than previous symptoms and again she reports having stools.    ALLERGIES   No known drug allergy    Kerri Hauser MA    (MA signature)    Assessment/Plan:  1. Diarrhea of presumed infectious origin: Patient reports that she is not currently having respiratory symptoms and these resolved number weeks ago.  She is safe to come in for further evaluation with a lab only appointment to have a BMP as well as stool samples checked.  Specifically I am concerned for C. difficile following antibiotic use.  Will  also check culture, ova and parasites given duration of symptoms.  Will treat symptomatically and recommended brat diet and Zofran for nausea.  Recommended good hydration rest as well as good hand hygiene.  Patient agreeable with this plan.  Discussed red flag symptoms when to report to the emergency department including worsening sudden onset of abdominal pain, blood in the stools, constipation.  She understands these all may be in relation to a small bowel obstruction.  Will call with results unavailable.  - ondansetron (ZOFRAN-ODT) 4 MG ODT tab; Take 1 tablet (4 mg) by mouth every 8 hours as needed for nausea  Dispense: 15 tablet; Refill: 0  - Clostridium difficile Toxin B PCR; Future  - Ova and Parasite Exam Routine; Future  - Basic metabolic panel; Future  - Enteric Bacteria and Virus Panel by NADINE Stool; Future    I have reviewed the note as documented above.  This accurately captures the substance of my conversation with the patient.    Phone call contact time  Call Started at 11:56 am  Call Ended at 12:07 pm    José Miguel Milian MD  [facility]    This chart is completed utilizing dictation software; typos and/or incorrect word substitutions may unintentionally occur.

## 2020-03-19 LAB
ANION GAP SERPL CALCULATED.3IONS-SCNC: 10 MMOL/L (ref 3–14)
BUN SERPL-MCNC: 12 MG/DL (ref 7–30)
CALCIUM SERPL-MCNC: 8.8 MG/DL (ref 8.5–10.1)
CHLORIDE SERPL-SCNC: 107 MMOL/L (ref 94–109)
CO2 SERPL-SCNC: 21 MMOL/L (ref 20–32)
CREAT SERPL-MCNC: 0.56 MG/DL (ref 0.52–1.04)
GFR SERPL CREATININE-BSD FRML MDRD: >90 ML/MIN/{1.73_M2}
GLUCOSE SERPL-MCNC: 94 MG/DL (ref 70–99)
POTASSIUM SERPL-SCNC: 3.9 MMOL/L (ref 3.4–5.3)
SODIUM SERPL-SCNC: 138 MMOL/L (ref 133–144)

## 2020-03-20 DIAGNOSIS — R19.7 DIARRHEA OF PRESUMED INFECTIOUS ORIGIN: ICD-10-CM

## 2020-03-20 LAB
C COLI+JEJUNI+LARI FUSA STL QL NAA+PROBE: NOT DETECTED
C DIFF TOX B STL QL: NORMAL
EC STX1 GENE STL QL NAA+PROBE: NOT DETECTED
EC STX2 GENE STL QL NAA+PROBE: NOT DETECTED
ENTERIC PATHOGEN COMMENT: NORMAL
NOROV GI+II ORF1-ORF2 JNC STL QL NAA+PR: NOT DETECTED
RVA NSP5 STL QL NAA+PROBE: NOT DETECTED
SALMONELLA SP RPOD STL QL NAA+PROBE: NOT DETECTED
SHIGELLA SP+EIEC IPAH STL QL NAA+PROBE: NOT DETECTED
SPECIMEN SOURCE: NORMAL
V CHOL+PARA RFBL+TRKH+TNAA STL QL NAA+PR: NOT DETECTED
Y ENTERO RECN STL QL NAA+PROBE: NOT DETECTED

## 2020-03-20 PROCEDURE — 87209 SMEAR COMPLEX STAIN: CPT | Performed by: FAMILY MEDICINE

## 2020-03-20 PROCEDURE — 87177 OVA AND PARASITES SMEARS: CPT | Performed by: FAMILY MEDICINE

## 2020-03-20 PROCEDURE — 87506 IADNA-DNA/RNA PROBE TQ 6-11: CPT | Performed by: FAMILY MEDICINE

## 2020-03-21 DIAGNOSIS — F41.1 GAD (GENERALIZED ANXIETY DISORDER): ICD-10-CM

## 2020-03-21 DIAGNOSIS — F32.0 MILD MAJOR DEPRESSION (H): ICD-10-CM

## 2020-03-23 ENCOUNTER — TELEPHONE (OUTPATIENT)
Dept: FAMILY MEDICINE | Facility: CLINIC | Age: 55
End: 2020-03-23

## 2020-03-23 LAB
O+P STL MICRO: NORMAL
O+P STL MICRO: NORMAL
SPECIMEN SOURCE: NORMAL

## 2020-03-23 RX ORDER — CITALOPRAM HYDROBROMIDE 10 MG/1
TABLET ORAL
Qty: 30 TABLET | Refills: 1 | Status: SHIPPED | OUTPATIENT
Start: 2020-03-23 | End: 2020-05-22

## 2020-03-23 NOTE — TELEPHONE ENCOUNTER
Left detailed message informing patient.   Kerri Hauser MA           Your stool tests so far were normal. Please let me know if your symptoms have not improved.     Please call the clinic with any questions you may have.     Have a great day,     Dr. Cooley

## 2020-04-03 ENCOUNTER — MYC MEDICAL ADVICE (OUTPATIENT)
Dept: FAMILY MEDICINE | Facility: CLINIC | Age: 55
End: 2020-04-03

## 2020-05-22 DIAGNOSIS — F41.1 GAD (GENERALIZED ANXIETY DISORDER): ICD-10-CM

## 2020-05-22 DIAGNOSIS — F32.0 MILD MAJOR DEPRESSION (H): ICD-10-CM

## 2020-05-22 NOTE — TELEPHONE ENCOUNTER
Pending Prescriptions:                       Disp   Refills    citalopram (CELEXA) 10 MG tablet          30 tab*1            Sig: Take 1 tablet (10 mg) by mouth daily    Delores Sanders CMA

## 2020-05-26 RX ORDER — CITALOPRAM HYDROBROMIDE 10 MG/1
10 TABLET ORAL DAILY
Qty: 90 TABLET | Refills: 0 | Status: SHIPPED | OUTPATIENT
Start: 2020-05-26 | End: 2020-07-01

## 2020-05-26 NOTE — TELEPHONE ENCOUNTER
Pending Prescriptions:                       Disp   Refills    citalopram (CELEXA) 10 MG tablet          30 tab*1            Sig: Take 1 tablet (10 mg) by mouth daily    PHQ 11/4/2019 2/17/2020 4/15/2020   PHQ-9 Total Score 4 3 2   Q9: Thoughts of better off dead/self-harm past 2 weeks Not at all Not at all Not at all     Prescription approved per [facility] Refill Protocol.    Isidra Koroma, MSN, RN

## 2020-06-04 ENCOUNTER — TELEPHONE (OUTPATIENT)
Dept: FAMILY MEDICINE | Facility: CLINIC | Age: 55
End: 2020-06-04

## 2020-06-04 NOTE — TELEPHONE ENCOUNTER
Reason for call:  Form  Reason for Call:  Form, our goal is to have forms completed with 72 hours, however, some forms may require a visit or additional information.    Type of letter, form or note:  Medical    Who is the form from?:  [employer]     Where did the form come from: form was faxed in    What clinic location was the form placed at?: [facility] - [phone]    Where the form was placed: 's in box     What number is listed as a contact on the form?: none       Additional comments: Fax back to [phone]    Call taken on 6/4/2020 at 1:07 PM by Abdoul Wen

## 2020-06-05 NOTE — TELEPHONE ENCOUNTER
Received FMLA forms. Please gather more information around how often appointments are for her daughter and how often she anticipates needing off work.  Does she often need half days or full days?

## 2020-06-05 NOTE — TELEPHONE ENCOUNTER
Called and spoke to Gabriella, they have an appointment once a week. She stated probably needing of work for at least once a week for 4-8 hours per day, depending on the day, she stated.     Jovi Granger,

## 2020-06-29 ENCOUNTER — TELEPHONE (OUTPATIENT)
Dept: FAMILY MEDICINE | Facility: CLINIC | Age: 55
End: 2020-06-29

## 2020-06-29 DIAGNOSIS — F32.0 MILD MAJOR DEPRESSION (H): ICD-10-CM

## 2020-06-29 DIAGNOSIS — F41.1 GAD (GENERALIZED ANXIETY DISORDER): ICD-10-CM

## 2020-06-30 RX ORDER — CITALOPRAM HYDROBROMIDE 20 MG/1
TABLET ORAL
Qty: 30 TABLET | Refills: 0 | OUTPATIENT
Start: 2020-06-30

## 2020-06-30 NOTE — TELEPHONE ENCOUNTER
Pending Prescriptions:                       Disp   Refills    citalopram (CELEXA) 20 MG tablet [Pharmacy*30 tab*0        Sig: TAKE 1 TABLET(20 MG) BY MOUTH DAILY      Routing refill request to provider for review/approval because:  Dose    Isidra Koroma, MSN, RN

## 2020-06-30 NOTE — TELEPHONE ENCOUNTER
Please verify with patient whether on 10 mg of celexa or 20 mg daily.     Please repeat PHQ9 and DIANA 7

## 2020-07-01 RX ORDER — CITALOPRAM HYDROBROMIDE 10 MG/1
10 TABLET ORAL DAILY
Qty: 30 TABLET | Refills: 0 | Status: SHIPPED | OUTPATIENT
Start: 2020-07-01 | End: 2020-07-08

## 2020-07-01 ASSESSMENT — ANXIETY QUESTIONNAIRES
6. BECOMING EASILY ANNOYED OR IRRITABLE: NOT AT ALL
3. WORRYING TOO MUCH ABOUT DIFFERENT THINGS: SEVERAL DAYS
IF YOU CHECKED OFF ANY PROBLEMS ON THIS QUESTIONNAIRE, HOW DIFFICULT HAVE THESE PROBLEMS MADE IT FOR YOU TO DO YOUR WORK, TAKE CARE OF THINGS AT HOME, OR GET ALONG WITH OTHER PEOPLE: SOMEWHAT DIFFICULT
7. FEELING AFRAID AS IF SOMETHING AWFUL MIGHT HAPPEN: NOT AT ALL
1. FEELING NERVOUS, ANXIOUS, OR ON EDGE: SEVERAL DAYS
GAD7 TOTAL SCORE: 4
2. NOT BEING ABLE TO STOP OR CONTROL WORRYING: SEVERAL DAYS
5. BEING SO RESTLESS THAT IT IS HARD TO SIT STILL: NOT AT ALL

## 2020-07-01 ASSESSMENT — PATIENT HEALTH QUESTIONNAIRE - PHQ9
5. POOR APPETITE OR OVEREATING: SEVERAL DAYS
SUM OF ALL RESPONSES TO PHQ QUESTIONS 1-9: 3

## 2020-07-01 NOTE — TELEPHONE ENCOUNTER
"Pt completed the two questionnaires on the phone.  She stated she is taking celexa 10 mg but is wondering if Dr. Erika Delgado would be willing to \"bump it up a little bit?\"  "

## 2020-07-01 NOTE — TELEPHONE ENCOUNTER
Patient called clinic back and scheduled appt for 7/8 for phone visit. She needs more Celexa medication sent to her pharmacy. She only has 3 left.

## 2020-07-01 NOTE — TELEPHONE ENCOUNTER
Based on the answers to the questionnaires, I'm not sure if an increase would be beneficial.  Appears to be good control based on these. I think it would be good to have a visit, can be virtual, to discuss and see if that would be helpful or if something else should be done.     Let me know if she needs a refill until she can be scheduled.

## 2020-07-02 ASSESSMENT — ANXIETY QUESTIONNAIRES: GAD7 TOTAL SCORE: 4

## 2020-07-03 NOTE — PROGRESS NOTES
"Gabriella Jeffries is a 54 year old female who is being evaluated via a billable telephone visit.      The patient has been notified of following:     \"This telephone visit will be conducted via a call between you and your physician/provider. We have found that certain health care needs can be provided without the need for a physical exam.  This service lets us provide the care you need with a short phone conversation.  If a prescription is necessary we can send it directly to your pharmacy.  If lab work is needed we can place an order for that and you can then stop by our lab to have the test done at a later time.    Telephone visits are billed at different rates depending on your insurance coverage. During this emergency period, for some insurers they may be billed the same as an in-person visit.  Please reach out to your insurance provider with any questions.    If during the course of the call the physician/provider feels a telephone visit is not appropriate, you will not be charged for this service.\"    Patient has given verbal consent for Telephone visit?  Yes    What phone number would you like to be contacted at? [phone]    How would you like to obtain your AVS? Migdaliahart    Subjective     Gabriella Jeffries is a 54 year old female who presents via phone visit today for the following health issues:    HPI  Depression and Anxiety Follow-Up    How are you doing with your depression since your last visit? Improved     How are you doing with your anxiety since your last visit?  Improved     Are you having other symptoms that might be associated with depression or anxiety? No    Have you had a significant life event? No     Do you have any concerns with your use of alcohol or other drugs? No     Per Doris she had been out of her citalopram for about a week.   Feels like her anxiety is situational with her daughter.   Side effects - weight gain.     Has done family counseling and seeing her EAP      Social History "     Tobacco Use     Smoking status: Never Smoker     Smokeless tobacco: Never Used   Substance Use Topics     Alcohol use: Yes     Comment: Social / 1 or 2 per week      Drug use: No     PHQ 4/15/2020 2020 2020   PHQ-9 Total Score 2 3 2   Q9: Thoughts of better off dead/self-harm past 2 weeks Not at all Not at all Not at all     DIANA-7 SCORE 4/15/2020 2020 2020   Total Score 5 (mild anxiety) - -   Total Score 5 4 0         Suicide Assessment Five-step Evaluation and Treatment (SAFE-T)    FOOT INJURY - LEFT  Happened over the   Injured getting into higher tub shower. Jammed into the tub. Swollen.   Hard time moving 3 middle toes.   Looks bruised over toes and up the foot.   Hurts around the side of the foot.   Has tingling off and on. Can walk on it.   Has not been putting shoes on except crocs.                  Patient Active Problem List   Diagnosis     Psoriasis     CARDIOVASCULAR SCREENING; LDL GOAL LESS THAN 160     Abdominal pain     Vaginal odor     Incontinence     IBS (irritable colon syndrome)     Abnormal LFTs     Disorder of bursae and tendons in shoulder region     Left shoulder pain     Esophageal reflux     H/O: hysterectomy     Left elbow pain     Lateral epicondylitis, unspecified laterality     Elevated blood pressure reading without diagnosis of hypertension     DIANA (generalized anxiety disorder)     Depressive disorder in remission     Past Surgical History:   Procedure Laterality Date     ABDOMEN SURGERY  2016    lysis of adhesions for bowel obstruction     C/SECTION, LOW TRANSVERSE      , Low Transverse     HYSTERECTOMY, VAGINAL  2006    has ovaries, due to bleeding     SHOULDER SURGERY Right 10/3/14    SAD/DCE     SURGICAL HISTORY OF -   2006    rectocele and TVT     SURGICAL HISTORY OF -       left lumpectomy       Social History     Tobacco Use     Smoking status: Never Smoker     Smokeless tobacco: Never Used   Substance Use Topics     Alcohol use:  Yes     Comment: Social / 1 or 2 per week      Family History   Problem Relation Age of Onset     Heart Disease Mother         defibrillator     Cardiovascular Mother         defibrillator     Cardiovascular Father         valve replacement     Hypertension Father      Eye Disorder Father         maccular degeneration     Heart Disease Father         valve replacement     Lipids Father      Genitourinary Problems Brother         CKD     Cancer Brother         leukemia     Asthma No family hx of      C.A.D. No family hx of      Diabetes No family hx of      Cerebrovascular Disease No family hx of      Breast Cancer No family hx of      Cancer - colorectal No family hx of      Prostate Cancer No family hx of      Alzheimer Disease No family hx of      Arthritis No family hx of      Blood Disease No family hx of      Circulatory No family hx of      Gastrointestinal Disease No family hx of      Musculoskeletal Disorder No family hx of      Neurologic Disorder No family hx of      Respiratory No family hx of      Thyroid Disease No family hx of      Alcohol/Drug No family hx of      Allergies No family hx of      Anesthesia Reaction No family hx of      Congenital Anomalies No family hx of      Connective Tissue Disorder No family hx of      Other Cancer No family hx of      Depression No family hx of      Anxiety Disorder No family hx of      Mental Illness No family hx of      Substance Abuse No family hx of      Osteoporosis No family hx of      Genetic Disorder No family hx of      Obesity No family hx of          BP Readings from Last 3 Encounters:   02/17/20 130/80   11/15/19 122/70   11/04/19 (!) 152/90    Wt Readings from Last 3 Encounters:   02/17/20 75.2 kg (165 lb 11.2 oz)   11/15/19 73.3 kg (161 lb 9.6 oz)   11/04/19 74.3 kg (163 lb 14.4 oz)                    Reviewed and updated as needed this visit by Provider  Tobacco  Allergies  Meds  Problems  Med Hx  Surg Hx  Fam Hx         Review of Systems  "  CONSTITUTIONAL: NEGATIVE for fever, chills, change in weight  RESP: NEGATIVE for significant cough or SOB  CV: NEGATIVE for chest pain, palpitations or peripheral edema  MUSCULOSKELETAL:as above.   PSYCHIATRIC: as above       Objective   Reported vitals:  LMP  (LMP Unknown)    alert and no distress  PSYCH: Alert and oriented times 3; coherent speech, normal   rate and volume, able to articulate logical thoughts, able   to abstract reason, no tangential thoughts, no hallucinations   or delusions  Her affect is normal and pleasant  RESP: No cough, no audible wheezing, able to talk in full sentences  Remainder of exam unable to be completed due to telephone visits    Diagnostic Test Results:  Labs reviewed in Epic        Assessment/Plan:  1. DIANA (generalized anxiety disorder)  2. Depressive disorder in remission  Patient feels that things are \"under control\". She wishes to remain off the citalopram.   Things have settled at home with her daughter.   Reviewed PHQ9 and GAD7.   Medication list updated.   Continue with counseling.   Recheck for any concerns.     3. Foot injury, left, initial encounter  4. Toe injury, left, initial encounter  Patient with injury to left foot and 2nd-4th toes.   She has bruising and swelling as well as occasional tingling reported.   Discussed the limitations of not evaluating in person.   Will have her come in for an xray.   Further evaluation after that.   Patient agrees.   - XR Foot Left G/E 3 Views; Future    Return in 4 months (on 11/8/2020) for Physical Exam.      Phone call duration:  9 minutes    Erika Delgado MD        "

## 2020-07-08 ENCOUNTER — VIRTUAL VISIT (OUTPATIENT)
Dept: FAMILY MEDICINE | Facility: OTHER | Age: 55
End: 2020-07-08
Payer: COMMERCIAL

## 2020-07-08 DIAGNOSIS — F41.1 GAD (GENERALIZED ANXIETY DISORDER): Primary | ICD-10-CM

## 2020-07-08 DIAGNOSIS — S99.922A TOE INJURY, LEFT, INITIAL ENCOUNTER: ICD-10-CM

## 2020-07-08 DIAGNOSIS — S99.922A FOOT INJURY, LEFT, INITIAL ENCOUNTER: ICD-10-CM

## 2020-07-08 DIAGNOSIS — F32.A DEPRESSIVE DISORDER IN REMISSION: ICD-10-CM

## 2020-07-08 PROBLEM — F32.0 MILD MAJOR DEPRESSION (H): Status: RESOLVED | Noted: 2019-03-29 | Resolved: 2020-07-08

## 2020-07-08 PROCEDURE — 96127 BRIEF EMOTIONAL/BEHAV ASSMT: CPT | Mod: TEL | Performed by: FAMILY MEDICINE

## 2020-07-08 PROCEDURE — 99214 OFFICE O/P EST MOD 30 MIN: CPT | Mod: TEL | Performed by: FAMILY MEDICINE

## 2020-07-08 ASSESSMENT — PATIENT HEALTH QUESTIONNAIRE - PHQ9
5. POOR APPETITE OR OVEREATING: NOT AT ALL
SUM OF ALL RESPONSES TO PHQ QUESTIONS 1-9: 2

## 2020-07-08 ASSESSMENT — ANXIETY QUESTIONNAIRES
3. WORRYING TOO MUCH ABOUT DIFFERENT THINGS: NOT AT ALL
6. BECOMING EASILY ANNOYED OR IRRITABLE: NOT AT ALL
7. FEELING AFRAID AS IF SOMETHING AWFUL MIGHT HAPPEN: NOT AT ALL
5. BEING SO RESTLESS THAT IT IS HARD TO SIT STILL: NOT AT ALL
2. NOT BEING ABLE TO STOP OR CONTROL WORRYING: NOT AT ALL
1. FEELING NERVOUS, ANXIOUS, OR ON EDGE: NOT AT ALL
IF YOU CHECKED OFF ANY PROBLEMS ON THIS QUESTIONNAIRE, HOW DIFFICULT HAVE THESE PROBLEMS MADE IT FOR YOU TO DO YOUR WORK, TAKE CARE OF THINGS AT HOME, OR GET ALONG WITH OTHER PEOPLE: NOT DIFFICULT AT ALL
GAD7 TOTAL SCORE: 0

## 2020-07-09 ENCOUNTER — ANCILLARY PROCEDURE (OUTPATIENT)
Dept: GENERAL RADIOLOGY | Facility: OTHER | Age: 55
End: 2020-07-09
Attending: FAMILY MEDICINE
Payer: COMMERCIAL

## 2020-07-09 DIAGNOSIS — S99.922A FOOT INJURY, LEFT, INITIAL ENCOUNTER: ICD-10-CM

## 2020-07-09 DIAGNOSIS — S99.922A TOE INJURY, LEFT, INITIAL ENCOUNTER: ICD-10-CM

## 2020-07-09 PROCEDURE — 73630 X-RAY EXAM OF FOOT: CPT | Mod: LT

## 2020-07-09 ASSESSMENT — ANXIETY QUESTIONNAIRES: GAD7 TOTAL SCORE: 0

## 2020-09-10 ENCOUNTER — OFFICE VISIT (OUTPATIENT)
Dept: FAMILY MEDICINE | Facility: CLINIC | Age: 55
End: 2020-09-10
Payer: COMMERCIAL

## 2020-09-10 ENCOUNTER — ANCILLARY PROCEDURE (OUTPATIENT)
Dept: GENERAL RADIOLOGY | Facility: CLINIC | Age: 55
End: 2020-09-10
Attending: FAMILY MEDICINE
Payer: COMMERCIAL

## 2020-09-10 VITALS
DIASTOLIC BLOOD PRESSURE: 80 MMHG | HEART RATE: 72 BPM | WEIGHT: 178.5 LBS | OXYGEN SATURATION: 98 % | TEMPERATURE: 98.2 F | SYSTOLIC BLOOD PRESSURE: 138 MMHG | RESPIRATION RATE: 14 BRPM | BODY MASS INDEX: 32.33 KG/M2

## 2020-09-10 DIAGNOSIS — Z23 NEED FOR PROPHYLACTIC VACCINATION AND INOCULATION AGAINST INFLUENZA: ICD-10-CM

## 2020-09-10 DIAGNOSIS — M25.531 RIGHT WRIST PAIN: Primary | ICD-10-CM

## 2020-09-10 DIAGNOSIS — M25.531 RIGHT WRIST PAIN: ICD-10-CM

## 2020-09-10 PROCEDURE — 90682 RIV4 VACC RECOMBINANT DNA IM: CPT | Performed by: FAMILY MEDICINE

## 2020-09-10 PROCEDURE — 99213 OFFICE O/P EST LOW 20 MIN: CPT | Mod: 25 | Performed by: FAMILY MEDICINE

## 2020-09-10 PROCEDURE — 73110 X-RAY EXAM OF WRIST: CPT | Mod: RT

## 2020-09-10 PROCEDURE — 90471 IMMUNIZATION ADMIN: CPT | Performed by: FAMILY MEDICINE

## 2020-09-10 NOTE — PATIENT INSTRUCTIONS
Important Takeaway Points From This Visit:    It is ok to wear your wrist brace for the next 1-2 weeks.    I would do ice initially followed by heat after 1-2 days.    You can take ibuprofen 600 mg three times a day with food    You can also take tylenol 1000 mg three times a day      As always, please call with any questions or concerns. I look forward to seeing you again soon!    Take care,  Dr. Milian    Your current medication list is printed. Please keep this with you - it is helpful to bring this current list to any other medical appointments. It can also be helpful if you ever go to the emergency room or hospital.    If you had lab testing today we will call you with the results. The phone number we will call with your results is # [phone] (home) . If this is not the best number please call our clinic and change the number.    If you need any refills, please call your pharmacy and they will contact us.    If you have any further concerns or wish to schedule another appointment, please call our office at [phone].    If you have a medical emergency, please call [phone].    Thank you for coming to [facility]!

## 2020-09-10 NOTE — PROGRESS NOTES
Subjective     Gabriella Jeffries is a 54 year old female who presents to clinic today for the following health issues:    History of Present Illness        She eats 2-3 servings of fruits and vegetables daily.She consumes 0 sweetened beverage(s) daily.She exercises with enough effort to increase her heart rate 10 to 19 minutes per day.  She exercises with enough effort to increase her heart rate 3 or less days per week.   She is taking medications regularly.     Concern - right neck pain that goes down her right arm  Onset: 1 day  Description: pain, numb and tingly mostly in the wrist and hand  Intensity: moderate  Progression of Symptoms:  worsening  Accompanying Signs & Symptoms: throbbing   Previous history of similar problem: no  Precipitating factors:        Worsened by: moving  Alleviating factors:        Improved by: nothing  Therapies tried and outcome:  none     Patient reports gradual right forearm and wrist pain over the last day.  She has chronic right neck pain that is not worse than normal.  She denies any numbness or tingling in her extremities.  Pain is most prevalent over the radial aspect of the right wrist.  She denies any rash or trauma.  She was lifting a few things yesterday.  Is gradually worsening.  The pain goes up and down her forearm.  Pain is constant nature.  Worse with extension and flexion.    Review of Systems         Objective    /80 (BP Location: Left arm, Patient Position: Chair, Cuff Size: Adult Regular)   Pulse 72   Temp 98.2  F (36.8  C) (Temporal)   Resp 14   Wt 81 kg (178 lb 8 oz)   LMP  (LMP Unknown)   SpO2 98%   BMI 32.33 kg/m    Body mass index is 32.33 kg/m .  Physical Exam   General: Appears well and in no acute distress.  Cardiovascular: Regular rate and rhythm, normal S1 and S2 without murmur. No extra heartsounds or friction rub. Radial pulses present and equal bilaterally.  Respiratory:  Lungs clear to auscultation bilaterally. No wheezing or crackles. No  prolonged expiration. Symmetrical chest rise.  Musculoskeletal: Pain to palpation over extensor and flexor muscles of the forearm.  Large amount of tenderness over the distal radius.  She does have intact resisted wrist extension and flexion.  She is good strength in the intrinsic hand muscles.   slightly reduced on the right due to pain.  Derm: No suspicious lesions or rashes over exposed surfaces.    Labs/imaging    XR WRIST RT G/E 3 VW   9/10/2020 1:37 PM      HISTORY:  Right wrist pain                                                                      IMPRESSION:  Negative exam.    Assessment & Plan   1. Right wrist pain: There is no evidence of neurovascular damage.  Patient has good capillary refill and light touch sensation.  No known trauma.  X-ray unremarkable.  Patient seems to have pain over the flexor and extensor tendons/muscles of the right forearm.  Likely represents a strain due to activity yesterday.  Recommend ice next 24 hours and then switching to heat as long as over-the-counter Tylenol thousand milligrams 3 times a day and 600 mg ibuprofen 3 times a day with food.  Follow-up if new or worsening symptoms such as worsening pain, numbness, tingling, fever, rash, etc.  Patient is agreeable with plan will follow-up within 4 weeks if not proving.  - XR Wrist Right G/E 3 Views; Future    2. Need for prophylactic vaccination and inoculation against influenza  - INFLUENZA QUAD, RECOMBINANT, P-FREE (RIV4) (FLUBLOCK) [69217]  - Vaccine Administration, Initial [83820]      Return in about 4 weeks (around 10/8/2020), or if symptoms worsen or fail to improve.    José Miguel Milian MD  [facility]

## 2021-01-01 NOTE — PATIENT INSTRUCTIONS
Heat or ice 10-15 minutes twice or more daily to the area.      Recommend ibuprofen 600mg (3 pills of over the counter strength) three times daily with food for a week. Stop for any stomach irritation.     given to family

## 2021-01-04 ASSESSMENT — ANXIETY QUESTIONNAIRES
GAD7 TOTAL SCORE: 2
1. FEELING NERVOUS, ANXIOUS, OR ON EDGE: SEVERAL DAYS
2. NOT BEING ABLE TO STOP OR CONTROL WORRYING: SEVERAL DAYS
3. WORRYING TOO MUCH ABOUT DIFFERENT THINGS: NOT AT ALL
4. TROUBLE RELAXING: NOT AT ALL
5. BEING SO RESTLESS THAT IT IS HARD TO SIT STILL: NOT AT ALL
6. BECOMING EASILY ANNOYED OR IRRITABLE: NOT AT ALL
GAD7 TOTAL SCORE: 2
GAD7 TOTAL SCORE: 2
7. FEELING AFRAID AS IF SOMETHING AWFUL MIGHT HAPPEN: NOT AT ALL
7. FEELING AFRAID AS IF SOMETHING AWFUL MIGHT HAPPEN: NOT AT ALL

## 2021-01-04 ASSESSMENT — PATIENT HEALTH QUESTIONNAIRE - PHQ9
10. IF YOU CHECKED OFF ANY PROBLEMS, HOW DIFFICULT HAVE THESE PROBLEMS MADE IT FOR YOU TO DO YOUR WORK, TAKE CARE OF THINGS AT HOME, OR GET ALONG WITH OTHER PEOPLE: SOMEWHAT DIFFICULT
SUM OF ALL RESPONSES TO PHQ QUESTIONS 1-9: 3
SUM OF ALL RESPONSES TO PHQ QUESTIONS 1-9: 3

## 2021-01-05 ASSESSMENT — PATIENT HEALTH QUESTIONNAIRE - PHQ9: SUM OF ALL RESPONSES TO PHQ QUESTIONS 1-9: 3

## 2021-01-05 ASSESSMENT — ANXIETY QUESTIONNAIRES: GAD7 TOTAL SCORE: 2

## 2021-01-05 NOTE — PROGRESS NOTES
"Assessment & Plan     Chest pain, unspecified type  Labs as ordered also recommend Zio patch and echo stress test for further evaluation  - EKG 12-lead complete w/read - Clinics  - CBC with platelets and differential  - Troponin I  - Comprehensive metabolic panel  - XR Chest 2 Views  - Leadless EKG Monitor 8 to 14 Days; Future  - Magnesium    Anxiety  Discussed starting hydroxyzine mainly at night for rest and sleep as noted previously she has been having increased anxiety and waking up during night with feelings of increased anxiety Side effects of medications, proper use, the associated risk/benefits and other options were discussed. Patient understands these risks and agrees to take the medication as instructed.   Recommend follow-up in clinic in 6 weeks for mood  - hydrOXYzine (ATARAX) 10 MG tablet; Take 1 tablet (10 mg) by mouth every 8 hours as needed for anxiety    Elevated blood pressure reading without diagnosis of hypertension  Recommend monitoring blood pressure she is requesting monitor for home use I think this is appropriate given that she has had 1 previous elevated reading and today she had a elevated reading again we discussed her monitoring it over the next 3 days blood pressure and heart rate if above goal would start medication she will send numbers via [employer].  - Blood Pressure Monitoring (BLOOD PRESSURE MONITOR 3) MICHELLE; 1 Units daily    Chest wall pain  Due to chest wall pain over rib on left side will check D-dimer discussed with her that if this is elevated she will likely be referred for a CT scan patient verbalized understanding of this  - D dimer, quantitative  - Echocardiogram Exercise Stress; Future  6}     BMI:   Estimated body mass index is 33.33 kg/m  as calculated from the following:    Height as of 2/17/20: 1.582 m (5' 2.3\").    Weight as of this encounter: 83.5 kg (184 lb).   Weight management plan: Discussed healthy diet and exercise guidelines      Patient Instructions   I will " call you with lab results and any further treatment as indicated. Please take atarax 10 mg at evening time for anxiety and rest do not drive with this medication may cause drowsiness.     Please monitor blood pressure at home and send readings via my chart over next 3 days     Please follow up in 4-6 weeks     If symptoms of increased chest pain, sweating, or shortness of breath please go to emergency room         No follow-ups on file.    NUN[facility] DEE Jeffries is a 55 year old female who presents to clinic today for the following health issues :    History of Present Illness       Vascular Disease:  She presents for follow up of vascular disease.  She never takes nitroglycerin. She is not taking daily aspirin.    She eats 2-3 servings of fruits and vegetables daily.She consumes 0 sweetened beverage(s) daily.She exercises with enough effort to increase her heart rate 10 to 19 minutes per day.  She exercises with enough effort to increase her heart rate 3 or less days per week.   She is taking medications regularly.       Chest Pain  Onset/Duration: 1 month   Description:   Location: left side  Character: tight and heavy  Radiation: jaw and arm  Duration: constant all weekend. Felt better on monday  Intensity: moderate, severe  Progression of Symptoms: improving  Accompanying Signs & Symptoms:  Shortness of breath: YES  Sweating: no  Nausea/vomiting: YES  Lightheadedness: YES  Palpitations: no  Fever/Chills: no  Cough: no           Heartburn: thought it was heartburn but tried medication and it did not help.   History:   Family history of heart disease: YES  Tobacco use: no  Previous similar symptoms: no   Precipitating factors:   Worse with exertion: YES  Worse with deep breaths: no           Related to eating: no           Better with burping: no  Alleviating factors: rest  Therapies tried and outcome: anti-acids    Patient with a family history  of cardiovascular disease in both parents.   Also has history personally of depression she states that she has tried taking antidepressant Prozac in past and discontinued use due to side effects.  About a 20 pound weight gain over past year and personal history of elevated LDL.  Patient states that she started to notice increased pain and pressure left side of chest that radiated downward to her lateral ribs and upward into her neck and down her arm.  Patient did not go to the emergency room.  She treated herself at home with antiacids without help.  Patient states that pain has not gotten worse nor better.  She also complains of mild shortness of breath with ambulation and activities mild dizziness or lightheadedness she is also complaining of headache.  Noted that blood pressure is elevated today she has a history of elevated blood pressure without diagnosis of hypertension.  She states that her diet has not been very good lately and she does not exercise on a regular basis.  She also states that she has been under a lot of pressure lately and feels that she has more anxiety dealing with a daughter who has mental health issues and has recently relapsed.  She is teary-eyed today and is having a difficult time talking about the situation with her daughter.  She states that she will wake up multiple times during the night feeling impending doom, overwhelmed, and anxious.        Review of Systems   Constitutional, HEENT, cardiovascular, pulmonary, GI, , musculoskeletal, neuro, skin, endocrine and psych systems are negative, except as otherwise noted.      Objective    LMP  (LMP Unknown)   There is no height or weight on file to calculate BMI.  Physical Exam   GENERAL: healthy, alert and no distress  EYES: Eyes grossly normal to inspection, PERRL and conjunctivae and sclerae normal  HENT: ear canals and TM's normal, nose and mouth without ulcers or lesions  NECK: no adenopathy, no asymmetry, masses, or scars and  thyroid normal to palpation  RESP: lungs clear to auscultation - no rales, rhonchi or wheezes  BREAST: normal without masses, tenderness or nipple discharge and no palpable axillary masses or adenopathy  CV: regular rate and rhythm, normal S1 S2, no S3 or S4, no murmur, click or rub, no peripheral edema and peripheral pulses strong  ABDOMEN: soft, nontender, no hepatosplenomegaly, no masses and bowel sounds normal  MS: no gross musculoskeletal defects noted, no edema. Tenderness over left ribs 6-7 with palpation.   SKIN: no suspicious lesions or rashes  NEURO: Normal strength and tone, mentation intact and speech normal  PSYCH: mentation appears normal, tearful, anxious, judgement and insight intact and appearance well groomed    EKG - Reviewed and interpreted by me appears normal, NSR, normal axis, normal intervals, no acute ST/T changes c/w ischemia, no LVH by voltage criteria  Results for orders placed or performed in visit on 01/06/21   XR Chest 2 Views     Status: None    Narrative    XR CHEST 2 VW 1/6/2021 12:13 PM    HISTORY: Chest pain.    COMPARISON: None.      Impression    IMPRESSION: Negative chest.    ANNETTE HERMOSILLO MD   CBC with platelets and differential     Status: None   Result Value Ref Range    WBC 8.6 4.0 - 11.0 10e9/L    RBC Count 4.63 3.8 - 5.2 10e12/L    Hemoglobin 13.8 11.7 - 15.7 g/dL    Hematocrit 42.6 35.0 - 47.0 %    MCV 92 78 - 100 fl    MCH 29.8 26.5 - 33.0 pg    MCHC 32.4 31.5 - 36.5 g/dL    RDW 14.1 10.0 - 15.0 %    Platelet Count 440 150 - 450 10e9/L    % Neutrophils 68.9 %    % Lymphocytes 21.4 %    % Monocytes 8.0 %    % Eosinophils 1.4 %    % Basophils 0.3 %    Absolute Neutrophil 5.9 1.6 - 8.3 10e9/L    Absolute Lymphocytes 1.8 0.8 - 5.3 10e9/L    Absolute Monocytes 0.7 0.0 - 1.3 10e9/L    Absolute Eosinophils 0.1 0.0 - 0.7 10e9/L    Absolute Basophils 0.0 0.0 - 0.2 10e9/L    Diff Method Automated Method          Answers for HPI/ROS submitted by the patient on 1/4/2021    Chronic problems general questions HPI Form  If you checked off any problems, how difficult have these problems made it for you to do your work, take care of things at home, or get along with other people?: Somewhat difficult  PHQ9 TOTAL SCORE: 3  DIANA 7 TOTAL SCORE: 2

## 2021-01-06 ENCOUNTER — ANCILLARY PROCEDURE (OUTPATIENT)
Dept: GENERAL RADIOLOGY | Facility: CLINIC | Age: 56
End: 2021-01-06
Attending: NURSE PRACTITIONER
Payer: COMMERCIAL

## 2021-01-06 ENCOUNTER — OFFICE VISIT (OUTPATIENT)
Dept: FAMILY MEDICINE | Facility: CLINIC | Age: 56
End: 2021-01-06
Payer: COMMERCIAL

## 2021-01-06 VITALS
SYSTOLIC BLOOD PRESSURE: 166 MMHG | RESPIRATION RATE: 16 BRPM | OXYGEN SATURATION: 96 % | DIASTOLIC BLOOD PRESSURE: 102 MMHG | TEMPERATURE: 98.6 F | HEART RATE: 96 BPM | WEIGHT: 184 LBS | BODY MASS INDEX: 33.33 KG/M2

## 2021-01-06 DIAGNOSIS — R07.89 CHEST WALL PAIN: ICD-10-CM

## 2021-01-06 DIAGNOSIS — F41.9 ANXIETY: ICD-10-CM

## 2021-01-06 DIAGNOSIS — R07.9 CHEST PAIN, UNSPECIFIED TYPE: Primary | ICD-10-CM

## 2021-01-06 DIAGNOSIS — R03.0 ELEVATED BLOOD PRESSURE READING WITHOUT DIAGNOSIS OF HYPERTENSION: ICD-10-CM

## 2021-01-06 LAB
ALBUMIN SERPL-MCNC: 3.9 G/DL (ref 3.4–5)
ALP SERPL-CCNC: 87 U/L (ref 40–150)
ALT SERPL W P-5'-P-CCNC: 53 U/L (ref 0–50)
ANION GAP SERPL CALCULATED.3IONS-SCNC: 5 MMOL/L (ref 3–14)
AST SERPL W P-5'-P-CCNC: 27 U/L (ref 0–45)
BASOPHILS # BLD AUTO: 0 10E9/L (ref 0–0.2)
BASOPHILS NFR BLD AUTO: 0.3 %
BILIRUB SERPL-MCNC: 0.2 MG/DL (ref 0.2–1.3)
BUN SERPL-MCNC: 12 MG/DL (ref 7–30)
CALCIUM SERPL-MCNC: 8.9 MG/DL (ref 8.5–10.1)
CHLORIDE SERPL-SCNC: 104 MMOL/L (ref 94–109)
CO2 SERPL-SCNC: 29 MMOL/L (ref 20–32)
CREAT SERPL-MCNC: 0.71 MG/DL (ref 0.52–1.04)
D DIMER PPP FEU-MCNC: 0.3 UG/ML FEU (ref 0–0.5)
DIFFERENTIAL METHOD BLD: NORMAL
EOSINOPHIL # BLD AUTO: 0.1 10E9/L (ref 0–0.7)
EOSINOPHIL NFR BLD AUTO: 1.4 %
ERYTHROCYTE [DISTWIDTH] IN BLOOD BY AUTOMATED COUNT: 14.1 % (ref 10–15)
GFR SERPL CREATININE-BSD FRML MDRD: >90 ML/MIN/{1.73_M2}
GLUCOSE SERPL-MCNC: 95 MG/DL (ref 70–99)
HCT VFR BLD AUTO: 42.6 % (ref 35–47)
HGB BLD-MCNC: 13.8 G/DL (ref 11.7–15.7)
LYMPHOCYTES # BLD AUTO: 1.8 10E9/L (ref 0.8–5.3)
LYMPHOCYTES NFR BLD AUTO: 21.4 %
MAGNESIUM SERPL-MCNC: 2.4 MG/DL (ref 1.6–2.3)
MCH RBC QN AUTO: 29.8 PG (ref 26.5–33)
MCHC RBC AUTO-ENTMCNC: 32.4 G/DL (ref 31.5–36.5)
MCV RBC AUTO: 92 FL (ref 78–100)
MONOCYTES # BLD AUTO: 0.7 10E9/L (ref 0–1.3)
MONOCYTES NFR BLD AUTO: 8 %
NEUTROPHILS # BLD AUTO: 5.9 10E9/L (ref 1.6–8.3)
NEUTROPHILS NFR BLD AUTO: 68.9 %
PLATELET # BLD AUTO: 440 10E9/L (ref 150–450)
POTASSIUM SERPL-SCNC: 4.3 MMOL/L (ref 3.4–5.3)
PROT SERPL-MCNC: 7.8 G/DL (ref 6.8–8.8)
RBC # BLD AUTO: 4.63 10E12/L (ref 3.8–5.2)
SODIUM SERPL-SCNC: 138 MMOL/L (ref 133–144)
TROPONIN I SERPL-MCNC: <0.015 UG/L (ref 0–0.04)
WBC # BLD AUTO: 8.6 10E9/L (ref 4–11)

## 2021-01-06 PROCEDURE — 85379 FIBRIN DEGRADATION QUANT: CPT | Performed by: NURSE PRACTITIONER

## 2021-01-06 PROCEDURE — 80053 COMPREHEN METABOLIC PANEL: CPT | Performed by: NURSE PRACTITIONER

## 2021-01-06 PROCEDURE — 84484 ASSAY OF TROPONIN QUANT: CPT | Performed by: NURSE PRACTITIONER

## 2021-01-06 PROCEDURE — 36415 COLL VENOUS BLD VENIPUNCTURE: CPT | Performed by: NURSE PRACTITIONER

## 2021-01-06 PROCEDURE — 71046 X-RAY EXAM CHEST 2 VIEWS: CPT | Mod: FY | Performed by: RADIOLOGY

## 2021-01-06 PROCEDURE — 99214 OFFICE O/P EST MOD 30 MIN: CPT | Performed by: NURSE PRACTITIONER

## 2021-01-06 PROCEDURE — 85025 COMPLETE CBC W/AUTO DIFF WBC: CPT | Performed by: NURSE PRACTITIONER

## 2021-01-06 PROCEDURE — 93000 ELECTROCARDIOGRAM COMPLETE: CPT | Performed by: NURSE PRACTITIONER

## 2021-01-06 PROCEDURE — 83735 ASSAY OF MAGNESIUM: CPT | Performed by: NURSE PRACTITIONER

## 2021-01-06 RX ORDER — BLOOD PRESSURE TEST KIT-LARGE
1 KIT MISCELLANEOUS DAILY
Qty: 1 EACH | Refills: 0 | Status: SHIPPED | OUTPATIENT
Start: 2021-01-06

## 2021-01-06 RX ORDER — HYDROXYZINE HYDROCHLORIDE 10 MG/1
10 TABLET, FILM COATED ORAL EVERY 8 HOURS PRN
Qty: 15 TABLET | Refills: 0 | Status: SHIPPED | OUTPATIENT
Start: 2021-01-06 | End: 2021-04-04

## 2021-01-06 NOTE — PATIENT INSTRUCTIONS
I will call you with lab results and any further treatment as indicated. Please take atarax 10 mg at evening time for anxiety and rest do not drive with this medication may cause drowsiness.     Please monitor blood pressure at home and send readings via my chart over next 3 days     Please follow up in 4-6 weeks     If symptoms of increased chest pain, sweating, or shortness of breath please go to emergency room

## 2021-01-09 ENCOUNTER — MYC MEDICAL ADVICE (OUTPATIENT)
Dept: FAMILY MEDICINE | Facility: CLINIC | Age: 56
End: 2021-01-09

## 2021-01-09 DIAGNOSIS — I10 BENIGN ESSENTIAL HYPERTENSION: Primary | ICD-10-CM

## 2021-01-11 ENCOUNTER — HOSPITAL ENCOUNTER (OUTPATIENT)
Dept: CARDIOLOGY | Facility: CLINIC | Age: 56
Discharge: HOME | End: 2021-01-11
Attending: NURSE PRACTITIONER | Admitting: NURSE PRACTITIONER
Payer: COMMERCIAL

## 2021-01-11 DIAGNOSIS — R07.89 CHEST WALL PAIN: ICD-10-CM

## 2021-01-11 DIAGNOSIS — R07.9 CHEST PAIN, UNSPECIFIED TYPE: ICD-10-CM

## 2021-01-11 PROCEDURE — 93016 CV STRESS TEST SUPVJ ONLY: CPT | Performed by: INTERNAL MEDICINE

## 2021-01-11 PROCEDURE — 93350 STRESS TTE ONLY: CPT | Mod: 26 | Performed by: INTERNAL MEDICINE

## 2021-01-11 PROCEDURE — 93325 DOPPLER ECHO COLOR FLOW MAPG: CPT | Mod: 26 | Performed by: INTERNAL MEDICINE

## 2021-01-11 PROCEDURE — 93246 EXT ECG>7D<15D RECORDING: CPT

## 2021-01-11 PROCEDURE — 93321 DOPPLER ECHO F-UP/LMTD STD: CPT | Mod: 26 | Performed by: INTERNAL MEDICINE

## 2021-01-11 PROCEDURE — 93018 CV STRESS TEST I&R ONLY: CPT | Performed by: INTERNAL MEDICINE

## 2021-01-11 PROCEDURE — 93325 DOPPLER ECHO COLOR FLOW MAPG: CPT | Mod: TC

## 2021-01-11 PROCEDURE — 93350 STRESS TTE ONLY: CPT | Mod: TC

## 2021-01-11 RX ORDER — LISINOPRIL 20 MG/1
20 TABLET ORAL DAILY
Qty: 90 TABLET | Refills: 0 | Status: SHIPPED | OUTPATIENT
Start: 2021-01-11 | End: 2021-04-09

## 2021-01-11 NOTE — RESULT ENCOUNTER NOTE
Please advise Gabriella Jeffries,  1965, that her ECHO results were normal echo per cardiology read.   [phone] (home)   Thank you  Rain Maldonado CNP

## 2021-02-05 NOTE — RESULT ENCOUNTER NOTE
I attempted to call however unable to leave message. Please advise Gabriella Jeffries,  1965, that her zio patch results per cardiology read indicated normal rhythm with 3 events of and fast normal heart beat called (supraventricular tachycardia SVT) which is common and usually needs no treatment unless becomes symptomatic. Recommend continuing diet changes and treating high blood pressure.   [phone] (home)   Thank you  Rain Maldonado CNP

## 2021-02-08 ENCOUNTER — MYC MEDICAL ADVICE (OUTPATIENT)
Dept: FAMILY MEDICINE | Facility: CLINIC | Age: 56
End: 2021-02-08

## 2021-02-08 DIAGNOSIS — I10 BENIGN ESSENTIAL HYPERTENSION: Primary | ICD-10-CM

## 2021-02-08 RX ORDER — LOSARTAN POTASSIUM 25 MG/1
25 TABLET ORAL DAILY
Qty: 60 TABLET | Refills: 0 | Status: SHIPPED | OUTPATIENT
Start: 2021-02-08 | End: 2021-02-17

## 2021-02-17 ENCOUNTER — MYC MEDICAL ADVICE (OUTPATIENT)
Dept: FAMILY MEDICINE | Facility: CLINIC | Age: 56
End: 2021-02-17

## 2021-04-02 ENCOUNTER — E-VISIT (OUTPATIENT)
Dept: FAMILY MEDICINE | Facility: CLINIC | Age: 56
End: 2021-04-02
Payer: COMMERCIAL

## 2021-04-02 DIAGNOSIS — Z20.822 SUSPECTED COVID-19 VIRUS INFECTION: Primary | ICD-10-CM

## 2021-04-02 PROCEDURE — 99421 OL DIG E/M SVC 5-10 MIN: CPT | Performed by: FAMILY MEDICINE

## 2021-04-05 NOTE — PATIENT INSTRUCTIONS
Dear Gabriella Jeffries,    Your symptoms show that you may have coronavirus (COVID-19). This illness can cause fever, cough and trouble breathing. Many people get a mild case and get better on their own. Some people can get very sick.    Will I be tested for COVID-19?  We would like to test you for Covid-19 virus. I have placed orders for this test.     To schedule: go to your [employer] home page and scroll down to the section that says  You have an appointment that needs to be scheduled  and click the large green button that says  Schedule Now  and follow the steps to find the next available openings.    If you are unable to complete these [employer] scheduling steps, please call [phone] to schedule your testing.     Return to work/school/ guidance:  Please let your workplace manager and staffing office know when your quarantine ends     We can t give you an exact date as it depends on the above. You can calculate this on your own or work with your manager/staffing office to calculate this. (For example if you were exposed on 10/4, you would have to quarantine for 14 full days. That would be through 10/18. You could return on 10/19.)      If you receive a positive COVID-19 test result, follow the guidance of the those who are giving you the results. Usually the return to work is 10 (or in some cases 20 days from symptom onset.) If you work at [facility] [employer], you must also be cleared by Employee Occupational Health and Safety to return to work.        If you receive a negative COVID-19 test result and did not have a high risk exposure to someone with a known positive COVID-19 test, you can return to work once you're free of fever for 24 hours without fever-reducing medication and your symptoms are improving or resolved.      If you receive a negative COVID-19 test and If you had a high risk exposure to someone who has tested positive for COVID-19 then you can return to work 14 days after your last contact  with the positive individual    Note: If you have ongoing exposure to the covid positive person, this quarantine period may be more than 14 days. (For example, if you are continued to be exposed to your child who tested positive and cannot isolate from them, then the quarantine of 7-14 days can't start until your child is no longer contagious. This is typically 10 days from onset of the child's symptoms. So the total duration may be 17-24 days in this case.)    Sign up for [employer].   We know it's scary to hear that you might have COVID-19. We want to track your symptoms to make sure you're okay over the next 2 weeks. Please look for an email from [employer]--this is a free, online program that we'll use to keep in touch. To sign up, follow the link in the email you will receive. Learn more at [email or web address]    How can I take care of myself?    Get lots of rest. Drink extra fluids (unless a doctor has told you not to)    Take Tylenol (acetaminophen) or ibuprofen for fever or pain. If you have liver or kidney problems, ask your family doctor if it's okay to take Tylenol o ibuprofen    If you have other health problems (like cancer, heart failure, an organ transplant or severe kidney disease): Call your specialty clinic if you don't feel better in the next 2 days.    Know when to call 911. Emergency warning signs include:  o Trouble breathing or shortness of breath  o Pain or pressure in the chest that doesn't go away  o Feeling confused like you haven't felt before, or not being able to wake up  o Bluish-colored lips or face    Where can I get more information?  [facility] [employer] [facility] - About COVID-19:   [email or web address]    CDC - What to Do If You're Sick:   www.cdc.gov/coronavirus/2019-ncov/about/steps-when-sick.html

## 2021-04-06 DIAGNOSIS — Z20.822 SUSPECTED 2019 NOVEL CORONAVIRUS INFECTION: Primary | ICD-10-CM

## 2021-04-06 DIAGNOSIS — Z20.822 SUSPECTED COVID-19 VIRUS INFECTION: ICD-10-CM

## 2021-04-06 DIAGNOSIS — I10 BENIGN ESSENTIAL HYPERTENSION: ICD-10-CM

## 2021-04-06 PROCEDURE — U0005 INFEC AGEN DETEC AMPLI PROBE: HCPCS | Performed by: FAMILY MEDICINE

## 2021-04-06 PROCEDURE — U0003 INFECTIOUS AGENT DETECTION BY NUCLEIC ACID (DNA OR RNA); SEVERE ACUTE RESPIRATORY SYNDROME CORONAVIRUS 2 (SARS-COV-2) (CORONAVIRUS DISEASE [COVID-19]), AMPLIFIED PROBE TECHNIQUE, MAKING USE OF HIGH THROUGHPUT TECHNOLOGIES AS DESCRIBED BY CMS-2020-01-R: HCPCS | Performed by: FAMILY MEDICINE

## 2021-04-06 NOTE — PROGRESS NOTES
COVID-19 PCR test completed. Patient handout For Patients Who Have Been Tested for Covid-19 (Coronavirus) was given to the patient, which includes test result notification process.    
(0) independent

## 2021-04-07 LAB
SARS-COV-2 RNA RESP QL NAA+PROBE: NOT DETECTED
SPECIMEN SOURCE: NORMAL

## 2021-04-08 NOTE — TELEPHONE ENCOUNTER
Pending Prescriptions:                       Disp   Refills    lisinopril (ZESTRIL) 20 MG tablet [Pharmac*90 tab*0        Sig: TAKE 1 TABLET(20 MG) BY MOUTH DAILY      Routing refill request to provider for review/approval because:  Labs out of range:  Blood pressure    Anai Goins RN on 4/8/2021 at 2:28 PM

## 2021-04-09 RX ORDER — LISINOPRIL 20 MG/1
TABLET ORAL
Qty: 90 TABLET | Refills: 0 | Status: SHIPPED | OUTPATIENT
Start: 2021-04-09 | End: 2021-06-02

## 2021-04-09 NOTE — TELEPHONE ENCOUNTER
[facility] refill given will need ov for further as she is due for follow up HTN per last ov 1/6/2021 (f/u htn 4-6 weeks)     Thank you  Rain Maldonado CNP

## 2021-04-22 ENCOUNTER — IMMUNIZATION (OUTPATIENT)
Dept: PEDIATRICS | Facility: CLINIC | Age: 56
End: 2021-04-22
Payer: COMMERCIAL

## 2021-04-22 PROCEDURE — 0001A PR COVID VAC PFIZER DIL RECON 30 MCG/0.3 ML IM: CPT

## 2021-04-22 PROCEDURE — 91300 PR COVID VAC PFIZER DIL RECON 30 MCG/0.3 ML IM: CPT

## 2021-04-26 ENCOUNTER — TELEPHONE (OUTPATIENT)
Dept: FAMILY MEDICINE | Facility: CLINIC | Age: 56
End: 2021-04-26

## 2021-04-26 NOTE — TELEPHONE ENCOUNTER
Patient Quality Outreach Summary      Summary:    Patient is due/failing the following:   Physical and Mammogram     Type of outreach:    Phone, left message for patient/parent to call back.    Questions for provider review:    None                                                                                                                    Delores Sanders CMA       Chart routed to Care Team.

## 2021-04-27 ENCOUNTER — MYC MEDICAL ADVICE (OUTPATIENT)
Dept: FAMILY MEDICINE | Facility: CLINIC | Age: 56
End: 2021-04-27

## 2021-05-05 DIAGNOSIS — Z12.31 VISIT FOR SCREENING MAMMOGRAM: ICD-10-CM

## 2021-05-05 PROCEDURE — 77063 BREAST TOMOSYNTHESIS BI: CPT | Mod: GC | Performed by: RADIOLOGY

## 2021-05-05 PROCEDURE — 77067 SCR MAMMO BI INCL CAD: CPT | Mod: GC | Performed by: RADIOLOGY

## 2021-05-13 ENCOUNTER — IMMUNIZATION (OUTPATIENT)
Dept: PEDIATRICS | Facility: CLINIC | Age: 56
End: 2021-05-13
Attending: INTERNAL MEDICINE
Payer: COMMERCIAL

## 2021-05-13 PROCEDURE — 0002A PR COVID VAC PFIZER DIL RECON 30 MCG/0.3 ML IM: CPT

## 2021-05-13 PROCEDURE — 91300 PR COVID VAC PFIZER DIL RECON 30 MCG/0.3 ML IM: CPT

## 2021-05-19 ENCOUNTER — ANCILLARY PROCEDURE (OUTPATIENT)
Dept: ULTRASOUND IMAGING | Facility: CLINIC | Age: 56
End: 2021-05-19
Attending: FAMILY MEDICINE
Payer: COMMERCIAL

## 2021-05-19 ENCOUNTER — ANCILLARY PROCEDURE (OUTPATIENT)
Dept: MAMMOGRAPHY | Facility: CLINIC | Age: 56
End: 2021-05-19
Attending: FAMILY MEDICINE
Payer: COMMERCIAL

## 2021-05-19 DIAGNOSIS — R92.8 ABNORMAL MAMMOGRAM: ICD-10-CM

## 2021-05-19 PROCEDURE — G0279 TOMOSYNTHESIS, MAMMO: HCPCS | Performed by: RADIOLOGY

## 2021-05-19 PROCEDURE — 76642 ULTRASOUND BREAST LIMITED: CPT | Mod: RT | Performed by: RADIOLOGY

## 2021-05-19 PROCEDURE — 77065 DX MAMMO INCL CAD UNI: CPT | Performed by: RADIOLOGY

## 2021-06-01 ASSESSMENT — ANXIETY QUESTIONNAIRES
GAD7 TOTAL SCORE: 2
2. NOT BEING ABLE TO STOP OR CONTROL WORRYING: NOT AT ALL
7. FEELING AFRAID AS IF SOMETHING AWFUL MIGHT HAPPEN: NOT AT ALL
3. WORRYING TOO MUCH ABOUT DIFFERENT THINGS: NOT AT ALL
7. FEELING AFRAID AS IF SOMETHING AWFUL MIGHT HAPPEN: NOT AT ALL
4. TROUBLE RELAXING: NOT AT ALL
GAD7 TOTAL SCORE: 2
1. FEELING NERVOUS, ANXIOUS, OR ON EDGE: SEVERAL DAYS
5. BEING SO RESTLESS THAT IT IS HARD TO SIT STILL: NOT AT ALL
6. BECOMING EASILY ANNOYED OR IRRITABLE: SEVERAL DAYS
GAD7 TOTAL SCORE: 2

## 2021-06-01 ASSESSMENT — ENCOUNTER SYMPTOMS
PARESTHESIAS: 0
CONSTIPATION: 0
ABDOMINAL PAIN: 0
SHORTNESS OF BREATH: 0
ARTHRALGIAS: 1
PALPITATIONS: 0
FEVER: 0
EYE PAIN: 0
MYALGIAS: 1
HEMATOCHEZIA: 0
HEARTBURN: 1
JOINT SWELLING: 0
DYSURIA: 0
SORE THROAT: 0
COUGH: 0
NAUSEA: 0
HEMATURIA: 0
HEADACHES: 1
WEAKNESS: 0
NERVOUS/ANXIOUS: 0
DIZZINESS: 0
DIARRHEA: 0
BREAST MASS: 0
CHILLS: 0
FREQUENCY: 0

## 2021-06-01 ASSESSMENT — PATIENT HEALTH QUESTIONNAIRE - PHQ9
SUM OF ALL RESPONSES TO PHQ QUESTIONS 1-9: 2
SUM OF ALL RESPONSES TO PHQ QUESTIONS 1-9: 2
10. IF YOU CHECKED OFF ANY PROBLEMS, HOW DIFFICULT HAVE THESE PROBLEMS MADE IT FOR YOU TO DO YOUR WORK, TAKE CARE OF THINGS AT HOME, OR GET ALONG WITH OTHER PEOPLE: NOT DIFFICULT AT ALL

## 2021-06-01 NOTE — PROGRESS NOTES
SUBJECTIVE:   CC: Gabriella Jeffries is an 55 year old woman who presents for preventive health visit.       Patient has been advised of split billing requirements and indicates understanding: Yes  Healthy Habits:     Getting at least 3 servings of Calcium per day:  Yes    Bi-annual eye exam:  Yes    Dental care twice a year:  Yes    Sleep apnea or symptoms of sleep apnea:  Excessive snoring    Diet:  Regular (no restrictions)    Frequency of exercise:  4-5 days/week    Duration of exercise:  15-30 minutes    Taking medications regularly:  Yes    Medication side effects:  None    PHQ-2 Total Score: 0    Additional concerns today:  No      HYPERTENSION  On lisinopril 20 mg daily.   Checking blood pressure at home. Has been 130/80.   No chest pain or shortness of breath.   She has noted some myalgias especially with her new job. 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     PSORIASIS  Utilizes clobetasol. This works well.   She would like a refill on hand.       Today's PHQ-2 Score:   PHQ-2 ( 1999 Pfizer) 6/1/2021   Q1: Little interest or pleasure in doing things 0   Q2: Feeling down, depressed or hopeless 0   PHQ-2 Score 0   Q1: Little interest or pleasure in doing things Not at all   Q2: Feeling down, depressed or hopeless Not at all   PHQ-2 Score 0     Answers for HPI/ROS submitted by the patient on 6/1/2021   Annual Exam:  If you checked off any problems, how difficult have these problems made it for you to do your work, take care of things at home, or get along with other people?: Not difficult at all  PHQ9 TOTAL SCORE: 2  DIANA 7 TOTAL SCORE: 2      Abuse: Current or Past (Physical, Sexual or Emotional) - No  Do you feel safe in your environment? Yes    Have you ever done Advance Care Planning? (For example, a Health Directive, POLST, or a discussion with a medical provider or your loved ones about your wishes): No, advance care planning information given to patient to review.  Patient plans to discuss their wishes with  loved ones or provider.      Social History     Tobacco Use     Smoking status: Never Smoker     Smokeless tobacco: Never Used   Substance Use Topics     Alcohol use: Yes     Comment: Social / 1 or 2 per week          No flowsheet data found.    Reviewed orders with patient.  Reviewed health maintenance and updated orders accordingly - Yes  BP Readings from Last 3 Encounters:   06/02/21 134/84   01/06/21 (!) 166/102   09/10/20 138/80    Wt Readings from Last 3 Encounters:   06/02/21 74.8 kg (165 lb)   01/06/21 83.5 kg (184 lb)   09/10/20 81 kg (178 lb 8 oz)                    Breast Cancer Screening:  Any new diagnosis of family breast, ovarian, or bowel cancer? No    FSH-7:   Breast CA Risk Assessment (FHS-7) 6/1/2021   Did any of your first-degree relatives have breast or ovarian cancer? No   Did any of your relatives have bilateral breast cancer? No   Did any man in your family have breast cancer? No   Did any woman in your family have breast and ovarian cancer? Yes   Did any woman in your family have breast cancer before age 50 y? Yes   Do you have 2 or more relatives with breast and/or ovarian cancer? No   Do you have 2 or more relatives with breast and/or bowel cancer? No       Mammogram Screening: Recommended annual mammography  Pertinent mammograms are reviewed under the imaging tab.    History of abnormal Pap smear: Status post benign hysterectomy. Health Maintenance and Surgical History updated.     Reviewed and updated as needed this visit by clinical staff  Tobacco  Allergies  Meds  Problems  Med Hx  Surg Hx  Fam Hx  Soc Hx          Reviewed and updated as needed this visit by Provider  Tobacco  Allergies  Meds  Problems  Med Hx  Surg Hx  Fam Hx             Review of Systems  CONSTITUTIONAL: NEGATIVE for fever, chills, change in weight  INTEGUMENTARY/SKIN: NEGATIVE for worrisome rashes, moles or lesions  EYES: NEGATIVE for vision changes or irritation  ENT: NEGATIVE for ear, mouth and  "throat problems  RESP: NEGATIVE for significant cough or SOB  BREAST: NEGATIVE for masses, tenderness or discharge  CV: NEGATIVE for chest pain, palpitations or peripheral edema  GI: NEGATIVE for nausea, abdominal pain, heartburn, or change in bowel habits  : NEGATIVE for unusual urinary or vaginal symptoms. No vaginal bleeding.  MUSCULOSKELETAL:as above.   NEURO: NEGATIVE for weakness, dizziness or paresthesias  PSYCHIATRIC: NEGATIVE for changes in mood or affect      OBJECTIVE:   /84   Pulse 69   Temp 97.8  F (36.6  C) (Temporal)   Resp 16   Ht 1.59 m (5' 2.6\")   Wt 74.8 kg (165 lb)   LMP  (LMP Unknown)   SpO2 98%   BMI 29.61 kg/m    Physical Exam  GENERAL APPEARANCE: healthy, alert and no distress  EYES: Eyes grossly normal to inspection, PERRL and conjunctivae and sclerae normal  HENT: ear canals and TM's normal, nose and mouth without ulcers or lesions, oropharynx clear and oral mucous membranes moist  NECK: no adenopathy, no asymmetry, masses, or scars and thyroid normal to palpation  RESP: lungs clear to auscultation - no rales, rhonchi or wheezes  CV: regular rate and rhythm, normal S1 S2, no S3 or S4, no murmur, click or rub, no peripheral edema and peripheral pulses strong  ABDOMEN: soft, nontender, no hepatosplenomegaly, no masses and bowel sounds normal  MS: no musculoskeletal defects are noted and gait is age appropriate without ataxia  SKIN: no suspicious lesions or rashes  NEURO: Normal strength and tone, sensory exam grossly normal, mentation intact and speech normal  PSYCH: mentation appears normal and affect normal/bright    Diagnostic Test Results:  Labs reviewed in Epic    ASSESSMENT/PLAN:   1. Routine general medical examination at a health care facility  See counseling messages     2. Benign essential hypertension  Doing well. Well controlled. Tolerating medication.  No change in plan.    - lisinopril (ZESTRIL) 20 MG tablet; Take 1 tablet (20 mg) by mouth daily  Dispense: 90 " "tablet; Refill: 3    3. Psoriasis  Doing well. Well controlled. Tolerating medication.  No change in plan.    - clobetasol (TEMOVATE) 0.05 % external ointment; Apply  topically 2 times daily as needed. Apply sparingly to affected area.  Do not apply to face.  Dispense: 45 g; Refill: 1    4. Myalgia  Patient with myalgias.   Checking bmp today. Also ESR.   Consider further workup depending on results.   - ESR: Erythrocyte sedimentation rate    5. Encounter for long-term (current) use of medications  Will notify of results.   - Basic metabolic panel    Patient has been advised of split billing requirements and indicates understanding: No  COUNSELING:  Reviewed preventive health counseling, as reflected in patient instructions       Regular exercise       Healthy diet/nutrition    Estimated body mass index is 29.61 kg/m  as calculated from the following:    Height as of this encounter: 1.59 m (5' 2.6\").    Weight as of this encounter: 74.8 kg (165 lb).    Weight management plan: Discussed healthy diet and exercise guidelines    She reports that she has never smoked. She has never used smokeless tobacco.      Counseling Resources:  ATP IV Guidelines  Pooled Cohorts Equation Calculator  Breast Cancer Risk Calculator  BRCA-Related Cancer Risk Assessment: FHS-7 Tool  FRAX Risk Assessment  ICSI Preventive Guidelines  Dietary Guidelines for Americans, 2010  USDA's MyPlate  ASA Prophylaxis  Lung CA Screening    Erika Delgado MD  [facility]  "

## 2021-06-02 ENCOUNTER — OFFICE VISIT (OUTPATIENT)
Dept: FAMILY MEDICINE | Facility: CLINIC | Age: 56
End: 2021-06-02
Payer: COMMERCIAL

## 2021-06-02 VITALS
WEIGHT: 165 LBS | DIASTOLIC BLOOD PRESSURE: 84 MMHG | HEIGHT: 63 IN | HEART RATE: 69 BPM | BODY MASS INDEX: 29.23 KG/M2 | OXYGEN SATURATION: 98 % | SYSTOLIC BLOOD PRESSURE: 134 MMHG | RESPIRATION RATE: 16 BRPM | TEMPERATURE: 97.8 F

## 2021-06-02 DIAGNOSIS — Z00.00 ROUTINE GENERAL MEDICAL EXAMINATION AT A HEALTH CARE FACILITY: Primary | ICD-10-CM

## 2021-06-02 DIAGNOSIS — I10 BENIGN ESSENTIAL HYPERTENSION: ICD-10-CM

## 2021-06-02 DIAGNOSIS — Z79.899 ENCOUNTER FOR LONG-TERM (CURRENT) USE OF MEDICATIONS: ICD-10-CM

## 2021-06-02 DIAGNOSIS — L40.9 PSORIASIS: ICD-10-CM

## 2021-06-02 DIAGNOSIS — M79.10 MYALGIA: ICD-10-CM

## 2021-06-02 LAB — ERYTHROCYTE [SEDIMENTATION RATE] IN BLOOD BY WESTERGREN METHOD: 14 MM/H (ref 0–30)

## 2021-06-02 PROCEDURE — 80048 BASIC METABOLIC PNL TOTAL CA: CPT | Performed by: FAMILY MEDICINE

## 2021-06-02 PROCEDURE — 99213 OFFICE O/P EST LOW 20 MIN: CPT | Mod: 25 | Performed by: FAMILY MEDICINE

## 2021-06-02 PROCEDURE — 85652 RBC SED RATE AUTOMATED: CPT | Performed by: FAMILY MEDICINE

## 2021-06-02 PROCEDURE — 99396 PREV VISIT EST AGE 40-64: CPT | Performed by: FAMILY MEDICINE

## 2021-06-02 PROCEDURE — 36415 COLL VENOUS BLD VENIPUNCTURE: CPT | Performed by: FAMILY MEDICINE

## 2021-06-02 RX ORDER — LISINOPRIL 20 MG/1
20 TABLET ORAL DAILY
Qty: 90 TABLET | Refills: 3 | Status: SHIPPED | OUTPATIENT
Start: 2021-06-02 | End: 2022-11-16

## 2021-06-02 RX ORDER — CLOBETASOL PROPIONATE 0.5 MG/G
OINTMENT TOPICAL
Qty: 45 G | Refills: 1 | Status: SHIPPED | OUTPATIENT
Start: 2021-06-02 | End: 2022-11-16

## 2021-06-02 ASSESSMENT — ANXIETY QUESTIONNAIRES: GAD7 TOTAL SCORE: 2

## 2021-06-02 ASSESSMENT — MIFFLIN-ST. JEOR: SCORE: 1311.18

## 2021-06-02 ASSESSMENT — PATIENT HEALTH QUESTIONNAIRE - PHQ9: SUM OF ALL RESPONSES TO PHQ QUESTIONS 1-9: 2

## 2021-06-03 LAB
ANION GAP SERPL CALCULATED.3IONS-SCNC: 4 MMOL/L (ref 3–14)
BUN SERPL-MCNC: 12 MG/DL (ref 7–30)
CALCIUM SERPL-MCNC: 9.3 MG/DL (ref 8.5–10.1)
CHLORIDE SERPL-SCNC: 106 MMOL/L (ref 94–109)
CO2 SERPL-SCNC: 29 MMOL/L (ref 20–32)
CREAT SERPL-MCNC: 0.8 MG/DL (ref 0.52–1.04)
GFR SERPL CREATININE-BSD FRML MDRD: 83 ML/MIN/{1.73_M2}
GLUCOSE SERPL-MCNC: 110 MG/DL (ref 70–99)
POTASSIUM SERPL-SCNC: 4.1 MMOL/L (ref 3.4–5.3)
SODIUM SERPL-SCNC: 139 MMOL/L (ref 133–144)

## 2021-10-03 ENCOUNTER — MYC MEDICAL ADVICE (OUTPATIENT)
Dept: FAMILY MEDICINE | Facility: CLINIC | Age: 56
End: 2021-10-03

## 2022-05-11 ENCOUNTER — OFFICE VISIT (OUTPATIENT)
Dept: FAMILY MEDICINE | Facility: CLINIC | Age: 57
End: 2022-05-11
Payer: COMMERCIAL

## 2022-05-11 ENCOUNTER — TELEPHONE (OUTPATIENT)
Dept: FAMILY MEDICINE | Facility: CLINIC | Age: 57
End: 2022-05-11

## 2022-05-11 VITALS
HEIGHT: 62 IN | BODY MASS INDEX: 31.04 KG/M2 | OXYGEN SATURATION: 99 % | SYSTOLIC BLOOD PRESSURE: 138 MMHG | DIASTOLIC BLOOD PRESSURE: 86 MMHG | HEART RATE: 64 BPM | TEMPERATURE: 98.5 F | WEIGHT: 168.7 LBS | RESPIRATION RATE: 18 BRPM

## 2022-05-11 DIAGNOSIS — M25.561 RIGHT KNEE PAIN, UNSPECIFIED CHRONICITY: Primary | ICD-10-CM

## 2022-05-11 DIAGNOSIS — Z12.31 ENCOUNTER FOR SCREENING MAMMOGRAM FOR MALIGNANT NEOPLASM OF BREAST: ICD-10-CM

## 2022-05-11 DIAGNOSIS — I10 BENIGN ESSENTIAL HYPERTENSION: ICD-10-CM

## 2022-05-11 PROCEDURE — 99214 OFFICE O/P EST MOD 30 MIN: CPT | Performed by: PHYSICIAN ASSISTANT

## 2022-05-11 RX ORDER — LISINOPRIL 40 MG/1
40 TABLET ORAL DAILY
Qty: 90 TABLET | Refills: 0 | Status: SHIPPED | OUTPATIENT
Start: 2022-05-11 | End: 2022-08-17

## 2022-05-11 ASSESSMENT — PAIN SCALES - GENERAL: PAINLEVEL: MODERATE PAIN (4)

## 2022-05-11 ASSESSMENT — ANXIETY QUESTIONNAIRES
7. FEELING AFRAID AS IF SOMETHING AWFUL MIGHT HAPPEN: NOT AT ALL
7. FEELING AFRAID AS IF SOMETHING AWFUL MIGHT HAPPEN: NOT AT ALL
4. TROUBLE RELAXING: SEVERAL DAYS
1. FEELING NERVOUS, ANXIOUS, OR ON EDGE: SEVERAL DAYS
6. BECOMING EASILY ANNOYED OR IRRITABLE: SEVERAL DAYS
GAD7 TOTAL SCORE: 5
GAD7 TOTAL SCORE: 5
8. IF YOU CHECKED OFF ANY PROBLEMS, HOW DIFFICULT HAVE THESE MADE IT FOR YOU TO DO YOUR WORK, TAKE CARE OF THINGS AT HOME, OR GET ALONG WITH OTHER PEOPLE?: SOMEWHAT DIFFICULT
5. BEING SO RESTLESS THAT IT IS HARD TO SIT STILL: NOT AT ALL
2. NOT BEING ABLE TO STOP OR CONTROL WORRYING: SEVERAL DAYS
GAD7 TOTAL SCORE: 5
3. WORRYING TOO MUCH ABOUT DIFFERENT THINGS: SEVERAL DAYS

## 2022-05-11 ASSESSMENT — PATIENT HEALTH QUESTIONNAIRE - PHQ9
10. IF YOU CHECKED OFF ANY PROBLEMS, HOW DIFFICULT HAVE THESE PROBLEMS MADE IT FOR YOU TO DO YOUR WORK, TAKE CARE OF THINGS AT HOME, OR GET ALONG WITH OTHER PEOPLE: NOT DIFFICULT AT ALL
SUM OF ALL RESPONSES TO PHQ QUESTIONS 1-9: 2
SUM OF ALL RESPONSES TO PHQ QUESTIONS 1-9: 2

## 2022-05-11 NOTE — PROGRESS NOTES
"  Assessment & Plan     Right knee pain, unspecified chronicity  Medial pain on exam and w/meniscal testing as well as some features of PFS   Discussed xray to evaluate medial compartment for OA changes. Unable to do today - xray on site not available. She will return.   Discussed d/dx. PFS sx would benefit from PT, meniscal sx may or may not.   PT referral placed.   She is interested in MRI - order placed.   Ice   nsaids - may exacerbate HTN . Can try sparingly w/monitoring BP  Tylenol.   - XR Knee Right 3 Views; Future  - Physical Therapy Referral; Future  - MR Knee Right w/o Contrast; Future    Benign essential hypertension  Rooming BP was elevated. Home readings running above goal.  Increase lisinopril to 40mg.   Monitor on home readings.  Return in 3-4 weeks for recheck w/labs. She is due for annual and could be done at the same visit.    - lisinopril (ZESTRIL) 40 MG tablet; Take 1 tablet (40 mg) by mouth daily    Encounter for screening mammogram for malignant neoplasm of breast  Due for mammogram. No sx. Ordered.   - MA Screen Bilateral w/Kenneth; Future     BMI:   Estimated body mass index is 30.46 kg/m  as calculated from the following:    Height as of this encounter: 1.585 m (5' 2.4\").    Weight as of this encounter: 76.5 kg (168 lb 11.2 oz).   Weight management plan: Discussed healthy diet and exercise guidelines    Follow Up: The patient was instructed to contact clinic for worsening symptoms, non-improvement in time frame as expected/discussed, and for questions regarding medications or treatment plan. For virtual visits, the patient was advised to be seen for in person evaluation if symptoms or condition are worsening or non-improvement as expected.       Return in about 4 weeks (around 6/8/2022).    Ethel Schmidt PA-C  [facility] DEE Quiroz is a 56 year old who presents for the following health issues     History of Present Illness       Mental Health Follow-up: " "                   Today's PHQ-9         PHQ-9 Total Score: 2  PHQ-9 Q9 Thoughts of better off dead/self-harm past 2 weeks :   (P) Not at all    How difficult have these problems made it for you to do your work, take care of things at home, or get along with other people: Not difficult at all    Today's DIANA-7 Score: 5    Reason for visit:  Knee pain  Symptoms include:  Knee meng, sore, gives out, pain  Symptom intensity:  Moderate  Symptom progression:  Worsening  Had these symptoms before:  No  What makes it worse:  Climbing stairs, walk-in up, sitting for long periods of time  What makes it better:  Rest, stretching    She eats 2-3 servings of fruits and vegetables daily.She consumes 0 sweetened beverage(s) daily.She exercises with enough effort to increase her heart rate 10 to 19 minutes per day.  She exercises with enough effort to increase her heart rate 3 or less days per week. She is missing 1 dose(s) of medications per week.  She is not taking prescribed medications regularly due to remembering to take.     RIGHT Knee pain-   Started a few mo ago. Felt stiff at first.   No trauma or injury mechanism she can identify.   Pain anterior but seems to wrap around to back  Meng and \"gives out\".   Hard to walk when getting up from being seated. Popping, makes noise.  Last night was going up stairs and felt like knee gave out and almost fell.   Locking and catching sensation sometimes  Swelling sometimes.   No regular exercise. Hiking sometimes and walking. Walked 2miles yesterday.   Just started ibuprofen- 3 tabs twice daily.   Iced last night.   Work- desk based.     Hypertension-   On lisinopril 20mg daily.   Home pressures \"always\" running higher 140s/90 and lately has been into 150s/90s. Sometimes when really high feels nauseous.   Has had a lot of stress at work.   Does limit salt in diet.     Review of Systems   Constitutional, HEENT, cardiovascular, pulmonary, gi and gu systems are negative, except as " "otherwise noted.      Objective    /86   Pulse 64   Temp 98.5  F (36.9  C) (Temporal)   Resp 18   Ht 1.585 m (5' 2.4\")   Wt 76.5 kg (168 lb 11.2 oz)   LMP  (LMP Unknown)   SpO2 99%   BMI 30.46 kg/m    Body mass index is 30.46 kg/m .  Physical Exam   General: well appearing, pleasant 56 year old NAD  Knee: RIGHT: no skin changes, bruising. Symmetric. No obvious effusion or swelling. Exquisitely tender to palpation at medial joint line. Patella nontender to exam. No popliteal fullness or tenderness. Discomfort with full extension, flexion past 90 degrees. No laxity with ligament testing. Meniscal testing + pain medially. No calf pain or tenderness. Posterior tibial pulses normal, no edema, sensation intact. Gait normal.                 "

## 2022-05-11 NOTE — TELEPHONE ENCOUNTER
Staff LM regarding PT's upcoming appointment and to please call the clinic back. Call back number was left.     If PT calls back staff was trying to inform PT that [facility] is without x-ray machine at this time and wanted to make sure PT still wanted to come in for appointment.  PT can be referred to another clinic after visit for x-ray if needed, but wanted to make PT aware before making the trip in.     Donita Boggs

## 2022-05-11 NOTE — PATIENT INSTRUCTIONS
Increase lisinopril dose [id number] to 40mg daily  Monitor home BP.   Recheck in 3-4 weeks in clinic at visit and with labs     ---    Return for knee xray.     Physical Therapy:  [facility]  [phone]    Ice    Ibuprofen - is helpful for pain and inflammation. NSAIDs like this can raise blood pressure.  Acetaminophen 1000mg every 8 hr.     To schedule your Imaging Test or Specialty Referral please call: MRI - would evaluate the meniscus if sx not improving. Also info to schedule mammogram    [facility]   Radiology (imaging- mammogram, ultrasound, CT, MRI): [phone]  Speciality consultation [phone]  [address]  [facility] [address]    [facility]  [address] [place] [address]  [phone]  Specialty consultation [phone]  Colonoscopy [phone]       Other Mammogram Options:  [place] Locations:   [place], [place], [place], [place]*, [place]* ([facility]), [facility]*-- Call to schedule [phone]    [place] Locations:  [place], [place], [facility] ([place], [place]*, [facility] [place] [place] [place], [place], [place]-- Call to schedule [phone]     [facility] Locations:  [place]* and [facility]-- Call to schedule [phone]    *locations marked with a star have 3D mammography available

## 2022-05-12 ENCOUNTER — APPOINTMENT (OUTPATIENT)
Dept: GENERAL RADIOLOGY | Facility: CLINIC | Age: 57
End: 2022-05-12
Payer: COMMERCIAL

## 2022-05-12 ASSESSMENT — ANXIETY QUESTIONNAIRES: GAD7 TOTAL SCORE: 5

## 2022-05-12 ASSESSMENT — PATIENT HEALTH QUESTIONNAIRE - PHQ9: SUM OF ALL RESPONSES TO PHQ QUESTIONS 1-9: 2

## 2022-06-06 ENCOUNTER — ANCILLARY PROCEDURE (OUTPATIENT)
Dept: MRI IMAGING | Facility: CLINIC | Age: 57
End: 2022-06-06
Attending: PHYSICIAN ASSISTANT
Payer: COMMERCIAL

## 2022-06-06 ENCOUNTER — ANCILLARY PROCEDURE (OUTPATIENT)
Dept: MAMMOGRAPHY | Facility: CLINIC | Age: 57
End: 2022-06-06
Attending: PHYSICIAN ASSISTANT
Payer: COMMERCIAL

## 2022-06-06 DIAGNOSIS — M25.561 RIGHT KNEE PAIN, UNSPECIFIED CHRONICITY: Primary | ICD-10-CM

## 2022-06-06 DIAGNOSIS — M25.561 RIGHT KNEE PAIN, UNSPECIFIED CHRONICITY: ICD-10-CM

## 2022-06-06 DIAGNOSIS — Z12.31 ENCOUNTER FOR SCREENING MAMMOGRAM FOR MALIGNANT NEOPLASM OF BREAST: ICD-10-CM

## 2022-06-06 PROCEDURE — 77063 BREAST TOMOSYNTHESIS BI: CPT | Mod: GC

## 2022-06-06 PROCEDURE — 73721 MRI JNT OF LWR EXTRE W/O DYE: CPT | Mod: RT | Performed by: RADIOLOGY

## 2022-06-06 PROCEDURE — 77067 SCR MAMMO BI INCL CAD: CPT | Mod: GC

## 2022-06-17 NOTE — TELEPHONE ENCOUNTER
DIAGNOSIS: medial knee pain- medial meniscus changes on MRI   APPOINTMENT DATE: 06/20/2022   NOTES STATUS DETAILS   OFFICE NOTE from referring provider Internal 05/11/2022 Dr Schmidt   OFFICE NOTE from other specialist N/A    DISCHARGE SUMMARY from hospital N/A    DISCHARGE REPORT from the ER N/A    OPERATIVE REPORT N/A    EMG report N/A    MEDICATION LIST N/A    MRI Internal 06/06/2022 RT knee   DEXA (osteoporosis/bone health) N/A    CT SCAN N/A    XRAYS (IMAGES & REPORTS) Internal 05/12/2022 RT knee

## 2022-06-20 ENCOUNTER — OFFICE VISIT (OUTPATIENT)
Dept: ORTHOPEDICS | Facility: CLINIC | Age: 57
End: 2022-06-20
Attending: PHYSICIAN ASSISTANT
Payer: COMMERCIAL

## 2022-06-20 ENCOUNTER — PRE VISIT (OUTPATIENT)
Dept: ORTHOPEDICS | Facility: CLINIC | Age: 57
End: 2022-06-20
Payer: COMMERCIAL

## 2022-06-20 VITALS — BODY MASS INDEX: 28.35 KG/M2 | WEIGHT: 160 LBS | HEIGHT: 63 IN

## 2022-06-20 DIAGNOSIS — M17.11 LOCALIZED OSTEOARTHRITIS OF RIGHT KNEE: Primary | ICD-10-CM

## 2022-06-20 DIAGNOSIS — G89.29 CHRONIC PAIN OF RIGHT KNEE: ICD-10-CM

## 2022-06-20 DIAGNOSIS — M25.561 CHRONIC PAIN OF RIGHT KNEE: ICD-10-CM

## 2022-06-20 PROCEDURE — 99203 OFFICE O/P NEW LOW 30 MIN: CPT | Mod: 25 | Performed by: FAMILY MEDICINE

## 2022-06-20 PROCEDURE — 20610 DRAIN/INJ JOINT/BURSA W/O US: CPT | Mod: RT | Performed by: FAMILY MEDICINE

## 2022-06-20 RX ORDER — TRIAMCINOLONE ACETONIDE 40 MG/ML
40 INJECTION, SUSPENSION INTRA-ARTICULAR; INTRAMUSCULAR
Status: DISCONTINUED | OUTPATIENT
Start: 2022-06-20 | End: 2022-11-16

## 2022-06-20 RX ADMIN — TRIAMCINOLONE ACETONIDE 40 MG: 40 INJECTION, SUSPENSION INTRA-ARTICULAR; INTRAMUSCULAR at 11:49

## 2022-06-20 ASSESSMENT — PAIN SCALES - GENERAL: PAINLEVEL: MILD PAIN (3)

## 2022-06-20 NOTE — NURSING NOTE
"Chief Complaint   Patient presents with     Right Knee - New Patient     Right knee       Vitals:    06/20/22 1116   Weight: 72.6 kg (160 lb)   Height: 1.588 m (5' 2.5\")       Body mass index is 28.8 kg/m .      PJ ChangT                      "

## 2022-06-20 NOTE — PROGRESS NOTES
CHIEF COMPLAINT:  New Patient of the Right Knee (Right knee)       HISTORY OF PRESENT ILLNESS  Ms. Jeffries is a pleasant 56 year old female who presents to clinic today with right knee pain.  Doris has been experiencing right knee pain over the course of the past 6 months or so.  She cannot recall an inciting event.  She points to the medial portion of her knee, although pain radiates up and down.  This is definitely worse when she walks, especially stairs.  She feels as if her knee is going to buckle at certain times.  She was seen by her primary care office and had an x-ray and an MRI.  She has been icing and using analgesics, these are intermittently helpful.        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 Apply sparingly to affected area.  Do not apply to face. 45 g 1     lisinopril (ZESTRIL) 20 MG tablet Take 1 tablet (20 mg) by mouth daily 90 tablet 3     lisinopril (ZESTRIL) 40 MG tablet Take 1 tablet (40 mg) by mouth daily 90 tablet 0       Allergies   Allergen Reactions     No Known Drug Allergy        Family History   Problem Relation Age of Onset     Heart Disease Mother         defibrillator      "Cardiovascular Mother         defibrillator     Diabetes Mother      Hyperlipidemia Mother      Cerebrovascular Disease Mother      Anesthesia Reaction Mother      Cardiovascular Father         valve replacement     Hypertension Father      Eye Disorder Father         maccular degeneration     Heart Disease Father         valve replacement     Lipids Father      Hyperlipidemia Father      Genitourinary Problems Brother         CKD     Diabetes Brother      Cancer Brother         leukemia     Other Cancer Brother         Lukemia     Breast Cancer Niece      Other Cancer Brother         Skin     Asthma No family hx of      C.A.D. No family hx of      Cancer - colorectal No family hx of      Prostate Cancer No family hx of      Alzheimer Disease No family hx of      Arthritis No family hx of      Blood Disease No family hx of      Circulatory No family hx of      Gastrointestinal Disease No family hx of      Musculoskeletal Disorder No family hx of      Neurologic Disorder No family hx of      Respiratory No family hx of      Thyroid Disease No family hx of      Alcohol/Drug No family hx of      Allergies No family hx of      Congenital Anomalies No family hx of      Connective Tissue Disorder No family hx of      Depression No family hx of      Anxiety Disorder No family hx of      Mental Illness No family hx of      Substance Abuse No family hx of      Osteoporosis No family hx of      Genetic Disorder No family hx of      Obesity No family hx of        Additional medical/Social/Surgical histories reviewed in [facility] and updated as appropriate.        PHYSICAL EXAM  Ht 1.588 m (5' 2.5\")   Wt 72.6 kg (160 lb)   LMP  (LMP Unknown)   BMI 28.80 kg/m    General  - normal appearance, in no obvious distress  HEENT  - conjunctivae not injected, moist mucous membranes  CV  - normal popliteal pulse  Pulm  - normal respiratory pattern, non-labored  Musculoskeletal - right knee  - stance: mildly antalgic gait  - inspection: " generalized swelling  - palpation: medial > lateral joint line tenderness  - ROM: 120 degrees flexion, 0 degrees extension, painful active ROM  - strength: 5/5 in flexion, 5/5 in extension  - special tests:  (-) Lachman  (-) Mazin  (-) varus at 0 and 30 degrees flexion  (-) valgus at 0 and 30 degrees flexion  Neuro  - no sensory or motor deficit, grossly normal coordination, normal muscle tone  Skin  - no ecchymosis, erythema, warmth, or induration, no obvious rash  Psych  - interactive, appropriate, normal mood and affect                 ASSESSMENT & PLAN  Ms. Jeffries is a 56 year old female who presents to clinic today with right knee pain.    I reviewed her x-ray and her MR in the room with her.  She does have cartilage loss that is most severe in the medial compartment, she also has a medial meniscus tear.    We had a good discussion centering around her imaging findings and her symptoms.  We discussed that in the presence of osteoarthritis, surgery for a meniscus tear is not always helpful or indicated.  We did discuss that given her level of pain she very well may benefit from a injection with physical therapy to follow, which we did perform today (see procedure note).    If her symptoms are persistent despite her injection she should call, if this is the case I may consider referring her to our surgical partners.    It was a pleasure seeing Doris today.    Donato Dorsey DO, [facility]  Primary Care Sports Medicine      This note was constructed using Dragon dictation software, please excuse any minor errors in spelling, grammar, or syntax.      Large Joint Injection/Arthocentesis: R knee joint    Date/Time: 6/20/2022 11:49 AM  Performed by: Donato Dorsey DO  Authorized by: Donato Dorsey DO     Indications:  Pain  Needle Size:  22 G  Guidance: landmark guided    Location:  Knee      Medications:  40 mg triamcinolone 40 MG/ML  Outcome:  Tolerated well, no immediate complications  Consent Given by:   Patient  Timeout: timeout called immediately prior to procedure    Prep: patient was prepped and draped in usual sterile fashion       PROCEDURE    Knee Injection - Intraarticular  The patient was informed of the risks and the benefits of the procedure and a written consent was signed.  The patient s right knee was prepped with chlorhexidine in sterile fashion.   40 mg of triamcinolone suspension was drawn up into a 5 mL syringe with 4 mL of 1% lidocaine.  Injection was performed using substerile technique.  A 1.5-inch 22-gauge needle was used to enter the lateral aspect of the knee.  Injection performed successfully without difficulty.  There were no complications. The patient tolerated the procedure well. There was negligible bleeding.

## 2022-06-20 NOTE — LETTER
6/20/2022         RE: Gabriella Jeffries  [address] [place] [address]        Dear Colleague,    Thank you for referring your patient, Gabriella Jeffries, to the [facility] [place]. Please see a copy of my visit note below.    CHIEF COMPLAINT:  New Patient of the Right Knee (Right knee)       HISTORY OF PRESENT ILLNESS  Ms. Jeffries is a pleasant 56 year old female who presents to clinic today with right knee pain.  Doris has been experiencing right knee pain over the course of the past 6 months or so.  She cannot recall an inciting event.  She points to the medial portion of her knee, although pain radiates up and down.  This is definitely worse when she walks, especially stairs.  She feels as if her knee is going to buckle at certain times.  She was seen by her primary care office and had an x-ray and an MRI.  She has been icing and using analgesics, these are intermittently helpful.        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 Apply sparingly to affected area.  Do not apply to face. 45 g 1     lisinopril (ZESTRIL) 20 MG tablet Take 1 tablet (20 mg) by mouth daily 90 tablet  "3     lisinopril (ZESTRIL) 40 MG tablet Take 1 tablet (40 mg) by mouth daily 90 tablet 0       Allergies   Allergen Reactions     No Known Drug Allergy        Family History   Problem Relation Age of Onset     Heart Disease Mother         defibrillator     Cardiovascular Mother         defibrillator     Diabetes Mother      Hyperlipidemia Mother      Cerebrovascular Disease Mother      Anesthesia Reaction Mother      Cardiovascular Father         valve replacement     Hypertension Father      Eye Disorder Father         maccular degeneration     Heart Disease Father         valve replacement     Lipids Father      Hyperlipidemia Father      Genitourinary Problems Brother         CKD     Diabetes Brother      Cancer Brother         leukemia     Other Cancer Brother         Lukemia     Breast Cancer Niece      Other Cancer Brother         Skin     Asthma No family hx of      C.A.D. No family hx of      Cancer - colorectal No family hx of      Prostate Cancer No family hx of      Alzheimer Disease No family hx of      Arthritis No family hx of      Blood Disease No family hx of      Circulatory No family hx of      Gastrointestinal Disease No family hx of      Musculoskeletal Disorder No family hx of      Neurologic Disorder No family hx of      Respiratory No family hx of      Thyroid Disease No family hx of      Alcohol/Drug No family hx of      Allergies No family hx of      Congenital Anomalies No family hx of      Connective Tissue Disorder No family hx of      Depression No family hx of      Anxiety Disorder No family hx of      Mental Illness No family hx of      Substance Abuse No family hx of      Osteoporosis No family hx of      Genetic Disorder No family hx of      Obesity No family hx of        Additional medical/Social/Surgical histories reviewed in [facility] and updated as appropriate.        PHYSICAL EXAM  Ht 1.588 m (5' 2.5\")   Wt 72.6 kg (160 lb)   LMP  (LMP Unknown)   BMI 28.80 kg/m    General  - " normal appearance, in no obvious distress  HEENT  - conjunctivae not injected, moist mucous membranes  CV  - normal popliteal pulse  Pulm  - normal respiratory pattern, non-labored  Musculoskeletal - right knee  - stance: mildly antalgic gait  - inspection: generalized swelling  - palpation: medial > lateral joint line tenderness  - ROM: 120 degrees flexion, 0 degrees extension, painful active ROM  - strength: 5/5 in flexion, 5/5 in extension  - special tests:  (-) Lachman  (-) Mazin  (-) varus at 0 and 30 degrees flexion  (-) valgus at 0 and 30 degrees flexion  Neuro  - no sensory or motor deficit, grossly normal coordination, normal muscle tone  Skin  - no ecchymosis, erythema, warmth, or induration, no obvious rash  Psych  - interactive, appropriate, normal mood and affect                 ASSESSMENT & PLAN  Ms. Jeffries is a 56 year old female who presents to clinic today with right knee pain.    I reviewed her x-ray and her MR in the room with her.  She does have cartilage loss that is most severe in the medial compartment, she also has a medial meniscus tear.    We had a good discussion centering around her imaging findings and her symptoms.  We discussed that in the presence of osteoarthritis, surgery for a meniscus tear is not always helpful or indicated.  We did discuss that given her level of pain she very well may benefit from a injection with physical therapy to follow, which we did perform today (see procedure note).    If her symptoms are persistent despite her injection she should call, if this is the case I may consider referring her to our surgical partners.    It was a pleasure seeing Doris today.    Donato Dorsey DO, [facility]  Primary Care Sports Medicine      This note was constructed using Dragon dictation software, please excuse any minor errors in spelling, grammar, or syntax.      Large Joint Injection/Arthocentesis: R knee joint    Date/Time: 6/20/2022 11:49 AM  Performed by: Donato Dorsey,    Authorized by: Donato Dorsey DO     Indications:  Pain  Needle Size:  22 G  Guidance: landmark guided    Location:  Knee      Medications:  40 mg triamcinolone 40 MG/ML  Outcome:  Tolerated well, no immediate complications  Consent Given by:  Patient  Timeout: timeout called immediately prior to procedure    Prep: patient was prepped and draped in usual sterile fashion       PROCEDURE    Knee Injection - Intraarticular  The patient was informed of the risks and the benefits of the procedure and a written consent was signed.  The patient s right knee was prepped with chlorhexidine in sterile fashion.   40 mg of triamcinolone suspension was drawn up into a 5 mL syringe with 4 mL of 1% lidocaine.  Injection was performed using substerile technique.  A 1.5-inch 22-gauge needle was used to enter the lateral aspect of the knee.  Injection performed successfully without difficulty.  There were no complications. The patient tolerated the procedure well. There was negligible bleeding.                   Again, thank you for allowing me to participate in the care of your patient.        Sincerely,        Donato Dorsey DO

## 2022-06-20 NOTE — NURSING NOTE
[facility]   ORTHOPEDICS & SPORTS MEDICINE  [address] [place] [address]  Dept: [phone]  ______________________________________________________________________________    Patient: Gabriella Jeffries   : 1965   MRN: [id number]   2022    INVASIVE PROCEDURE SAFETY CHECKLIST    Date: 2022    Procedure:Right knee Kenalog injection  Patient Name: Gabriella Jeffries  MRN: [id number]  YOB: 1965    Action: Complete sections as appropriate. Any discrepancy results in a HARD COPY until resolved.     PRE PROCEDURE:  Patient ID verified with 2 identifiers (name and  or MRN): Yes  Procedure and site verified with patient/designee (when able): Yes  Accurate consent documentation in medical record: Yes  H&P (or appropriate assessment) documented in medical record: Yes  H&P must be up to 20 days prior to procedure and updates within 24 hours of procedure as applicable: NA  Relevant diagnostic and radiology test results appropriately labeled and displayed as applicable: NA  Procedure site(s) marked with provider initials: Yes    TIMEOUT:  Time-Out performed immediately prior to starting procedure, including verbal and active participation of all team members addressing the following:Yes  * Correct patient identify  * Confirmed that the correct side and site are marked  * An accurate procedure consent form  * Agreement on the procedure to be done  * Correct patient position  * Relevant images and results are properly labeled and appropriately displayed  * The need to administer antibiotics or fluids for irrigation purposes during the procedure as applicable   * Safety precautions based on patient history or medication use    DURING PROCEDURE: Verification of correct person, site, and procedures any time the responsibility for care of the patient is transferred to another member of the care team.       Prior to injection, verified patient identity using patient's name and date  of birth.  Due to injection administration, patient instructed to remain in clinic for 15 minutes  afterwards, and to report any adverse reaction to me immediately.    Joint injection was performed.      Drug Amount Wasted:  None.  Vial/Syringe: Single dose vial  Expiration Date:  2/29/2024      Bernardo Hernandez, EMT  June 20, 2022

## 2022-07-03 ENCOUNTER — HEALTH MAINTENANCE LETTER (OUTPATIENT)
Age: 57
End: 2022-07-03

## 2022-08-16 DIAGNOSIS — I10 BENIGN ESSENTIAL HYPERTENSION: ICD-10-CM

## 2022-08-17 RX ORDER — LISINOPRIL 40 MG/1
TABLET ORAL
Qty: 90 TABLET | Refills: 0 | Status: SHIPPED | OUTPATIENT
Start: 2022-08-17 | End: 2022-11-16

## 2022-08-17 NOTE — TELEPHONE ENCOUNTER
"Pending Prescriptions:                       Disp   Refills    lisinopril (ZESTRIL) 40 MG tablet [Pharmac*90 tab*0        Sig: TAKE 1 TABLET(40 MG) BY MOUTH DAILY        Routing refill request to provider for review/approval because:    ACE Inhibitors (Including Combos) Protocol Failed    Rerun Protocol (8/16/2022 12:08 PM)      Normal serum creatinine on file in past 12 months        Recent Labs   Lab Test 06/02/21  1811   CR 0.80      Ok to refill medication if creatinine is low      Normal serum potassium on file in past 12 months        Recent Labs   Lab Test 06/02/21  1811   POTASSIUM 4.1         Blood pressure under 140/90 in past 12 months        BP Readings from Last 3 Encounters:   05/11/22 138/86   06/02/21 134/84   01/06/21 (!) 166/102             Recent (12 mo) or future (30 days) visit within the authorizing provider's specialty    Patient has had an office visit with the authorizing provider or a provider within the authorizing providers department within the previous 12 mos or has a future within next 30 days. See \"Patient Info\" tab in inbasket, or \"Choose Columns\" in Meds & Orders section of the refill encounter.           Medication is active on med list          Patient is age 18 or older          No active pregnancy on record          No positive pregnancy test within past 12 months        "

## 2022-11-15 ASSESSMENT — ENCOUNTER SYMPTOMS
BREAST MASS: 0
CHILLS: 0
ARTHRALGIAS: 1
DYSURIA: 0
FEVER: 0
ABDOMINAL PAIN: 0
FREQUENCY: 1
HEADACHES: 1
PARESTHESIAS: 0
HEARTBURN: 1
SHORTNESS OF BREATH: 0
SORE THROAT: 0
COUGH: 0
PALPITATIONS: 0
EYE PAIN: 0
DIARRHEA: 0
WEAKNESS: 0
HEMATOCHEZIA: 0
MYALGIAS: 0
CONSTIPATION: 0
DIZZINESS: 1
NAUSEA: 0
JOINT SWELLING: 0
HEMATURIA: 0
NERVOUS/ANXIOUS: 0

## 2022-11-16 ENCOUNTER — OFFICE VISIT (OUTPATIENT)
Dept: FAMILY MEDICINE | Facility: CLINIC | Age: 57
End: 2022-11-16
Payer: COMMERCIAL

## 2022-11-16 VITALS
SYSTOLIC BLOOD PRESSURE: 132 MMHG | HEIGHT: 63 IN | DIASTOLIC BLOOD PRESSURE: 74 MMHG | OXYGEN SATURATION: 100 % | TEMPERATURE: 98.6 F | RESPIRATION RATE: 18 BRPM | HEART RATE: 66 BPM | BODY MASS INDEX: 30.48 KG/M2 | WEIGHT: 172 LBS

## 2022-11-16 DIAGNOSIS — I10 BENIGN ESSENTIAL HYPERTENSION: ICD-10-CM

## 2022-11-16 DIAGNOSIS — L40.9 PSORIASIS: ICD-10-CM

## 2022-11-16 DIAGNOSIS — Z13.220 LIPID SCREENING: ICD-10-CM

## 2022-11-16 DIAGNOSIS — K21.9 GASTROESOPHAGEAL REFLUX DISEASE, UNSPECIFIED WHETHER ESOPHAGITIS PRESENT: ICD-10-CM

## 2022-11-16 DIAGNOSIS — Z00.00 ROUTINE GENERAL MEDICAL EXAMINATION AT A HEALTH CARE FACILITY: Primary | ICD-10-CM

## 2022-11-16 DIAGNOSIS — R14.0 ABDOMINAL BLOATING: ICD-10-CM

## 2022-11-16 DIAGNOSIS — Z86.0100 HISTORY OF COLONIC POLYPS: ICD-10-CM

## 2022-11-16 LAB
BASOPHILS # BLD AUTO: 0 10E3/UL (ref 0–0.2)
BASOPHILS NFR BLD AUTO: 1 %
EOSINOPHIL # BLD AUTO: 0.1 10E3/UL (ref 0–0.7)
EOSINOPHIL NFR BLD AUTO: 1 %
ERYTHROCYTE [DISTWIDTH] IN BLOOD BY AUTOMATED COUNT: 13.2 % (ref 10–15)
HCT VFR BLD AUTO: 41 % (ref 35–47)
HGB BLD-MCNC: 13.5 G/DL (ref 11.7–15.7)
IMM GRANULOCYTES # BLD: 0 10E3/UL
IMM GRANULOCYTES NFR BLD: 0 %
LYMPHOCYTES # BLD AUTO: 2 10E3/UL (ref 0.8–5.3)
LYMPHOCYTES NFR BLD AUTO: 23 %
MCH RBC QN AUTO: 30.1 PG (ref 26.5–33)
MCHC RBC AUTO-ENTMCNC: 32.9 G/DL (ref 31.5–36.5)
MCV RBC AUTO: 92 FL (ref 78–100)
MONOCYTES # BLD AUTO: 0.6 10E3/UL (ref 0–1.3)
MONOCYTES NFR BLD AUTO: 6 %
NEUTROPHILS # BLD AUTO: 6.2 10E3/UL (ref 1.6–8.3)
NEUTROPHILS NFR BLD AUTO: 69 %
PLATELET # BLD AUTO: 241 10E3/UL (ref 150–450)
RBC # BLD AUTO: 4.48 10E6/UL (ref 3.8–5.2)
WBC # BLD AUTO: 8.9 10E3/UL (ref 4–11)

## 2022-11-16 PROCEDURE — 80050 GENERAL HEALTH PANEL: CPT | Performed by: FAMILY MEDICINE

## 2022-11-16 PROCEDURE — 36416 COLLJ CAPILLARY BLOOD SPEC: CPT | Performed by: FAMILY MEDICINE

## 2022-11-16 PROCEDURE — 99214 OFFICE O/P EST MOD 30 MIN: CPT | Mod: 25 | Performed by: FAMILY MEDICINE

## 2022-11-16 PROCEDURE — 90471 IMMUNIZATION ADMIN: CPT | Performed by: FAMILY MEDICINE

## 2022-11-16 PROCEDURE — 80061 LIPID PANEL: CPT | Performed by: FAMILY MEDICINE

## 2022-11-16 PROCEDURE — 90682 RIV4 VACC RECOMBINANT DNA IM: CPT | Performed by: FAMILY MEDICINE

## 2022-11-16 PROCEDURE — 99396 PREV VISIT EST AGE 40-64: CPT | Mod: 25 | Performed by: FAMILY MEDICINE

## 2022-11-16 RX ORDER — LISINOPRIL 40 MG/1
40 TABLET ORAL DAILY
Qty: 90 TABLET | Refills: 3 | Status: SHIPPED | OUTPATIENT
Start: 2022-11-16 | End: 2024-01-11

## 2022-11-16 RX ORDER — LISINOPRIL 20 MG/1
20 TABLET ORAL DAILY
Qty: 90 TABLET | Refills: 3 | Status: CANCELLED | OUTPATIENT
Start: 2022-11-16

## 2022-11-16 RX ORDER — FAMOTIDINE 40 MG/1
40 TABLET, FILM COATED ORAL DAILY
Qty: 30 TABLET | Refills: 1 | Status: SHIPPED | OUTPATIENT
Start: 2022-11-16 | End: 2022-11-17

## 2022-11-16 RX ORDER — CLOBETASOL PROPIONATE 0.5 MG/G
OINTMENT TOPICAL
Qty: 45 G | Refills: 1 | Status: SHIPPED | OUTPATIENT
Start: 2022-11-16

## 2022-11-16 ASSESSMENT — ENCOUNTER SYMPTOMS
HEADACHES: 1
SORE THROAT: 0
ARTHRALGIAS: 1
JOINT SWELLING: 0
SHORTNESS OF BREATH: 0
EYE PAIN: 0
HEMATURIA: 0
PALPITATIONS: 0
ABDOMINAL PAIN: 0
COUGH: 0
DIZZINESS: 1
HEMATOCHEZIA: 0
DIARRHEA: 0
WEAKNESS: 0
BREAST MASS: 0
HEARTBURN: 1
NERVOUS/ANXIOUS: 0
FEVER: 0
CHILLS: 0
DYSURIA: 0
CONSTIPATION: 0
FREQUENCY: 1
NAUSEA: 0
MYALGIAS: 0
PARESTHESIAS: 0

## 2022-11-16 ASSESSMENT — PAIN SCALES - GENERAL: PAINLEVEL: NO PAIN (0)

## 2022-11-16 ASSESSMENT — PATIENT HEALTH QUESTIONNAIRE - PHQ9
SUM OF ALL RESPONSES TO PHQ QUESTIONS 1-9: 1
10. IF YOU CHECKED OFF ANY PROBLEMS, HOW DIFFICULT HAVE THESE PROBLEMS MADE IT FOR YOU TO DO YOUR WORK, TAKE CARE OF THINGS AT HOME, OR GET ALONG WITH OTHER PEOPLE: NOT DIFFICULT AT ALL
SUM OF ALL RESPONSES TO PHQ QUESTIONS 1-9: 1

## 2022-11-16 NOTE — PROGRESS NOTES
"   SUBJECTIVE:   CC: Gabriella is an 57 year old who presents for preventive health visit.     Patient has been advised of split billing requirements and indicates understanding: Yes  Healthy Habits:     Getting at least 3 servings of Calcium per day:  Yes    Bi-annual eye exam:  Yes    Dental care twice a year:  Yes    Sleep apnea or symptoms of sleep apnea:  Daytime drowsiness    Diet:  Regular (no restrictions)    Frequency of exercise:  None    Taking medications regularly:  Yes    Medication side effects:  Not applicable    PHQ-2 Total Score: 0      Abdominal bloating. For the last few months. BMs regularly.  In looking back in the chart she does have issues with this off and on.  She has had prior colonoscopy which was normal but that was 5 years ago.  She is due for recheck at this time.  She has noticed a little heartburn.  She notices that at night when she is lying down will have coughing related to that.  She is not taking anything for the heartburn.  She is drinking \"lots of coffee\" every day.    Felt shaky and lightheaded last night. Feels ok today.     Hypertension Follow-up      Do you check your blood pressure regularly outside of the clinic? Yes     Are you following a low salt diet? Yes    Are your blood pressures ever more than 140 on the top number (systolic) OR more   than 90 on the bottom number (diastolic), for example 140/90? Yes       Today's PHQ-2 Score:   PHQ-2 ( 1999 [employer]) 11/15/2022   Q1: Little interest or pleasure in doing things 0   Q2: Feeling down, depressed or hopeless 0   PHQ-2 Score 0   PHQ-2 Total Score (12-17 Years)- Positive if 3 or more points; Administer PHQ-A if positive -   Q1: Little interest or pleasure in doing things Not at all   Q2: Feeling down, depressed or hopeless Not at all   PHQ-2 Score 0           Social History     Tobacco Use     Smoking status: Never     Smokeless tobacco: Never   Substance Use Topics     Alcohol use: Yes     Comment: Seldom         Alcohol " Use 11/15/2022   Prescreen: >3 drinks/day or >7 drinks/week? No   Prescreen: >3 drinks/day or >7 drinks/week? -       Reviewed orders with patient.  Reviewed health maintenance and updated orders accordingly - Yes  BP Readings from Last 3 Encounters:   11/16/22 132/74   05/11/22 138/86   06/02/21 134/84    Wt Readings from Last 3 Encounters:   11/16/22 78 kg (172 lb)   06/20/22 72.6 kg (160 lb)   05/11/22 76.5 kg (168 lb 11.2 oz)                    Breast Cancer Screening:    FHS-7:   Breast CA Risk Assessment (FHS-7) 6/1/2021 6/6/2022 11/15/2022   Did any of your first-degree relatives have breast or ovarian cancer? No No No   Did any of your relatives have bilateral breast cancer? No No No   Did any man in your family have breast cancer? No No No   Did any woman in your family have breast and ovarian cancer? Yes No Yes   Did any woman in your family have breast cancer before age 50 y? Yes Yes Yes   Do you have 2 or more relatives with breast and/or ovarian cancer? No No No   Do you have 2 or more relatives with breast and/or bowel cancer? No No No       Mammogram Screening: Recommended annual mammography  Pertinent mammograms are reviewed under the imaging tab.    History of abnormal Pap smear: Status post benign hysterectomy. Health Maintenance and Surgical History updated.     Reviewed and updated as needed this visit by clinical staff   Tobacco  Allergies  Meds              Reviewed and updated as needed this visit by Provider                     Review of Systems   Constitutional: Negative for chills and fever.   HENT: Negative for congestion, ear pain, hearing loss and sore throat.    Eyes: Positive for visual disturbance. Negative for pain.   Respiratory: Negative for cough and shortness of breath.    Cardiovascular: Negative for chest pain, palpitations and peripheral edema.   Gastrointestinal: Positive for heartburn. Negative for abdominal pain, constipation, diarrhea, hematochezia and nausea.  "  Breasts:  Negative for tenderness, breast mass and discharge.   Genitourinary: Positive for frequency. Negative for dysuria, genital sores, hematuria, pelvic pain, urgency, vaginal bleeding and vaginal discharge.   Musculoskeletal: Positive for arthralgias. Negative for joint swelling and myalgias.   Skin: Negative for rash.   Neurological: Positive for dizziness and headaches. Negative for weakness and paresthesias.   Psychiatric/Behavioral: Positive for mood changes. The patient is not nervous/anxious.           OBJECTIVE:   /74   Pulse 66   Temp 98.6  F (37  C) (Temporal)   Resp 18   Ht 1.592 m (5' 2.68\")   Wt 78 kg (172 lb)   LMP  (LMP Unknown)   SpO2 100%   Breastfeeding No   BMI 30.78 kg/m    Physical Exam  GENERAL APPEARANCE: healthy, alert and no distress  EYES: Eyes grossly normal to inspection, PERRL and conjunctivae and sclerae normal  HENT: ear canals and TM's normal, nose and mouth without ulcers or lesions, oropharynx clear and oral mucous membranes moist  NECK: no adenopathy, no asymmetry, masses, or scars and thyroid normal to palpation  RESP: lungs clear to auscultation - no rales, rhonchi or wheezes  BREAST: normal without masses, tenderness or nipple discharge and no palpable axillary masses or adenopathy  CV: regular rate and rhythm, normal S1 S2, no S3 or S4, no murmur, click or rub, no peripheral edema and peripheral pulses strong  ABDOMEN: Normal bowel sounds.  Soft.  Tenderness left lower quadrant moderate.  No rebound or guarding no hepatosplenomegaly.  MS: no musculoskeletal defects are noted and gait is age appropriate without ataxia  SKIN: no suspicious lesions or rashes  NEURO: Normal strength and tone, sensory exam grossly normal, mentation intact and speech normal  PSYCH: mentation appears normal and affect normal/bright    Diagnostic Test Results:  Labs reviewed in Epic    ASSESSMENT/PLAN:   (Z00.00) Routine general medical examination at a health care facility  " (primary encounter diagnosis)  Comment: See counseling messages  Plan: Recheck in 1 year    (I10) Benign essential hypertension  Comment: Doing well. Well controlled. Tolerating medication.  No change in plan.    Plan: lisinopril (ZESTRIL) 40 MG tablet            (K21.9) Gastroesophageal reflux disease, unspecified whether esophagitis present  Comment: Patient with symptoms of reflux.  She is having a lot of bloating and irritation as well as issues at night and cough.  Recommend starting on Pepcid 40 mg daily and see if this allows for improvement.  If not fully resolved or improved with this will consider omeprazole instead.  Discussed how caffeine and coffee contribute to the reflux symptoms and encouraged her to reduce significantly.  Plan: famotidine (PEPCID) 40 MG tablet        Recheck if not having improvement in the next few weeks.    (L40.9) Psoriasis  Comment: Patient needs refill of the cream.  She would like to return back to dermatology.  The does not think she needs a referral and believes she has information at home.  She will reach out if she cannot find that information.  Plan: clobetasol (TEMOVATE) 0.05 % external ointment            (Z13.220) Lipid screening  Comment: due for labs.   Plan: Lipid panel reflex to direct LDL Fasting        Will notify of results.     (R14.0) Abdominal bloating  Comment: See above in regards to plan for reflux.  Will also check labs as noted to evaluate for any other cause of her abdominal bloating.  She is due for her recheck of her screening colonoscopy and orders are placed as below.  Plan: CBC with platelets and differential,         Comprehensive metabolic panel (BMP + Alb, Alk         Phos, ALT, AST, Total. Bili, TP), TSH with free        T4 reflex            (Z86.010) History of colonic polyps  Comment: Due for recheck.  Order placed.  Plan: Colonoscopy Screening  Referral              Patient has been advised of split billing requirements and  "indicates understanding: Yes      COUNSELING:  Reviewed preventive health counseling, as reflected in patient instructions       Regular exercise       Healthy diet/nutrition      BMI:   Estimated body mass index is 30.78 kg/m  as calculated from the following:    Height as of this encounter: 1.592 m (5' 2.68\").    Weight as of this encounter: 78 kg (172 lb).   Weight management plan: Discussed healthy diet and exercise guidelines      She reports that she has never smoked. She has never used smokeless tobacco.          Erika Delgado MD  [facility]  Answers for HPI/ROS submitted by the patient on 11/16/2022  If you checked off any problems, how difficult have these problems made it for you to do your work, take care of things at home, or get along with other people?: Not difficult at all  PHQ9 TOTAL SCORE: 1      "

## 2022-11-17 LAB
ALBUMIN SERPL-MCNC: 4.2 G/DL (ref 3.4–5)
ALP SERPL-CCNC: 87 U/L (ref 40–150)
ALT SERPL W P-5'-P-CCNC: 36 U/L (ref 0–50)
ANION GAP SERPL CALCULATED.3IONS-SCNC: 9 MMOL/L (ref 3–14)
AST SERPL W P-5'-P-CCNC: 21 U/L (ref 0–45)
BILIRUB SERPL-MCNC: 0.3 MG/DL (ref 0.2–1.3)
BUN SERPL-MCNC: 11 MG/DL (ref 7–30)
CALCIUM SERPL-MCNC: 9.1 MG/DL (ref 8.5–10.1)
CHLORIDE BLD-SCNC: 110 MMOL/L (ref 94–109)
CHOLEST SERPL-MCNC: 260 MG/DL
CO2 SERPL-SCNC: 21 MMOL/L (ref 20–32)
CREAT SERPL-MCNC: 0.71 MG/DL (ref 0.52–1.04)
FASTING STATUS PATIENT QL REPORTED: YES
GFR SERPL CREATININE-BSD FRML MDRD: >90 ML/MIN/1.73M2
GLUCOSE BLD-MCNC: 99 MG/DL (ref 70–99)
HDLC SERPL-MCNC: 73 MG/DL
LDLC SERPL CALC-MCNC: 164 MG/DL
NONHDLC SERPL-MCNC: 187 MG/DL
POTASSIUM BLD-SCNC: 3.6 MMOL/L (ref 3.4–5.3)
PROT SERPL-MCNC: 7.7 G/DL (ref 6.8–8.8)
SODIUM SERPL-SCNC: 140 MMOL/L (ref 133–144)
TRIGL SERPL-MCNC: 117 MG/DL
TSH SERPL DL<=0.005 MIU/L-ACNC: 1.2 MU/L (ref 0.4–4)

## 2022-12-08 ENCOUNTER — E-VISIT (OUTPATIENT)
Dept: FAMILY MEDICINE | Facility: CLINIC | Age: 57
End: 2022-12-08
Payer: COMMERCIAL

## 2022-12-08 DIAGNOSIS — J01.90 ACUTE SINUSITIS WITH SYMPTOMS > 10 DAYS: Primary | ICD-10-CM

## 2022-12-08 PROCEDURE — 99421 OL DIG E/M SVC 5-10 MIN: CPT | Performed by: PHYSICIAN ASSISTANT

## 2022-12-13 NOTE — PATIENT INSTRUCTIONS
When to Use Antibiotics    Antibiotics are medicines used to treat infections caused by bacteria. They don t work for an illness caused by a virus. And they don't work for an allergic reaction. In fact, taking antibiotics for reasons other than an infection by bacteria can cause problems. You may have side effects from the medicine. And if you need an antibiotic in the future, it may not work well. This is because the bacteria can become immune to the medicine. You can also get a type of diarrhea that's hard to treat. This diarrhea is called C. diff.   When antibiotics likely won t help  Your healthcare provider won t usually give you antibiotics for the conditions listed below. You can help by not asking for them if you have:     A cold. This type of illness is caused by a virus. It can cause a runny nose, stuffed-up nose, sneezing, coughing, and headache. You may also have mild body aches and low fever. A cold gets better on its own in a few days to a week.    The flu (influenza). This is a respiratory illness caused by a virus. The flu usually goes away on its own in a week or so. It can cause fever, body aches, sore throat, and tiredness.    Bronchitis. This is an infection in the lungs. It is most often caused by a virus. You may have coughing, phlegm, body aches, and a low fever. A common type of bronchitis is known as a chest cold. This is called acute bronchitis. This often happens after you have a respiratory infection like a cold. Bronchitis can take weeks to go away. Antibiotics often don t help.    Most sore throats. Sore throats are most often caused by viruses. Your throat may feel scratchy or achy. It may hurt to swallow. You may also have a low fever and body aches. A sore throat usually gets better in a few days.    Most outer ear infections. An ear infection may be caused by a virus or bacteria. It causes pain in the ear. Antibiotics by mouth usually don t help. Low-dose antibiotic ear drops  work much better.    Some inner ear infections. An inner ear infection (otitis media) can be caused by a virus in the ear. It can also cause pain and a high fever. Most older children with low-grade fever don't need to be treated with antibiotics.    Most sinus infections. This is also known as sinusitis. This kind of infection causes sinus pain and swelling, and a runny nose. In most cases, it goes away on its own. Antibiotics don t make recovery quicker.    Allergic rhinitis. This is a set of symptoms caused by an allergic reaction. You may have sneezing, a runny nose, itchy or watery eyes, or a sore throat. Allergies are not treated with antibiotics.    Low fever. A mild fever that s less than 100.4 F (38 C) most likely doesn t need to be treated with antibiotics.   When antibiotics can help  Antibiotics can be used to treat:                                                       Strep throat. This is a throat infection caused by a certain type of bacteria. Symptoms of strep throat include a sore throat, white patches on the tonsils, red spots on the roof of the mouth, fever, body aches, and nausea and vomiting. Strep throat almost never causes a cough.    Urinary tract infection (UTI). This is an infection of the bladder and the tube that takes urine out of the body. It is caused by bacteria. It can cause burning pain and urine that s cloudy or tinted with blood. UTIs are very common. Antibiotics usually help treat them.    Some outer ear infections. In some cases, a healthcare provider may prescribe antibiotics by mouth for an ear infection. You may need a test to show the cause of the ear infection.    Some sinus infections. In some cases, your healthcare provider may give you antibiotics. He or she may first need to make sure your symptoms aren t caused by something else. This may be a virus, fungus, allergies, or air pollutants such as smoke.   Your healthcare provider may give you antibiotics if you have a  condition that can affect your immune system. This includes diabetes or cancer.  Self-care at home  If your infection can t be treated with antibiotics, you can take other steps to feel better. Try the remedies below. In general:     Rest and sleep as much as needed.    Drink water and other clear fluids.    Don t smoke. Stay away from smoke from other people.    Use over-the-counter medicine such as acetaminophen or ibuprofen to ease pain or fever, as directed by your healthcare provider.  To treat sinus pain or nasal stuffiness:    Put a warm, moist cloth on your face where you feel sinus pain or pressure.    Try a nasal spray with medicine or saline. Use as directed by your healthcare provider.    Breathe in steam from a hot shower.    Use a humidifier or cool mist vaporizer.   To quiet a cough:     Use a humidifier or cool mist vaporizer.    Breathe in steam from a hot shower.    Suck on cough lozenges.   To sooth a sore throat:     Suck on ice chips, frozen ice pops, or lozenges.    Use a sore throat spray.    Use a humidifier or cool mist vaporizer.    Gargle with saltwater.    Drink warm liquids.    Take ibuprofen to reduce swelling and pain.  To ease ear pain:     Hold a warm, moist washcloth on the ear for 10 minutes at a time.  [employer] last reviewed this educational content on 12/1/2019 2000-2021 [employer]. All rights reserved. This information is not intended as a substitute for professional medical care. Always follow your healthcare professional's instructions.          Sinusitis (Antibiotic Treatment)    The sinuses are air-filled spaces within the bones of the face. They connect to the inside of the nose. Sinusitis is an inflammation of the tissue that lines the sinuses. Sinusitis can occur during a cold. It can also happen due to allergies to pollens and other particles in the air. Sinusitis can cause symptoms of sinus congestion and a feeling of fullness. A sinus infection causes  fever, headache, and facial pain. There is often green or yellow fluid draining from the nose or into the back of the throat (post-nasal drip). You have been given antibiotics to treat this condition.   Home care    Take the full course of antibiotics as instructed. Don't stop taking them, even when you feel better.    Drink plenty of water, hot tea, and other liquids as directed by the healthcare provider. This may help thin nasal mucus. It also may help your sinuses drain fluids.    Heat may help soothe painful areas of your face. Use a towel soaked in hot water. Or,  the shower and direct the warm spray onto your face. Using a vaporizer along with a menthol rub at night may also help soothe symptoms.     An expectorant with guaifenesin may help thin nasal mucus and help your sinuses drain fluids. Talk with your provider or pharmacists before taking an over-the-counter (OTC) medicine if you have any questions about it or its side effects..    You can use an OTC decongestant, unless a similar medicine was prescribed to you. Nasal sprays work the fastest. Use one that contains phenylephrine or oxymetazoline. First blow your nose gently. Then use the spray. Don't use these medicines more often than directed on the label. If you do, your symptoms may get worse. You may also take pills that contain pseudoephedrine. Don t use products that combine multiple medicines. This is because side effects may be increased. Read labels. You can also ask the pharmacist for help. (People with high blood pressure should not use decongestants. They can raise blood pressure.) Talk with your provider or pharmacist if you have any questions about the medicine..    OTC antihistamines may help if allergies contributed to your sinusitis. Talk with your provider or pharmacist if you have any questions about the medicine..    Don't use nasal rinses or irrigation during an acute sinus infection, unless your healthcare provider tells  you to. Rinsing may spread the infection to other areas in your sinuses.    Use acetaminophen or ibuprofen to control pain, unless another pain medicine was prescribed to you. If you have chronic liver or kidney disease or ever had a stomach ulcer, talk with your healthcare provider before using these medicines. Never give aspirin to anyone under age 18 who is ill with a fever. It may cause severe liver damage.    Don't smoke. This can make symptoms worse.    Follow-up care  Follow up with your healthcare provider, or as advised.   When to seek medical advice  Call your healthcare provider if any of these occur:     Facial pain or headache that gets worse    Stiff neck    Unusual drowsiness or confusion    Swelling of your forehead or eyelids    Symptoms don't go away in 10 days    Vision problems, such as blurred or double vision    Fever of 100.4 F (38 C) or higher, or as directed by your healthcare provider  Call 911  Call 911 if any of these occur:     Seizure    Trouble breathing    Feeling dizzy or faint    Fingernails, skin or lips look blue, purple , or gray  Prevention  Here are steps you can take to help prevent an infection:     Keep good hand washing habits.    Don t have close contact with people who have sore throats, colds, or other upper respiratory infections.    Don t smoke, and stay away from secondhand smoke.    Stay up to date with of your vaccines.  [employer] last reviewed this educational content on 12/1/2019 2000-2021 [employer]. All rights reserved. This information is not intended as a substitute for professional medical care. Always follow your healthcare professional's instructions.        Dear Gabriella Jeffries    After reviewing your responses, I've been able to diagnose you with Acute sinusitis with symptoms > 10 days.      Based on your responses and diagnosis, I have prescribed   Orders Placed This Encounter     amoxicillin-clavulanate (AUGMENTIN) 875-125 MG tablet     to treat your symptoms. I have sent this to your pharmacy.?     It is also important to stay well hydrated, get lots of rest and take over-the-counter decongestants,?tylenol?or ibuprofen if you?are able to?take those medications per your primary care provider to help relieve discomfort.?     It is important that you take?all of?your prescribed medication even if your symptoms are improving after a few doses.? Taking?all of?your medicine helps prevent the symptoms from returning.?     If your symptoms worsen, you develop severe headache, vomiting, high fever (>102), or are not improving in 7 days, please contact your primary care provider for an appointment or visit any of our convenient Walk-in Care or Urgent Care Centers to be seen which can be found on our website?here.?     Thanks again for choosing?us?as your health care partner,?   ?  Ethel Schmidt PA-C?

## 2022-12-14 ENCOUNTER — TELEPHONE (OUTPATIENT)
Dept: GASTROENTEROLOGY | Facility: CLINIC | Age: 57
End: 2022-12-14

## 2022-12-14 NOTE — TELEPHONE ENCOUNTER
Screening Questions    BlueKIND OF PREP RedLOCATION [review exclusion criteria] GreenSEDATION TYPE    N Have you had a positive covid test in the last 2 weeks?   If yes, what date? Schedule out 14 days from symptoms  Y Your able to give consent for your medical care?  Y Are you active on mychart?   PO What insurance do you carry?    MATILDA PAULA Ordering/Referring Provider:     31.5 BMI [BMI OVER 40-EXTENDED PREP]  BMI OVER 40 NEED PAC EVALUATION FOR [facility]    Respiratory Screening  [If yes to any of the following HOSPITAL setting only]     N      Do you use daily home oxygen?   N      Do you have mod to severe Obstructive Sleep Apnea?  N      Do you have Pulmonary Hypertension? NEED PAC APPT AT [facility]    N      Do you have UNCONTROLLED asthma?      N Have you had a heart or lung transplant?       N Are you currently on dialysis? [If yes, G-PREP & HOSPITAL setting only]   N  Do you have chronic kidney disease? [If yes, G-PREP]  N  Do you have a diagnosis of diabetes?[If yes, G-PREP]    N Have you had a stroke or Transient ischemic attack (TIA - aka  mini stroke ) within 6 months? (If yes, please review exclusion criteria)  N  In the past 6 months, have you had any heart related issues including cardiomyopathy or heart attack?   N  If yes, did it require cardiac stenting or other implantable device?       N Do you have any implantable devices in your body (pacemaker, defib, LVAD)? (If yes, please review exclusion criteria)    N Do you take nitroglycerin?   N If yes, how often?  (If yes, HOSPITAL setting ONLY)  N Are you currently taking any blood thinners?           [IF YES, INFORM PATIENT TO FOLLOW UP W/ ORDERING PROVIDER FOR BRIDGING INSTRUCTIONS]     N  Do you take Phentermine?   Yes-> Hold for 7 days before procedure.  Please consult your prescribing provider if you have questions about holding this  medication.         [FEMALES] are you currently pregnant?       If yes, how many weeks? [Greater than 12 weeks, OR NEEDED]    N Any prescription pain medications taken daily like narcotics? [EXTENDED PREP AND MAC]    N  Any chemical dependencies such as alcohol, street drugs, or methadone? [If yes, MAC]    N Any post-traumatic stress syndrome, severe anxiety or history of psychosis? [If yes, MAC]    N Do you need help transferring? (If NO, please HOSPITAL setting  only)     N  On a regular basis do you go 3-5 days without a bowel movement?[ If yes, EXTENDED PREP.]     Preferred LOCAL Pharmacy for Pre Prescription:      [facility] #[id number] - [place], [place] - [address]  ( [address])                        Scheduling Details    Gabriella Caller:   (Please ask for phone number if not scheduled by patient)    Type of Procedure Scheduled: Lower Endoscopy [Colonoscopy]      STANDARD [facility]-If you answer yes to questions #8, #20, #21 Which Colonoscopy Prep was Sent:   ALBER CF PATIENTS & PAULINA'S PATIENTS NEEDS EXTENDED PREP    2/17 Date of Procedure:   CHAI Surgeon:    Location:      Sedation Type:     Conscious Sedation- Needs  for 6 hours after the procedure  MAC/General-Needs  for 24 hours after procedure    Y Informed patient they will need an adult          Cannot take any type of public or medical transportation alone    Y Confirmed Nurse will call to complete assessment      Pre-Procedure Covid test to be completed [ESSC PCR Testing Required]    DOUBLE CHECK BMI AND SCOUT  NEEDS ADULT -CANNOT TAKE PUBILC OR MEDICAL TRANSPOTATION  COVID TEST FOR [facility] 3-4 DAYS  COIVD TEST FOR [facility] CAN BE HOME     Additional comments:

## 2023-02-07 ENCOUNTER — TELEPHONE (OUTPATIENT)
Dept: GASTROENTEROLOGY | Facility: CLINIC | Age: 58
End: 2023-02-07
Payer: COMMERCIAL

## 2023-02-07 DIAGNOSIS — Z86.0100 HISTORY OF COLONIC POLYPS: Primary | ICD-10-CM

## 2023-02-07 RX ORDER — BISACODYL 5 MG
TABLET, DELAYED RELEASE (ENTERIC COATED) ORAL
Qty: 4 TABLET | Refills: 0 | Status: SHIPPED | OUTPATIENT
Start: 2023-02-07 | End: 2023-02-17

## 2023-02-07 NOTE — TELEPHONE ENCOUNTER
Patient scheduled for Colonoscopy  on 02.17.2023.     Discuss Covid policy.     Pre op exam scheduled: N/A    Arrival time: 0645. Procedure time 0730    Facility location: [facility]; [address], [place] [address]    Sedation type: Conscious sedation     Anticoagulations? No    Electronic implanted devices? No    Diabetic? No    Indication for procedure: History of colonic polyps  Bowel prep recommendation: Standard Golytely     Prep instructions sent via [employer] Bowel prep script sent to [facility] #[id number] - [place], [place] -  [address]  ( [address])    Pre visit planning completed.    Dalila Loza RN  Endoscopy Procedure Pre Assessment RN

## 2023-02-07 NOTE — TELEPHONE ENCOUNTER
Attempted to contact patient regarding upcoming Colonoscopy  procedure on 02.17.2023 for pre assessment questions. No answer.     Left message to return call to [phone] #4    Discuss Covid policy and designated  policy.    Pre op exam scheduled: N/A    Arrival time: 0645. Procedure time: 0730    Facility location: [facility] [place]; [address], [place] [address]    Sedation type: Conscious sedation     Anticoagulants: No    Electronic implanted devices? No    Diabetic? No    Indication for procedure: hx of colon polyps    Bowel prep recommendation: Standard Golytely      Prep instructions sent via [employer]. Bowel prep script sent to [facility] #[id number] - [place], [place] -  [address]  ( [address])      Dalila Loza RN  Endoscopy Procedure Pre Assessment RN

## 2023-02-09 NOTE — TELEPHONE ENCOUNTER
Patient returned call.     Pre assessment questions completed for upcoming Colonoscopy  procedure scheduled on 2/17/2023    COVID policy reviewed.     Pre-op scheduled  N/A    Reviewed procedural arrival time 0645, procedure time 0730and facility location [facility]; [address], [place] [address]    Designated  policy reviewed. Instructed to have someone stay 6 hours post procedure.     Reviewed procedure prep instructions.     Patient verbalized understanding and had no questions or concerns at this time.    Dalila Alexander RN  Endoscopy Procedure Pre Assessment RN

## 2023-02-17 ENCOUNTER — HOSPITAL ENCOUNTER (OUTPATIENT)
Facility: AMBULATORY SURGERY CENTER | Age: 58
Discharge: HOME | End: 2023-02-17
Attending: FAMILY MEDICINE | Admitting: FAMILY MEDICINE
Payer: COMMERCIAL

## 2023-02-17 VITALS
SYSTOLIC BLOOD PRESSURE: 125 MMHG | RESPIRATION RATE: 16 BRPM | HEART RATE: 76 BPM | DIASTOLIC BLOOD PRESSURE: 73 MMHG | OXYGEN SATURATION: 95 % | TEMPERATURE: 97.8 F

## 2023-02-17 DIAGNOSIS — Z86.0100 HISTORY OF COLON POLYPS: Primary | ICD-10-CM

## 2023-02-17 LAB — COLONOSCOPY: NORMAL

## 2023-02-17 PROCEDURE — 45385 COLONOSCOPY W/LESION REMOVAL: CPT

## 2023-02-17 PROCEDURE — 99152 MOD SED SAME PHYS/QHP 5/>YRS: CPT | Mod: 59 | Performed by: FAMILY MEDICINE

## 2023-02-17 PROCEDURE — 99153 MOD SED SAME PHYS/QHP EA: CPT | Performed by: FAMILY MEDICINE

## 2023-02-17 PROCEDURE — G8907 PT DOC NO EVENTS ON DISCHARG: HCPCS

## 2023-02-17 PROCEDURE — 88305 TISSUE EXAM BY PATHOLOGIST: CPT | Performed by: STUDENT IN AN ORGANIZED HEALTH CARE EDUCATION/TRAINING PROGRAM

## 2023-02-17 PROCEDURE — G8918 PT W/O PREOP ORDER IV AB PRO: HCPCS

## 2023-02-17 PROCEDURE — 45385 COLONOSCOPY W/LESION REMOVAL: CPT | Performed by: FAMILY MEDICINE

## 2023-02-17 RX ORDER — ONDANSETRON 2 MG/ML
4 INJECTION INTRAMUSCULAR; INTRAVENOUS
Status: DISCONTINUED | OUTPATIENT
Start: 2023-02-17 | End: 2023-02-18 | Stop reason: HOSPADM

## 2023-02-17 RX ORDER — FENTANYL CITRATE 50 UG/ML
INJECTION, SOLUTION INTRAMUSCULAR; INTRAVENOUS PRN
Status: DISCONTINUED | OUTPATIENT
Start: 2023-02-17 | End: 2023-02-17 | Stop reason: HOSPADM

## 2023-02-17 RX ORDER — LIDOCAINE 40 MG/G
CREAM TOPICAL
Status: DISCONTINUED | OUTPATIENT
Start: 2023-02-17 | End: 2023-02-18 | Stop reason: HOSPADM

## 2023-02-17 NOTE — H&P
Pre-Endoscopy History and Physical     Gabriella Jeffries MRN# [id number]   YOB: 1965 Age: 57 year old     Date of Procedure: 2/17/2023  Primary care provider: Erika Delgado  Type of Endoscopy: colonoscopy  Reason for Procedure: history of polyps  Type of Anesthesia Anticipated: Moderate Sedation    HPI:    Gabriella is a 57 year old female who will be undergoing the above procedure.      A history and physical has been performed. The patient's medications and allergies have been reviewed. The risks and benefits of the procedure and the sedation options and risks were discussed with the patient.  All questions were answered and informed consent was obtained.      She denies a personal or family history of anesthesia complications or bleeding disorders.     Allergies   Allergen Reactions     No Known Drug Allergy         Cannot display prior to admission medications because the patient has not been admitted in this contact.       Patient Active Problem List   Diagnosis     Psoriasis     CARDIOVASCULAR SCREENING; LDL GOAL LESS THAN 160     Abdominal pain     IBS (irritable colon syndrome)     Abnormal LFTs     Disorder of bursae and tendons in shoulder region     Left shoulder pain     Esophageal reflux     H/O: hysterectomy     Left elbow pain     Lateral epicondylitis, unspecified laterality     Elevated blood pressure reading without diagnosis of hypertension     DIANA (generalized anxiety disorder)     Depressive disorder in remission     SBO (small bowel obstruction) (H)        Past Medical History:   Diagnosis Date     Bowel obstruction (H) 03/2016    [place]     Disorders of bursae and tendons in shoulder region, unspecified 09/27/2012     Enteritis      Hypertension February 2021     Mild major depression (H) 03/29/2019     Mixed hyperlipemias      PONV (postoperative nausea and vomiting)      Psoriasis         Past Surgical History:   Procedure Laterality Date     ABDOMEN SURGERY  03/04/2016     lysis of adhesions for bowel obstruction     C/SECTION, LOW TRANSVERSE      , Low Transverse     COLONOSCOPY      I cant remember the date. A few years ago.     HYSTERECTOMY, VAGINAL  2006    has ovaries, due to bleeding     ORTHOPEDIC SURGERY  10/2015    Right shoulder     SHOULDER SURGERY Right 10/03/2014    SAD/DCE     SURGICAL HISTORY OF -   2006    rectocele and TVT     SURGICAL HISTORY OF -       left lumpectomy       Social History     Tobacco Use     Smoking status: Never     Smokeless tobacco: Never   Substance Use Topics     Alcohol use: Yes     Comment: Seldom       Family History   Problem Relation Age of Onset     Heart Disease Mother         defibrillator     Cardiovascular Mother         defibrillator     Diabetes Mother      Hyperlipidemia Mother      Cerebrovascular Disease Mother      Anesthesia Reaction Mother      Cardiovascular Father         valve replacement     Hypertension Father      Eye Disorder Father         maccular degeneration     Heart Disease Father         valve replacement     Lipids Father      Hyperlipidemia Father      Genitourinary Problems Brother         CKD     Diabetes Brother      Cancer Brother         leukemia     Other Cancer Brother         Lukemia     Breast Cancer Niece      Other Cancer Niece      Other Cancer Brother         Skin     Asthma Son      C.A.D. No family hx of      Cancer - colorectal No family hx of      Prostate Cancer No family hx of      Alzheimer Disease No family hx of      Arthritis No family hx of      Blood Disease No family hx of      Circulatory No family hx of      Gastrointestinal Disease No family hx of      Musculoskeletal Disorder No family hx of      Neurologic Disorder No family hx of      Respiratory No family hx of      Thyroid Disease No family hx of      Alcohol/Drug No family hx of      Allergies No family hx of      Congenital Anomalies No family hx of      Connective Tissue Disorder No family hx of      Depression No  "family hx of      Anxiety Disorder No family hx of      Mental Illness No family hx of      Substance Abuse No family hx of      Osteoporosis No family hx of      Genetic Disorder No family hx of      Obesity No family hx of        REVIEW OF SYSTEMS:     5 point ROS negative except as noted above in HPI, including Gen., Resp., CV, GI &  system review.      PHYSICAL EXAM:   LMP  (LMP Unknown)  Estimated body mass index is 30.78 kg/m  as calculated from the following:    Height as of 11/16/22: 1.592 m (5' 2.68\").    Weight as of 11/16/22: 78 kg (172 lb).   GENERAL APPEARANCE: healthy, alert and no distress  MENTAL STATUS: alert and oriented x 3  AIRWAY EXAM: Mallampatti Class II (visualization of the soft palate, fauces, and uvula)  RESP: lungs clear to auscultation - no rales, rhonchi or wheezes  CV: regular rates and rhythm and normal S1 S2, no S3 or S4      DIAGNOSTICS:    Not indicated      IMPRESSION   ASA Class 2 - Mild systemic disease        PLAN:       Plan for colonoscopy. We discussed the risks, benefits and alternatives and the patient wished to proceed.    The above has been forwarded to the consulting provider.      Signed Electronically by: Erika Delgado MD  February 17, 2023    "

## 2023-02-21 LAB
PATH REPORT.COMMENTS IMP SPEC: NORMAL
PATH REPORT.COMMENTS IMP SPEC: NORMAL
PATH REPORT.FINAL DX SPEC: NORMAL
PATH REPORT.GROSS SPEC: NORMAL
PATH REPORT.MICROSCOPIC SPEC OTHER STN: NORMAL
PATH REPORT.RELEVANT HX SPEC: NORMAL
PHOTO IMAGE: NORMAL

## 2023-10-17 ENCOUNTER — PATIENT OUTREACH (OUTPATIENT)
Dept: CARE COORDINATION | Facility: CLINIC | Age: 58
End: 2023-10-17
Payer: COMMERCIAL

## 2023-10-26 ENCOUNTER — E-VISIT (OUTPATIENT)
Dept: FAMILY MEDICINE | Facility: CLINIC | Age: 58
End: 2023-10-26
Payer: COMMERCIAL

## 2023-10-26 DIAGNOSIS — J01.90 ACUTE SINUSITIS WITH SYMPTOMS > 10 DAYS: Primary | ICD-10-CM

## 2023-10-26 DIAGNOSIS — J01.90 ACUTE NON-RECURRENT SINUSITIS, UNSPECIFIED LOCATION: Primary | ICD-10-CM

## 2023-10-26 PROCEDURE — 99421 OL DIG E/M SVC 5-10 MIN: CPT | Performed by: FAMILY MEDICINE

## 2023-10-26 PROCEDURE — 99207 PR NON-BILLABLE SERV PER CHARTING: CPT | Performed by: FAMILY MEDICINE

## 2023-10-27 NOTE — PATIENT INSTRUCTIONS
Thank you for choosing us for your care. I have placed an order for a prescription so that you can start treatment. View your full visit summary for details by clicking on the link below. Your pharmacist will able to address any questions you may have about the medication.     If you're not feeling better within 5-7 days, please schedule an appointment.  You can schedule an appointment right here in [facility], or call [phone]  If the visit is for the same symptoms as your eVisit, we'll refund the cost of your eVisit if seen within seven days.

## 2023-10-31 ENCOUNTER — PATIENT OUTREACH (OUTPATIENT)
Dept: CARE COORDINATION | Facility: CLINIC | Age: 58
End: 2023-10-31
Payer: COMMERCIAL

## 2023-10-31 ENCOUNTER — PATIENT OUTREACH (OUTPATIENT)
Dept: GASTROENTEROLOGY | Facility: CLINIC | Age: 58
End: 2023-10-31
Payer: COMMERCIAL

## 2023-12-13 NOTE — PATIENT INSTRUCTIONS
Please do Epley Maneuvers at home 3x daily.  Meclizine for dizziness.  Medrol Dose Pack for rash and Hydroxyzine for itching.    Please keep you appointment on 15 November 2019 with Dr. Delgado.    Please call or return to clinic for any questions, concerns, or symptoms that are not improving or becoming worse.    Tanya Cedeno, CNP     Home

## 2024-02-07 ENCOUNTER — E-VISIT (OUTPATIENT)
Dept: FAMILY MEDICINE | Facility: CLINIC | Age: 59
End: 2024-02-07

## 2024-02-07 DIAGNOSIS — J01.90 ACUTE BACTERIAL SINUSITIS: Primary | ICD-10-CM

## 2024-02-07 DIAGNOSIS — B96.89 ACUTE BACTERIAL SINUSITIS: Primary | ICD-10-CM

## 2024-02-07 PROCEDURE — 99421 OL DIG E/M SVC 5-10 MIN: CPT | Performed by: FAMILY MEDICINE

## 2024-02-07 NOTE — PATIENT INSTRUCTIONS
Thank you for choosing us for your care. I have placed an order for a prescription so that you can start treatment. View your full visit summary for details by clicking on the link below. Your pharmacist will able to address any questions you may have about the medication.     If you're not feeling better within 5-7 days, please schedule an appointment.  You can schedule an appointment right here in [facility], or call [phone]  If the visit is for the same symptoms as your eVisit, we'll refund the cost of your eVisit if seen within seven days.

## 2024-02-17 ENCOUNTER — HEALTH MAINTENANCE LETTER (OUTPATIENT)
Age: 59
End: 2024-02-17

## 2024-03-01 SDOH — HEALTH STABILITY: PHYSICAL HEALTH: ON AVERAGE, HOW MANY DAYS PER WEEK DO YOU ENGAGE IN MODERATE TO STRENUOUS EXERCISE (LIKE A BRISK WALK)?: 2 DAYS

## 2024-03-01 SDOH — HEALTH STABILITY: PHYSICAL HEALTH: ON AVERAGE, HOW MANY MINUTES DO YOU ENGAGE IN EXERCISE AT THIS LEVEL?: 20 MIN

## 2024-03-01 ASSESSMENT — SOCIAL DETERMINANTS OF HEALTH (SDOH): HOW OFTEN DO YOU GET TOGETHER WITH FRIENDS OR RELATIVES?: PATIENT DECLINED

## 2024-03-04 ENCOUNTER — OFFICE VISIT (OUTPATIENT)
Dept: FAMILY MEDICINE | Facility: CLINIC | Age: 59
End: 2024-03-04
Payer: COMMERCIAL

## 2024-03-04 VITALS
WEIGHT: 178.4 LBS | TEMPERATURE: 97.6 F | OXYGEN SATURATION: 100 % | BODY MASS INDEX: 31.61 KG/M2 | HEIGHT: 63 IN | RESPIRATION RATE: 16 BRPM | DIASTOLIC BLOOD PRESSURE: 72 MMHG | SYSTOLIC BLOOD PRESSURE: 130 MMHG | HEART RATE: 66 BPM

## 2024-03-04 DIAGNOSIS — M79.672 LEFT FOOT PAIN: ICD-10-CM

## 2024-03-04 DIAGNOSIS — I10 BENIGN ESSENTIAL HYPERTENSION: ICD-10-CM

## 2024-03-04 DIAGNOSIS — R73.01 IMPAIRED FASTING GLUCOSE: ICD-10-CM

## 2024-03-04 DIAGNOSIS — Z12.31 ENCOUNTER FOR SCREENING MAMMOGRAM FOR MALIGNANT NEOPLASM OF BREAST: ICD-10-CM

## 2024-03-04 DIAGNOSIS — R74.01 ELEVATED ALT MEASUREMENT: ICD-10-CM

## 2024-03-04 DIAGNOSIS — R13.10 DYSPHAGIA, UNSPECIFIED TYPE: ICD-10-CM

## 2024-03-04 DIAGNOSIS — K21.9 GASTROESOPHAGEAL REFLUX DISEASE, UNSPECIFIED WHETHER ESOPHAGITIS PRESENT: ICD-10-CM

## 2024-03-04 DIAGNOSIS — Z00.00 ROUTINE GENERAL MEDICAL EXAMINATION AT A HEALTH CARE FACILITY: Primary | ICD-10-CM

## 2024-03-04 DIAGNOSIS — M25.50 POLYARTHRALGIA: ICD-10-CM

## 2024-03-04 PROBLEM — R03.0 ELEVATED BLOOD PRESSURE READING WITHOUT DIAGNOSIS OF HYPERTENSION: Status: RESOLVED | Noted: 2019-03-29 | Resolved: 2024-03-04

## 2024-03-04 LAB
BASOPHILS # BLD AUTO: 0.1 10E3/UL (ref 0–0.2)
BASOPHILS NFR BLD AUTO: 1 %
EOSINOPHIL # BLD AUTO: 0.1 10E3/UL (ref 0–0.7)
EOSINOPHIL NFR BLD AUTO: 1 %
ERYTHROCYTE [DISTWIDTH] IN BLOOD BY AUTOMATED COUNT: 13.5 % (ref 10–15)
ERYTHROCYTE [SEDIMENTATION RATE] IN BLOOD BY WESTERGREN METHOD: 18 MM/HR (ref 0–30)
HCT VFR BLD AUTO: 39.4 % (ref 35–47)
HGB BLD-MCNC: 13.2 G/DL (ref 11.7–15.7)
IMM GRANULOCYTES # BLD: 0 10E3/UL
IMM GRANULOCYTES NFR BLD: 0 %
LYMPHOCYTES # BLD AUTO: 1.9 10E3/UL (ref 0.8–5.3)
LYMPHOCYTES NFR BLD AUTO: 23 %
MCH RBC QN AUTO: 29.7 PG (ref 26.5–33)
MCHC RBC AUTO-ENTMCNC: 33.5 G/DL (ref 31.5–36.5)
MCV RBC AUTO: 89 FL (ref 78–100)
MONOCYTES # BLD AUTO: 0.7 10E3/UL (ref 0–1.3)
MONOCYTES NFR BLD AUTO: 8 %
NEUTROPHILS # BLD AUTO: 5.6 10E3/UL (ref 1.6–8.3)
NEUTROPHILS NFR BLD AUTO: 67 %
PLATELET # BLD AUTO: 332 10E3/UL (ref 150–450)
RBC # BLD AUTO: 4.44 10E6/UL (ref 3.8–5.2)
WBC # BLD AUTO: 8.3 10E3/UL (ref 4–11)

## 2024-03-04 PROCEDURE — 84443 ASSAY THYROID STIM HORMONE: CPT | Performed by: PHYSICIAN ASSISTANT

## 2024-03-04 PROCEDURE — 86431 RHEUMATOID FACTOR QUANT: CPT | Performed by: PHYSICIAN ASSISTANT

## 2024-03-04 PROCEDURE — 80061 LIPID PANEL: CPT | Performed by: PHYSICIAN ASSISTANT

## 2024-03-04 PROCEDURE — 80053 COMPREHEN METABOLIC PANEL: CPT | Performed by: PHYSICIAN ASSISTANT

## 2024-03-04 PROCEDURE — 99214 OFFICE O/P EST MOD 30 MIN: CPT | Mod: 25 | Performed by: PHYSICIAN ASSISTANT

## 2024-03-04 PROCEDURE — 86038 ANTINUCLEAR ANTIBODIES: CPT | Performed by: PHYSICIAN ASSISTANT

## 2024-03-04 PROCEDURE — 36415 COLL VENOUS BLD VENIPUNCTURE: CPT | Performed by: PHYSICIAN ASSISTANT

## 2024-03-04 PROCEDURE — 85025 COMPLETE CBC W/AUTO DIFF WBC: CPT | Performed by: PHYSICIAN ASSISTANT

## 2024-03-04 PROCEDURE — 99396 PREV VISIT EST AGE 40-64: CPT | Performed by: PHYSICIAN ASSISTANT

## 2024-03-04 PROCEDURE — 86140 C-REACTIVE PROTEIN: CPT | Performed by: PHYSICIAN ASSISTANT

## 2024-03-04 PROCEDURE — 83036 HEMOGLOBIN GLYCOSYLATED A1C: CPT | Performed by: PHYSICIAN ASSISTANT

## 2024-03-04 PROCEDURE — 85652 RBC SED RATE AUTOMATED: CPT | Performed by: PHYSICIAN ASSISTANT

## 2024-03-04 NOTE — PROGRESS NOTES
Preventive Care Visit  [facility] DEE Schmidt PA-C, Family Medicine  Mar 4, 2024    Assessment & Plan     Routine general medical examination at a health care facility  Reviewed VS, weight/BMI   Routine screenings see orders  Immunizations: see orders and documentation in HPI. Discussed vaccines coverage as pharmacy benefit.  Health and cancer screening recommendations delivered according to the USPSTF and other appropriate society guidelines  Nutrition and exercise counseling performed.    - CBC with Platelets & Differential; Future  - Lipid panel reflex to direct LDL Fasting; Future  - CBC with Platelets & Differential  - Lipid panel reflex to direct LDL Fasting    Benign essential hypertension  Refilled lisinopril 40mg daily  Tolerating medication well, refilled.   Labs per orders if due.  Advised DASH diet and heart healthy eating plan, regular exercise, wt management, and limited alcohol advised  - Comprehensive metabolic panel; Future  - Comprehensive metabolic panel    Gastroesophageal reflux disease, unspecified whether esophagitis present  Dysphagia, unspecified type  Has been taking famotidine without benefit.   Dysphagia sx. Plan for EGD.   Discussed changing to PPI but will try to schedule scope first .  Chew foods well, avoid difficult/trigger foods. Beverages with meals.   S/sx for ER discussed   - Adult GI  Referral - Procedure Only; Future    Encounter for screening mammogram for malignant neoplasm of breast  Discussed screening; advised and ordered mammogram. Pt given info to schedule.   - *MA Screening Digital Bilateral; Future    Polyarthralgia  Discussed OA vs RA or inflammatory arthritis.   Labs below   - Anti Nuclear Yadi IgG by IFA with Reflex; Future  - Rheumatoid factor; Future  - ESR: Erythrocyte sedimentation rate; Future  - CRP, inflammation; Future  - TSH with free T4 reflex; Future  - Anti Nuclear Yadi IgG by IFA with Reflex  - Rheumatoid factor  -  "ESR: Erythrocyte sedimentation rate  - CRP, inflammation  - TSH with free T4 reflex    Left foot pain  Ref to podiatry   - Orthopedic  Referral; Future      BMI  Estimated body mass index is 32.11 kg/m  as calculated from the following:    Height as of this encounter: 1.588 m (5' 2.5\").    Weight as of this encounter: 80.9 kg (178 lb 6.4 oz).   Weight management plan: Discussed healthy diet and exercise guidelines    Counseling  Appropriate preventive services were discussed with this patient, including applicable screening as appropriate for fall prevention, nutrition, physical activity, Tobacco-use cessation, weight loss and cognition.  Checklist reviewing preventive services available has been given to the patient.  Reviewed patient's diet, addressing concerns and/or questions.   She is at risk for lack of exercise and has been provided with information to increase physical activity for the benefit of her well-being.   She is at risk for psychosocial distress and has been provided with information to reduce risk.       Follow Up: see above. Additionally patient was instructed to contact clinic for worsening symptoms, non-improvement in time frame discussed, and for questions regarding treatment plan.   For virtual visits, the patient was advised to be seen for in person evaluation if symptoms or condition are worsening or non-improvement as expected.   Ethel Schmidt PA-C    Zeeshan Quiroz is a 58 year old, presenting for the following:  Physical        3/4/2024     2:45 PM   Additional Questions   Roomed by Amelia CLAYTON CMA   Accompanied by SELF         3/4/2024     2:45 PM   Patient Reported Additional Medications   Patient reports taking the following new medications n/a        Health Care Directive  Patient does not have a Health Care Directive or Living Will: Discussed advance care planning with patient; however, patient declined at this time.    HPI  Routine Health Maintenance:  Immunizations " "- just had covid in Jan 2024  Mammogram: 06/2022  Colonoscopy: 02/17/2023. Repeat in 5 yr.     Fasting: yes  Lipids screening: hx of LDL > 200 at one time a few yr ago, then in 2022 LDL was in 160s. Heart hx in both parents.   Diabetes screening: normal prior.     GYN Hx:s/p hyst.    Menopause: never had hot flashes but when her blood pressure was running higher earlier this year felt like maybe was having some.   Sexually active/STD screening concerns: no new partners  Denies GYN concerns of PCB, pelvic pain, dyspareunia, vaginal discharge    HTN- lisinopril 40mg. BP here today 130/72.   Checking bp at home- running high in Jan and Feb 2024 on home readings- she had covid in that time frame. But readings have come back down. Trying to eat better. No alcohol since December.  Exercise- walk dog 1.5 miles. No CV sx.   No side effects.     GERD- was taking famotidine its is not helping. Sometimes I have a hard time swallowing- meat, bread- its uncomfortable indicates neck and upper sternum. Can't sleep on back because I cough when I lay flat - attributes to reflux.  Hasn't taken a PPI in a long time.     Joint pains   My bones ache all the time.   My left foot hurts on the outside. No injury.   Bilat knee pain. Right - torn meniscus- had steroid shot a few years ago.   Took prednisone after a root canal and \"I felt like a millon bucks.\"  No fam hx of RA      \"ILVEN\" - an inflammatory skin condition. Clobetasol for that.           3/1/2024   General Health   How would you rate your overall physical health? Good   Feel stress (tense, anxious, or unable to sleep) Only a little   (!) STRESS CONCERN      3/1/2024   Nutrition   Three or more servings of calcium each day? Yes   Diet: Regular (no restrictions)   How many servings of fruit and vegetables per day? (!) 2-3   How many sweetened beverages each day? 0-1         3/1/2024   Exercise   Days per week of moderate/strenous exercise 2 days   Average minutes spent " exercising at this level 20 min   (!) EXERCISE CONCERN      3/1/2024   Social Factors   Frequency of gathering with friends or relatives Patient declined   Worry food won't last until get money to buy more No   Food not last or not have enough money for food? No   Do you have housing?  Yes   Are you worried about losing your housing? No   Lack of transportation? No   Unable to get utilities (heat,electricity)? No         3/1/2024   Fall Risk   Fallen 2 or more times in the past year? No   Trouble with walking or balance? No          3/1/2024   Dental   Dentist two times every year? Yes         3/1/2024   TB Screening   Were you born outside of [place]?  No         Today's PHQ-2 Score:       3/4/2024     2:41 PM   PHQ-2 ( 1999 [employer])   Q1: Little interest or pleasure in doing things 0   Q2: Feeling down, depressed or hopeless 0   PHQ-2 Score 0   Q1: Little interest or pleasure in doing things Not at all   Q2: Feeling down, depressed or hopeless Not at all   PHQ-2 Score 0           3/1/2024   Substance Use   Alcohol more than 3/day or more than 7/wk Not Applicable   Do you use any other substances recreationally? No     Social History     Tobacco Use    Smoking status: Never    Smokeless tobacco: Never   Vaping Use    Vaping Use: Never used   Substance Use Topics    Alcohol use: Not Currently     Comment: Seldom; none since Dec 2023.    Drug use: No           11/15/2022   LAST FHS-7 RESULTS   1st degree relative breast or ovarian cancer No   Any relative bilateral breast cancer No   Any male have breast cancer No   Any ONE woman have BOTH breast AND ovarian cancer No    Any woman with breast cancer before 50yrs Yes- my niece (sister's daughter) at age 30    2 or more relatives with breast AND/OR ovarian cancer No   2 or more relatives with breast AND/OR bowel cancer No       Mammogram Screening - Mammogram every 1-2 years updated in Health Maintenance based on mutual decision making        3/1/2024   STI Screening   New  sexual partner(s) since last STI/HIV test? No     History of abnormal Pap smear: Status post benign hysterectomy. Health Maintenance and Surgical History updated.       ASCVD Risk   The 10-year ASCVD risk score (Neville LOPEZ, et al., 2019) is: 3.4%    Values used to calculate the score:      Age: 58 years      Sex: Female      Is Non- : No      Diabetic: No      Tobacco smoker: No      Systolic Blood Pressure: 130 mmHg      Is BP treated: Yes      HDL Cholesterol: 73 mg/dL      Total Cholesterol: 260 mg/dL         Reviewed and updated as needed this visit by Provider                    Past Medical History:   Diagnosis Date    Bowel obstruction (H) 03/2016    [place]    Disorders of bursae and tendons in shoulder region, unspecified 09/27/2012    Enteritis     Hypertension February 2021    Mild major depression (H24) 03/29/2019    Mixed hyperlipemias     PONV (postoperative nausea and vomiting)     Psoriasis      Lab work is in process  Labs reviewed in EPIC  BP Readings from Last 3 Encounters:   03/04/24 130/72   02/17/23 125/73   11/16/22 132/74    Wt Readings from Last 3 Encounters:   03/04/24 80.9 kg (178 lb 6.4 oz)   11/16/22 78 kg (172 lb)   06/20/22 72.6 kg (160 lb)                  Patient Active Problem List   Diagnosis    Psoriasis    CARDIOVASCULAR SCREENING; LDL GOAL LESS THAN 160    Abdominal pain    IBS (irritable colon syndrome)    Abnormal LFTs    Disorder of bursae and tendons in shoulder region    Left shoulder pain    Esophageal reflux    H/O: hysterectomy    Left elbow pain    Lateral epicondylitis, unspecified laterality    Elevated blood pressure reading without diagnosis of hypertension    DIANA (generalized anxiety disorder)    Depressive disorder in remission    SBO (small bowel obstruction) (H)     Past Surgical History:   Procedure Laterality Date    ABDOMEN SURGERY  03/04/2016    lysis of adhesions for bowel obstruction    C/SECTION, LOW TRANSVERSE       , Low Transverse    COLONOSCOPY      I cant remember the date. A few years ago.    COLONOSCOPY N/A 2023    Procedure: COLONOSCOPY, FLEXIBLE, WITH LESION REMOVAL USING SNARE;  Surgeon: Erika Delgado MD;  Location: [facility] OR    COLONOSCOPY WITH CO2 INSUFFLATION N/A 2023    Procedure: COLONOSCOPY, WITH CO2 INSUFFLATION;  Surgeon: Erika Delgado MD;  Location: [facility] OR    HYSTERECTOMY, VAGINAL  2006    has ovaries, due to bleeding    ORTHOPEDIC SURGERY  10/2015    Right shoulder    SHOULDER SURGERY Right 10/03/2014    SAD/DCE    SURGICAL HISTORY OF -   2006    rectocele and TVT    SURGICAL HISTORY OF -       left lumpectomy       Social History     Tobacco Use    Smoking status: Never    Smokeless tobacco: Never   Substance Use Topics    Alcohol use: Not Currently     Comment: Seldom; none since Dec 2023.     Family History   Problem Relation Age of Onset    Heart Disease Mother         defibrillator    Cardiovascular Mother         defibrillator    Diabetes Mother     Hyperlipidemia Mother     Cerebrovascular Disease Mother     Anesthesia Reaction Mother     Cardiovascular Father         valve replacement    Hypertension Father     Eye Disorder Father         maccular degeneration    Heart Disease Father         valve replacement    Lipids Father     Hyperlipidemia Father     Genitourinary Problems Brother         CKD    Diabetes Brother     Cancer Brother         leukemia    Other Cancer Brother         Lukemia    Breast Cancer Niece     Other Cancer Niece     Other Cancer Brother         Skin    Asthma Son     C.A.D. No family hx of     Cancer - colorectal No family hx of     Prostate Cancer No family hx of     Alzheimer Disease No family hx of     Arthritis No family hx of     Blood Disease No family hx of     Circulatory No family hx of     Gastrointestinal Disease No family hx of     Musculoskeletal Disorder No family hx of     Neurologic Disorder No family hx of     Respiratory No family hx  "of     Thyroid Disease No family hx of     Alcohol/Drug No family hx of     Allergies No family hx of     Congenital Anomalies No family hx of     Connective Tissue Disorder No family hx of     Depression No family hx of     Anxiety Disorder No family hx of     Mental Illness No family hx of     Substance Abuse No family hx of     Osteoporosis No family hx of     Genetic Disorder No family hx of     Obesity No family hx of          Current Outpatient Medications   Medication Sig Dispense Refill    Blood Pressure Monitoring (BLOOD PRESSURE MONITOR 3) MICHELLE 1 Units daily 1 each 0    clobetasol (TEMOVATE) 0.05 % external ointment Apply  topically 2 times daily as needed. Apply sparingly to affected area.  Do not apply to face. 45 g 1    famotidine (PEPCID) 40 MG tablet TAKE 1 TABLET(40 MG) BY MOUTH DAILY 90 tablet 1    lisinopril (ZESTRIL) 40 MG tablet Take 1 tablet (40 mg) by mouth daily ++due for office visit++ 90 tablet 0     Allergies   Allergen Reactions    No Known Drug Allergy          Review of Systems  Constitutional, HEENT, cardiovascular, pulmonary, gi and gu systems are negative, except as otherwise noted.     Objective    Exam  /72   Pulse 66   Temp 97.6  F (36.4  C) (Temporal)   Resp 16   Ht 1.588 m (5' 2.5\")   Wt 80.9 kg (178 lb 6.4 oz)   LMP  (LMP Unknown)   SpO2 100%   BMI 32.11 kg/m     Estimated body mass index is 32.11 kg/m  as calculated from the following:    Height as of this encounter: 1.588 m (5' 2.5\").    Weight as of this encounter: 80.9 kg (178 lb 6.4 oz).    Physical Exam  GENERAL: alert and no distress  EYES: Eyes grossly normal to inspection, PERRL and conjunctivae and sclerae normal  HENT: ear canals and TM's normal, nose and mouth without ulcers or lesions  NECK: no adenopathy, no asymmetry, masses, or scars  RESP: lungs clear to auscultation - no rales, rhonchi or wheezes  BREAST: normal without masses, tenderness or nipple discharge and no palpable axillary masses or " adenopathy  CV: regular rate and rhythm, normal S1 S2, no S3 or S4, no murmur, click or rub, no peripheral edema  ABDOMEN: soft, nontender, no hepatosplenomegaly, no masses and bowel sounds normal  MS: no gross musculoskeletal defects noted, no edema  NEURO: Normal strength and tone, mentation intact and speech normal  PSYCH: mentation appears normal, affect normal/bright    Results for orders placed or performed in visit on 03/04/24   ESR: Erythrocyte sedimentation rate     Status: Normal   Result Value Ref Range    Erythrocyte Sedimentation Rate 18 0 - 30 mm/hr   CBC with platelets and differential     Status: None   Result Value Ref Range    WBC Count 8.3 4.0 - 11.0 10e3/uL    RBC Count 4.44 3.80 - 5.20 10e6/uL    Hemoglobin 13.2 11.7 - 15.7 g/dL    Hematocrit 39.4 35.0 - 47.0 %    MCV 89 78 - 100 fL    MCH 29.7 26.5 - 33.0 pg    MCHC 33.5 31.5 - 36.5 g/dL    RDW 13.5 10.0 - 15.0 %    Platelet Count 332 150 - 450 10e3/uL    % Neutrophils 67 %    % Lymphocytes 23 %    % Monocytes 8 %    % Eosinophils 1 %    % Basophils 1 %    % Immature Granulocytes 0 %    Absolute Neutrophils 5.6 1.6 - 8.3 10e3/uL    Absolute Lymphocytes 1.9 0.8 - 5.3 10e3/uL    Absolute Monocytes 0.7 0.0 - 1.3 10e3/uL    Absolute Eosinophils 0.1 0.0 - 0.7 10e3/uL    Absolute Basophils 0.1 0.0 - 0.2 10e3/uL    Absolute Immature Granulocytes 0.0 <=0.4 10e3/uL   CBC with Platelets & Differential     Status: None    Narrative    The following orders were created for panel order CBC with Platelets & Differential.  Procedure                               Abnormality         Status                     ---------                               -----------         ------                     CBC with platelets and d...[[id number]]                      Final result                 Please view results for these tests on the individual orders.         Signed Electronically by: Ethel Schmidt PA-C

## 2024-03-04 NOTE — PATIENT INSTRUCTIONS
To schedule your Imaging Test or Specialty Referral please call:  [facility]   Radiology (imaging tests:  mammogram, ultrasound, CT, MRI): [phone]  Speciality consultation [phone]  [address]  [place] [address]    [facility]  Radiology (imaging): [phone]  Specialty consultation [phone]  Colonoscopy [phone]  [address] [place] [address]     Other Mammogram Options:  [place] Locations:  [place], [place], [place], [place]*, [place]* ([facility]), [facility]*-- Call to schedule [phone]  [place] Region Locations: [place], [place], [facility] ([place], [place]*, [facility]ana Coles, [facility] [place]*, [facility] [place]*, [place], [place], [place]-- Call to schedule [phone]   [facility] Locations: [place]* and [facility]-- Call to schedule [phone]  *locations marked with a star have 3D mammography available     For your blood pressure:  We care about controlling your blood pressure to decrease risk of heart disease, stroke, and kidney disease.    Monitor your blood pressure at least every other week with a home blood pressure cuff. Report these to me if consistently >140/90.    Decrease caffeine consumption.    Stay on a strict sleep schedule, going to bed at the same time, get up at the same time, and at least 8 hours.  If noticing snoring, daytime sleepiness, or episodes where you stop breathing in your sleep request a sleep study.    Work on weight loss/maintaining a healthy weight:  Follow the DASH diet. (https://www.nhlbi.nih.gov/education/dash-eating-plan)  Increase exercise to 30 minutes a day 5 days a week (150 minutes a week on average).    Limit alcohol use    Some over the counter medications can increase blood pressures like:  - sudafed (pseudoephedrine)  "containing products (also called \"decongestants\")   - NSAID medications like ibuprofen, advil, motrin, aleve, naproxen    If you are starting or changing a medication: please schedule a follow up blood pressure check visit and lab visit in 2 weeks     Preventive Care Advice   This is general advice given by our system to help you stay healthy. However, your care team may have specific advice just for you. Please talk to your care team about your preventive care needs.  Nutrition  Eat 5 or more servings of fruits and vegetables each day.  Try wheat bread, brown rice and whole grain pasta (instead of white bread, rice, and pasta).  Get enough calcium and vitamin D. Check the label on foods and aim for 100% of the RDA (recommended daily allowance).  Lifestyle  Exercise at least 150 minutes each week   (30 minutes a day, 5 days a week).  Do muscle strengthening activities 2 days a week. These help control your weight and prevent disease.  No smoking.  Wear sunscreen to prevent skin cancer.  Have a dental exam and cleaning every 6 months.  Yearly exams  See your health care team every year to talk about:  Any changes in your health.  Any medicines your care team has prescribed.  Preventive care, family planning, and ways to prevent chronic diseases.  Shots (vaccines)   HPV shots (up to age 26), if you've never had them before.  Hepatitis B shots (up to age 59), if you've never had them before.  COVID-19 shot: Get this shot when it's due.  Flu shot: Get a flu shot every year.  Tetanus shot: Get a tetanus shot every 10 years.  Pneumococcal, hepatitis A, and RSV shots: Ask your care team if you need these based on your risk.  Shingles shot (for age 50 and up).  General health tests  Diabetes screening:  Starting at age 35, Get screened for diabetes at least every 3 years.  If you are younger than age 35, ask your care team if you should be screened for diabetes.  Cholesterol test: At age 39, start having a cholesterol test " every 5 years, or more often if advised.  Bone density scan (DEXA): At age 50, ask your care team if you should have this scan for osteoporosis (brittle bones).  Hepatitis C: Get tested at least once in your life.  STIs (sexually transmitted infections)  Before age 24: Ask your care team if you should be screened for STIs.  After age 24: Get screened for STIs if you're at risk. You are at risk for STIs (including HIV) if:  You are sexually active with more than one person.  You don't use condoms every time.  You or a partner was diagnosed with a sexually transmitted infection.  If you are at risk for HIV, ask about PrEP medicine to prevent HIV.  Get tested for HIV at least once in your life, whether you are at risk for HIV or not.  Cancer screening tests  Cervical cancer screening: If you have a cervix, begin getting regular cervical cancer screening tests at age 21. Most people who have regular screenings with normal results can stop after age 65. Talk about this with your provider.  Breast cancer scan (mammogram): If you've ever had breasts, begin having regular mammograms starting at age 40. This is a scan to check for breast cancer.  Colon cancer screening: It is important to start screening for colon cancer at age 45.  Have a colonoscopy test every 10 years (or more often if you're at risk) Or, ask your provider about stool tests like a FIT test every year or Cologuard test every 3 years.  To learn more about your testing options, visit: https://www.dinCloud/903815.pdf.  For help making a decision, visit: https://bit.ly/qr68625.  Prostate cancer screening test: If you have a prostate and are age 55 to 69, ask your provider if you would benefit from a yearly prostate cancer screening test.  Lung cancer screening: If you are a current or former smoker age 50 to 80, ask your care team if ongoing lung cancer screenings are right for you.  For informational purposes only. Not to replace the advice of your health  care provider. Copyright   2023 [facility]. All rights reserved. Clinically reviewed by the [facility] Transitions Program. Salesfusion 658240 - REV 01/24.    Learning About Stress  What is stress?     Stress is your body's response to a hard situation. Your body can have a physical, emotional, or mental response. Stress is a fact of life for most people, and it affects everyone differently. What causes stress for you may not be stressful for someone else.  A lot of things can cause stress. You may feel stress when you go on a job interview, take a test, or run a race. This kind of short-term stress is normal and even useful. It can help you if you need to work hard or react quickly. For example, stress can help you finish an important job on time.  Long-term stress is caused by ongoing stressful situations or events. Examples of long-term stress include long-term health problems, ongoing problems at work, or conflicts in your family. Long-term stress can harm your health.  How does stress affect your health?  When you are stressed, your body responds as though you are in danger. It makes hormones that speed up your heart, make you breathe faster, and give you a burst of energy. This is called the fight-or-flight stress response. If the stress is over quickly, your body goes back to normal and no harm is done.  But if stress happens too often or lasts too long, it can have bad effects. Long-term stress can make you more likely to get sick, and it can make symptoms of some diseases worse. If you tense up when you are stressed, you may develop neck, shoulder, or low back pain. Stress is linked to high blood pressure and heart disease.  Stress also harms your emotional health. It can make you laboy, tense, or depressed. Your relationships may suffer, and you may not do well at work or school.  What can you do to manage stress?  You can try these things to help manage stress:   Do something active.  Exercise or activity can help reduce stress. Walking is a great way to get started. Even everyday activities such as housecleaning or yard work can help.  Try yoga or barb chi. These techniques combine exercise and meditation. You may need some training at first to learn them.  Do something you enjoy. For example, listen to music or go to a movie. Practice your hobby or do volunteer work.  Meditate. This can help you relax, because you are not worrying about what happened before or what may happen in the future.  Do guided imagery. Imagine yourself in any setting that helps you feel calm. You can use online videos, books, or a teacher to guide you.  Do breathing exercises. For example:  From a standing position, bend forward from the waist with your knees slightly bent. Let your arms dangle close to the floor.  Breathe in slowly and deeply as you return to a standing position. Roll up slowly and lift your head last.  Hold your breath for just a few seconds in the standing position.  Breathe out slowly and bend forward from the waist.  Let your feelings out. Talk, laugh, cry, and express anger when you need to. Talking with supportive friends or family, a counselor, or a francis leader about your feelings is a healthy way to relieve stress. Avoid discussing your feelings with people who make you feel worse.  Write. It may help to write about things that are bothering you. This helps you find out how much stress you feel and what is causing it. When you know this, you can find better ways to cope.  What can you do to prevent stress?  You might try some of these things to help prevent stress:  Manage your time. This helps you find time to do the things you want and need to do.  Get enough sleep. Your body recovers from the stresses of the day while you are sleeping.  Get support. Your family, friends, and community can make a difference in how you experience stress.  Limit your news feed. Avoid or limit time on social  "media or news that may make you feel stressed.  Do something active. Exercise or activity can help reduce stress. Walking is a great way to get started.  Where can you learn more?  Go to https://www.Qbaka.net/patiented  Enter N032 in the search box to learn more about \"Learning About Stress.\"  Current as of: February 26, 2023               Content Version: 13.8    [id number] [employer].   Care instructions adapted under license by your healthcare professional. If you have questions about a medical condition or this instruction, always ask your healthcare professional. [employer] disclaims any warranty or liability for your use of this information.      "

## 2024-03-05 LAB
ALBUMIN SERPL BCG-MCNC: 4.6 G/DL (ref 3.5–5.2)
ALP SERPL-CCNC: 90 U/L (ref 40–150)
ALT SERPL W P-5'-P-CCNC: 57 U/L (ref 0–50)
ANION GAP SERPL CALCULATED.3IONS-SCNC: 14 MMOL/L (ref 7–15)
AST SERPL W P-5'-P-CCNC: 32 U/L (ref 0–45)
BILIRUB SERPL-MCNC: 0.3 MG/DL
BUN SERPL-MCNC: 10.5 MG/DL (ref 6–20)
CALCIUM SERPL-MCNC: 9.7 MG/DL (ref 8.6–10)
CHLORIDE SERPL-SCNC: 103 MMOL/L (ref 98–107)
CHOLEST SERPL-MCNC: 248 MG/DL
CREAT SERPL-MCNC: 0.74 MG/DL (ref 0.51–0.95)
CRP SERPL-MCNC: 8.95 MG/L
DEPRECATED HCO3 PLAS-SCNC: 24 MMOL/L (ref 22–29)
EGFRCR SERPLBLD CKD-EPI 2021: >90 ML/MIN/1.73M2
FASTING STATUS PATIENT QL REPORTED: ABNORMAL
GLUCOSE SERPL-MCNC: 122 MG/DL (ref 70–99)
HDLC SERPL-MCNC: 56 MG/DL
LDLC SERPL CALC-MCNC: 176 MG/DL
NONHDLC SERPL-MCNC: 192 MG/DL
POTASSIUM SERPL-SCNC: 4.3 MMOL/L (ref 3.4–5.3)
PROT SERPL-MCNC: 7.5 G/DL (ref 6.4–8.3)
RHEUMATOID FACT SERPL-ACNC: <10 IU/ML
SODIUM SERPL-SCNC: 141 MMOL/L (ref 135–145)
TRIGL SERPL-MCNC: 79 MG/DL
TSH SERPL DL<=0.005 MIU/L-ACNC: 0.81 UIU/ML (ref 0.3–4.2)

## 2024-03-05 RX ORDER — LISINOPRIL 40 MG/1
40 TABLET ORAL DAILY
Qty: 90 TABLET | Refills: 3 | Status: SHIPPED | OUTPATIENT
Start: 2024-03-05 | End: 2024-04-23

## 2024-03-06 ENCOUNTER — TELEPHONE (OUTPATIENT)
Dept: GASTROENTEROLOGY | Facility: CLINIC | Age: 59
End: 2024-03-06

## 2024-03-06 ENCOUNTER — TELEPHONE (OUTPATIENT)
Dept: GASTROENTEROLOGY | Facility: CLINIC | Age: 59
End: 2024-03-06
Payer: COMMERCIAL

## 2024-03-06 LAB — ANA SER QL IF: NEGATIVE

## 2024-03-06 NOTE — TELEPHONE ENCOUNTER
Pre assessment completed for upcoming procedure.   (Please see previous telephone encounter notes for complete details)    Patient  returned call.       Procedure details:    Arrival time and facility location reviewed.    Pre op exam needed? N/A    Designated  policy reviewed. Instructed to have someone stay 6  hours post procedure.       Medication review:    Medications reviewed. Please see supporting documentation below. Holding recommendations discussed (if applicable).       Prep for procedure:     Procedure prep instructions reviewed.        Any additional information needed:  N/A      Patient  verbalized understanding and had no questions or concerns at this time.      Katherine Ardon RN  Endoscopy Procedure Pre Assessment RN  [phone] option 4

## 2024-03-06 NOTE — TELEPHONE ENCOUNTER
"Endoscopy Scheduling Screen    Have you had a positive Covid test in the last 14 days?  No    Are you active on MyChart?   Yes    What insurance is in the chart?  Other:  [employer]    Ordering/Referring Provider: SUSAN GUTIERRES   (If ordering provider performs procedure, schedule with ordering provider unless otherwise instructed. )    BMI: Estimated body mass index is 32.11 kg/m  as calculated from the following:    Height as of 3/4/24: 1.588 m (5' 2.5\").    Weight as of 3/4/24: 80.9 kg (178 lb 6.4 oz).     Sedation Ordered  moderate sedation.   If patient BMI > 50 do not schedule in [facility].    If patient BMI > 45 do not schedule at [facility].    Are you taking methadone or Suboxone?  No    Have you had difficulties, pain, or discomfort during past endoscopy procedures?  No    Are you taking any prescription medications for pain 3 or more times per week?   NO - No RN review required.    Do you have a history of malignant hyperthermia or adverse reaction to anesthesia?  No    (Females) Are you currently pregnant?   No     Have you been diagnosed or told you have pulmonary hypertension?   No    Do you have an LVAD?  No    Have you been told you have moderate to severe sleep apnea?  No    Have you been told you have COPD, asthma, or any other lung disease?  No    Do you have any heart conditions?  No     Have you ever had an organ transplant?   No    Have you ever had or are you awaiting a heart or lung transplant?   No    Have you had a stroke or transient ischemic attack (TIA aka \"mini stroke\" in the last 6 months?   No    Have you been diagnosed with or been told you have cirrhosis of the liver?   No    Are you currently on dialysis?   No    Do you need assistance transferring?   No    BMI: Estimated body mass index is 32.11 kg/m  as calculated from the following:    Height as of 3/4/24: 1.588 m (5' 2.5\").    Weight as of 3/4/24: 80.9 kg (178 lb 6.4 oz).     Is patients BMI > 40 and scheduling location [facility]?  No    Do " you take an injectable medication for weight loss or diabetes (excluding insulin)?  No    Do you take the medication Naltrexone?  No    Do you take blood thinners?  No       Prep   Are you currently on dialysis or do you have chronic kidney disease?  No    Do you have a diagnosis of diabetes?  No    Do you have a diagnosis of cystic fibrosis (CF)?  No    On a regular basis do you go 3 -5 days between bowel movements?      BMI > 40?      Preferred Pharmacy:    [id number] - [place], [place] - [address]  ( [address])  [address]  [place] [address]  Phone: [phone] Fax: [phone]      Final Scheduling Details   Colonoscopy prep sent?  N/A    Procedure scheduled  Upper endoscopy (EGD)    Surgeon:  PASCUAL     Date of procedure:  3/11/2024     Pre-OP / PAC:   No - Not required for this site.    Location  [facility] - Per order.    Sedation   Moderate Sedation - Per order.      Patient Reminders:   You will receive a call from a Nurse to review instructions and health history.  This assessment must be completed prior to your procedure.  Failure to complete the Nurse assessment may result in the procedure being cancelled.      On the day of your procedure, please designate an adult(s) who can drive you home stay with you for the next 24 hours. The medicines used in the exam will make you sleepy. You will not be able to drive.      You cannot take public transportation, ride share services, or non-medical taxi service without a responsible caregiver.  Medical transport services are allowed with the requirement that a responsible caregiver will receive you at your destination.  We require that drivers and caregivers are confirmed prior to your procedure.

## 2024-03-06 NOTE — TELEPHONE ENCOUNTER
Attempted to contact patient in order to complete pre assessment questions.     No answer. Left message to return call to [phone] option 4    Missed call communication sent via [employer].    Corinne Kliber, RN  Endoscopy Procedure Pre Assessment RN  [phone] option 4

## 2024-03-06 NOTE — TELEPHONE ENCOUNTER
Pre visit planning completed.      Procedure details:    Patient scheduled for Upper endoscopy (EGD) on 3/11/24.     Arrival time: 0700. Procedure time 0700    Pre op exam needed? N/A    Facility location: [facility]; [address], [place] [address]    Sedation type: Conscious sedation     Indication for procedure: Dysphagia, unspecified type       Chart review:     Electronic implanted devices? No    Recent diagnosis of diverticulitis within the last 6 weeks? No    Diabetic? No    Diabetic medication HOLDING recommendations: (if applicable)  Oral diabetic medications: N/A  Diabetic injectables: N/A  Insulin: N/A      Medication review:    Anticoagulants? No    NSAIDS? No NSAID medications per patient's medication list.  RN will verify with pre-assessment call.    Other medication HOLDING recommendations:  EGD: PPIs/Acid reducing medication(s): HOLD 2 weeks before procedure. Due to reflux/GERD.      Prep for procedure:     Bowel prep recommendation: N/A     Prep instructions sent via [facility]        Corinne Kliber, RN  Endoscopy Procedure Pre Assessment RN  [phone] option 4

## 2024-03-07 PROBLEM — R73.01 IMPAIRED FASTING GLUCOSE: Status: ACTIVE | Noted: 2024-03-07

## 2024-03-07 PROBLEM — R13.10 DYSPHAGIA, UNSPECIFIED TYPE: Status: ACTIVE | Noted: 2024-03-07

## 2024-03-07 LAB — HBA1C MFR BLD: 6.3 % (ref 0–5.6)

## 2024-03-08 ENCOUNTER — ANESTHESIA EVENT (OUTPATIENT)
Dept: SURGERY | Facility: AMBULATORY SURGERY CENTER | Age: 59
End: 2024-03-08
Payer: COMMERCIAL

## 2024-03-11 ENCOUNTER — ANESTHESIA (OUTPATIENT)
Dept: SURGERY | Facility: AMBULATORY SURGERY CENTER | Age: 59
End: 2024-03-11
Payer: COMMERCIAL

## 2024-03-11 ENCOUNTER — MYC MEDICAL ADVICE (OUTPATIENT)
Dept: FAMILY MEDICINE | Facility: CLINIC | Age: 59
End: 2024-03-11
Payer: COMMERCIAL

## 2024-03-11 ENCOUNTER — HOSPITAL ENCOUNTER (OUTPATIENT)
Facility: AMBULATORY SURGERY CENTER | Age: 59
Discharge: HOME | End: 2024-03-11
Attending: INTERNAL MEDICINE
Payer: COMMERCIAL

## 2024-03-11 VITALS — HEART RATE: 81 BPM

## 2024-03-11 VITALS
DIASTOLIC BLOOD PRESSURE: 76 MMHG | SYSTOLIC BLOOD PRESSURE: 116 MMHG | HEART RATE: 86 BPM | RESPIRATION RATE: 16 BRPM | OXYGEN SATURATION: 95 % | TEMPERATURE: 97.3 F

## 2024-03-11 DIAGNOSIS — K21.00 GASTROESOPHAGEAL REFLUX DISEASE WITH ESOPHAGITIS WITHOUT HEMORRHAGE: Primary | ICD-10-CM

## 2024-03-11 DIAGNOSIS — R73.01 IMPAIRED FASTING GLUCOSE: Primary | ICD-10-CM

## 2024-03-11 LAB — UPPER GI ENDOSCOPY: NORMAL

## 2024-03-11 PROCEDURE — G8918 PT W/O PREOP ORDER IV AB PRO: HCPCS

## 2024-03-11 PROCEDURE — 43210 EGD ESOPHAGOGASTRC FNDOPLSTY: CPT | Performed by: ANESTHESIOLOGY

## 2024-03-11 PROCEDURE — 43210 EGD ESOPHAGOGASTRC FNDOPLSTY: CPT | Performed by: NURSE ANESTHETIST, CERTIFIED REGISTERED

## 2024-03-11 PROCEDURE — G8907 PT DOC NO EVENTS ON DISCHARG: HCPCS

## 2024-03-11 PROCEDURE — 43235 EGD DIAGNOSTIC BRUSH WASH: CPT

## 2024-03-11 RX ORDER — PROCHLORPERAZINE MALEATE 10 MG
10 TABLET ORAL EVERY 6 HOURS PRN
Status: DISCONTINUED | OUTPATIENT
Start: 2024-03-11 | End: 2024-03-12 | Stop reason: HOSPADM

## 2024-03-11 RX ORDER — LIDOCAINE HYDROCHLORIDE 20 MG/ML
INJECTION, SOLUTION INFILTRATION; PERINEURAL PRN
Status: DISCONTINUED | OUTPATIENT
Start: 2024-03-11 | End: 2024-03-11

## 2024-03-11 RX ORDER — PROPOFOL 10 MG/ML
INJECTION, EMULSION INTRAVENOUS CONTINUOUS PRN
Status: DISCONTINUED | OUTPATIENT
Start: 2024-03-11 | End: 2024-03-11

## 2024-03-11 RX ORDER — NALOXONE HYDROCHLORIDE 0.4 MG/ML
0.4 INJECTION, SOLUTION INTRAMUSCULAR; INTRAVENOUS; SUBCUTANEOUS
Status: DISCONTINUED | OUTPATIENT
Start: 2024-03-11 | End: 2024-03-12 | Stop reason: HOSPADM

## 2024-03-11 RX ORDER — LIDOCAINE 40 MG/G
CREAM TOPICAL
Status: DISCONTINUED | OUTPATIENT
Start: 2024-03-11 | End: 2024-03-12 | Stop reason: HOSPADM

## 2024-03-11 RX ORDER — NALOXONE HYDROCHLORIDE 0.4 MG/ML
0.2 INJECTION, SOLUTION INTRAMUSCULAR; INTRAVENOUS; SUBCUTANEOUS
Status: DISCONTINUED | OUTPATIENT
Start: 2024-03-11 | End: 2024-03-12 | Stop reason: HOSPADM

## 2024-03-11 RX ORDER — ONDANSETRON 2 MG/ML
4 INJECTION INTRAMUSCULAR; INTRAVENOUS
Status: DISCONTINUED | OUTPATIENT
Start: 2024-03-11 | End: 2024-03-12 | Stop reason: HOSPADM

## 2024-03-11 RX ORDER — OMEPRAZOLE 20 MG/1
20 TABLET, DELAYED RELEASE ORAL DAILY
Qty: 30 TABLET | Refills: 0 | Status: SHIPPED | OUTPATIENT
Start: 2024-03-11 | End: 2024-04-23

## 2024-03-11 RX ORDER — FLUMAZENIL 0.1 MG/ML
0.2 INJECTION, SOLUTION INTRAVENOUS
Status: ACTIVE | OUTPATIENT
Start: 2024-03-11 | End: 2024-03-11

## 2024-03-11 RX ORDER — SODIUM CHLORIDE, SODIUM LACTATE, POTASSIUM CHLORIDE, CALCIUM CHLORIDE 600; 310; 30; 20 MG/100ML; MG/100ML; MG/100ML; MG/100ML
INJECTION, SOLUTION INTRAVENOUS CONTINUOUS
Status: DISCONTINUED | OUTPATIENT
Start: 2024-03-11 | End: 2024-03-12 | Stop reason: HOSPADM

## 2024-03-11 RX ORDER — ONDANSETRON 4 MG/1
4 TABLET, ORALLY DISINTEGRATING ORAL EVERY 6 HOURS PRN
Status: DISCONTINUED | OUTPATIENT
Start: 2024-03-11 | End: 2024-03-12 | Stop reason: HOSPADM

## 2024-03-11 RX ORDER — ONDANSETRON 2 MG/ML
4 INJECTION INTRAMUSCULAR; INTRAVENOUS EVERY 6 HOURS PRN
Status: DISCONTINUED | OUTPATIENT
Start: 2024-03-11 | End: 2024-03-12 | Stop reason: HOSPADM

## 2024-03-11 RX ORDER — PROPOFOL 10 MG/ML
INJECTION, EMULSION INTRAVENOUS PRN
Status: DISCONTINUED | OUTPATIENT
Start: 2024-03-11 | End: 2024-03-11

## 2024-03-11 RX ADMIN — PROPOFOL 70 MG: 10 INJECTION, EMULSION INTRAVENOUS at 07:09

## 2024-03-11 RX ADMIN — PROPOFOL 175 MCG/KG/MIN: 10 INJECTION, EMULSION INTRAVENOUS at 07:09

## 2024-03-11 RX ADMIN — LIDOCAINE HYDROCHLORIDE 60 MG: 20 INJECTION, SOLUTION INFILTRATION; PERINEURAL at 07:09

## 2024-03-11 RX ADMIN — SODIUM CHLORIDE, SODIUM LACTATE, POTASSIUM CHLORIDE, CALCIUM CHLORIDE: 600; 310; 30; 20 INJECTION, SOLUTION INTRAVENOUS at 07:09

## 2024-03-11 NOTE — ANESTHESIA POSTPROCEDURE EVALUATION
Patient: Gabriella Jeffries    Procedure: Procedure(s):  Combined Esophagoscopy, Gastroscopy, Duodenoscopy (Egd) With Co2 Insufflation       Anesthesia Type:  MAC    Note:  Disposition: Outpatient   Postop Pain Control: Uneventful            Sign Out: Well controlled pain   PONV: No   Neuro/Psych: Uneventful            Sign Out: Acceptable/Baseline neuro status   Airway/Respiratory: Uneventful            Sign Out: Acceptable/Baseline resp. status   CV/Hemodynamics: Uneventful            Sign Out: Acceptable CV status; No obvious hypovolemia; No obvious fluid overload   Other NRE: NONE   DID A NON-ROUTINE EVENT OCCUR?            Last vitals:  Vitals Value Taken Time   /76 03/11/24 0735   Temp 97.3  F (36.3  C) 03/11/24 0720   Pulse     Resp 16 03/11/24 0735   SpO2 95 % 03/11/24 0735       Electronically Signed By: Chapin Snow MD  March 11, 2024  7:49 AM

## 2024-03-11 NOTE — ANESTHESIA PREPROCEDURE EVALUATION
Anesthesia Pre-Procedure Evaluation    Patient: Gabriella Jeffries   MRN: [id number] : 1965        Procedure : Procedure(s):  Combined Esophagoscopy, Gastroscopy, Duodenoscopy (Egd) With Co2 Insufflation          Past Medical History:   Diagnosis Date     Bowel obstruction (H) 2016    [place]     Disorders of bursae and tendons in shoulder region, unspecified 2012     Enteritis      Hypertension 2021     Mild major depression (H24) 2019     Mixed hyperlipemias      PONV (postoperative nausea and vomiting)      Psoriasis       Past Surgical History:   Procedure Laterality Date     ABDOMEN SURGERY  2016    lysis of adhesions for bowel obstruction     C/SECTION, LOW TRANSVERSE      , Low Transverse     COLONOSCOPY      I cant remember the date. A few years ago.     COLONOSCOPY N/A 2023    Procedure: COLONOSCOPY, FLEXIBLE, WITH LESION REMOVAL USING SNARE;  Surgeon: Erika Delgado MD;  Location: [facility] OR     COLONOSCOPY WITH CO2 INSUFFLATION N/A 2023    Procedure: COLONOSCOPY, WITH CO2 INSUFFLATION;  Surgeon: Erika Delgado MD;  Location: [facility] OR     HYSTERECTOMY, VAGINAL  2006    has ovaries, due to bleeding     ORTHOPEDIC SURGERY  10/2015    Right shoulder     SHOULDER SURGERY Right 10/03/2014    SAD/DCE     SURGICAL HISTORY OF -   2006    rectocele and TVT     SURGICAL HISTORY OF -       left lumpectomy      Allergies   Allergen Reactions     No Known Drug Allergy       Social History     Tobacco Use     Smoking status: Never     Smokeless tobacco: Never   Substance Use Topics     Alcohol use: Not Currently     Comment: Seldom; none since Dec 2023.      Wt Readings from Last 1 Encounters:   24 80.9 kg (178 lb 6.4 oz)        Anesthesia Evaluation            ROS/MED HX  ENT/Pulmonary:       Neurologic:       Cardiovascular:     (+)  hypertension- -   -  - -                                      METS/Exercise Tolerance:     Hematologic:      "  Musculoskeletal:       GI/Hepatic:       Renal/Genitourinary:       Endo:       Psychiatric/Substance Use:       Infectious Disease:       Malignancy:       Other:            Physical Exam    Airway        Mallampati: II   TM distance: > 3 FB   Neck ROM: full     Respiratory Devices and Support         Dental       (+) Completely normal teeth      Cardiovascular          Rhythm and rate: regular     Pulmonary           breath sounds clear to auscultation       OUTSIDE LABS:  CBC:   Lab Results   Component Value Date    WBC 8.3 03/04/2024    WBC 8.9 11/16/2022    HGB 13.2 03/04/2024    HGB 13.5 11/16/2022    HCT 39.4 03/04/2024    HCT 41.0 11/16/2022     03/04/2024     11/16/2022     BMP:   Lab Results   Component Value Date     03/04/2024     11/16/2022    POTASSIUM 4.3 03/04/2024    POTASSIUM 3.6 11/16/2022    CHLORIDE 103 03/04/2024    CHLORIDE 110 (H) 11/16/2022    CO2 24 03/04/2024    CO2 21 11/16/2022    BUN 10.5 03/04/2024    BUN 11 11/16/2022    CR 0.74 03/04/2024    CR 0.71 11/16/2022     (H) 03/04/2024    GLC 99 11/16/2022     COAGS: No results found for: \"PTT\", \"INR\", \"FIBR\"  POC: No results found for: \"BGM\", \"HCG\", \"HCGS\"  HEPATIC:   Lab Results   Component Value Date    ALBUMIN 4.6 03/04/2024    PROTTOTAL 7.5 03/04/2024    ALT 57 (H) 03/04/2024    AST 32 03/04/2024    ALKPHOS 90 03/04/2024    BILITOTAL 0.3 03/04/2024     OTHER:   Lab Results   Component Value Date    A1C 6.3 (H) 03/04/2024    JASON 9.7 03/04/2024    MAG 2.4 (H) 01/06/2021    TSH 0.81 03/04/2024    CRP 5.3 04/25/2012    SED 18 03/04/2024       Anesthesia Plan    ASA Status:  2       Anesthesia Type: MAC.     - Reason for MAC: immobility needed              Consents    Anesthesia Plan(s) and associated risks, benefits, and realistic alternatives discussed. Questions answered and patient/representative(s) expressed understanding.     - Discussed:     - Discussed with:  Patient            Postoperative " "Care    Pain management: Multi-modal analgesia.   PONV prophylaxis: Ondansetron (or other 5HT-3), Background Propofol Infusion     Comments:             Chapin Snow MD    I have reviewed the pertinent notes and labs in the chart from the past 30 days and (re)examined the patient.  Any updates or changes from those notes are reflected in this note.              # Obesity: Estimated body mass index is 32.11 kg/m  as calculated from the following:    Height as of 3/4/24: 1.588 m (5' 2.5\").    Weight as of 3/4/24: 80.9 kg (178 lb 6.4 oz).      "

## 2024-03-11 NOTE — H&P
[facility] Anesthesia Pre-op History and Physical    Gabriella Jeffries MRN# [id number]   Age: 58 year old YOB: 1965            Date of Exam 3/11/2024         Primary care provider: Erika Delgado         Chief Complaint and/or Reason for Procedure:     Dysphagia to solids and liquids x 1 year         Active problem list:     Patient Active Problem List    Diagnosis Date Noted    Impaired fasting glucose 03/07/2024     Priority: Medium    Dysphagia, unspecified type 03/07/2024     Priority: Medium    Benign essential hypertension 03/04/2024     Priority: Medium    Depressive disorder in remission 07/08/2020     Priority: Medium    DIANA (generalized anxiety disorder) 03/29/2019     Priority: Medium    SBO (small bowel obstruction) (H) 03/02/2016     Priority: Medium    Left elbow pain 03/02/2015     Priority: Medium    Lateral epicondylitis, unspecified laterality 03/02/2015     Priority: Medium    H/O: hysterectomy 12/10/2013     Priority: Medium    Left shoulder pain 11/21/2012     Priority: Medium    Disorder of bursae and tendons in shoulder region 09/27/2012     Priority: Medium     Problem list name updated by automated process. Provider to review      IBS (irritable colon syndrome) 05/04/2012     Priority: Medium    Abnormal LFTs 05/04/2012     Priority: Medium    Esophageal reflux 12/08/2011     Priority: Medium    Abdominal pain 09/21/2011     Priority: Medium    CARDIOVASCULAR SCREENING; LDL GOAL LESS THAN 160 10/31/2010     Priority: Medium    Psoriasis 08/26/2010     Priority: Medium            Medications (include herbals and vitamins):   Any Plavix use in the last 7 days? No     Current Outpatient Medications   Medication Sig    lisinopril (ZESTRIL) 40 MG tablet Take 1 tablet (40 mg) by mouth daily    Blood Pressure Monitoring (BLOOD PRESSURE MONITOR 3) MICHELLE 1 Units daily    clobetasol (TEMOVATE) 0.05 % external ointment Apply  topically 2 times daily as needed. Apply sparingly to  affected area.  Do not apply to face.    famotidine (PEPCID) 40 MG tablet TAKE 1 TABLET(40 MG) BY MOUTH DAILY     Current Facility-Administered Medications   Medication    lactated ringers infusion    lactated ringers infusion    lidocaine (LMX4) kit    lidocaine (LMX4) kit    lidocaine 1 % 0.1-1 mL    lidocaine 1 % 0.1-1 mL    ondansetron (ZOFRAN) injection 4 mg    sodium chloride (PF) 0.9% PF flush 3 mL    sodium chloride (PF) 0.9% PF flush 3 mL    sodium chloride (PF) 0.9% PF flush 3 mL    sodium chloride (PF) 0.9% PF flush 3 mL             Allergies:      Allergies   Allergen Reactions    No Known Drug Allergy      Allergy to Latex? No  Allergy to tape?   No  Intolerances:             Physical Exam:   All vitals have been reviewed  Patient Vitals for the past 8 hrs:   BP Temp Temp src Pulse Resp SpO2   03/11/24 0649 (!) 115/91 97.1  F (36.2  C) Temporal 86 16 96 %     No intake/output data recorded.  Lungs:   No increased work of breathing, good air exchange, clear to auscultation bilaterally, no crackles or wheezing     Cardiovascular:   normal S1 and S2             Lab / Radiology Results:            Anesthetic risk and/or ASA classification:     2  Sushma Burgos DO

## 2024-03-11 NOTE — ANESTHESIA CARE TRANSFER NOTE
Patient: Gabriella Jeffries    Procedure: Procedure(s):  Combined Esophagoscopy, Gastroscopy, Duodenoscopy (Egd) With Co2 Insufflation       Diagnosis: Dysphagia, unspecified type [R13.10]  Diagnosis Additional Information: No value filed.    Anesthesia Type:   MAC     Note:    Oropharynx: oropharynx clear of all foreign objects  Level of Consciousness: awake  Oxygen Supplementation: room air    Independent Airway: airway patency satisfactory and stable    Vital Signs Stable: post-procedure vital signs reviewed and stable  Report to RN Given: handoff report given  Patient transferred to: Phase II  Comments: Anesthesia Care Transfer Note    Patient: Gabriella Jeffries    Transferred to: Phase II    Patient vital signs: stable    Airway: none    Monitors on, breathing spontaneously, report to RN    Alicia Donahue CRNA  3/11/2024 7:19 AM         Handoff Report: Identifed the Patient, Identified the Reponsible Provider, Reviewed the pertinent medical history, Discussed the surgical course, Reviewed Intra-OP anesthesia mangement and issues during anesthesia, Set expectations for post-procedure period and Allowed opportunity for questions and acknowledgement of understanding      Vitals:  Vitals Value Taken Time   BP     Temp     Pulse     Resp     SpO2         Electronically Signed By: NUNU Craig CRNA  March 11, 2024  7:19 AM

## 2024-03-18 ENCOUNTER — VIRTUAL VISIT (OUTPATIENT)
Dept: EDUCATION SERVICES | Facility: CLINIC | Age: 59
End: 2024-03-18
Attending: PHYSICIAN ASSISTANT
Payer: COMMERCIAL

## 2024-03-18 DIAGNOSIS — R73.01 IMPAIRED FASTING GLUCOSE: ICD-10-CM

## 2024-03-18 PROCEDURE — 97802 MEDICAL NUTRITION INDIV IN: CPT | Mod: 95 | Performed by: DIETITIAN, REGISTERED

## 2024-03-18 NOTE — PATIENT INSTRUCTIONS
[email or web address]  [email or web address]      3 Carbohydrate Choice Sample Meal Plans for Lunch or Supper   Carbohydrate choices found in (   )  2/3 cup whole grain spaghetti noodles (2) +    cup low sodium pasta sauce (1)+ 4 oz lean ground beef or turkey (0) +  2 cups lettuce + veggies (0) + 1-2 tsbp salad dressing (0) 2 slices of whole grain bread (2)  3 oz lean turkey (0) + lettuce/tomato (0)  2 tsp ram (0)  1 small apple (1) 1 cup of low sodium broth based soup (1) +  2 slices of whole grain bread (2)   2 oz of low fat cheese (0) + 1 tsp butter (0)  Carrots/celery (0) 2 cups lettuce salad (0) +   cup garbanzo beans (1) + +   cup tuna (0) + 2 Tablespoons low-fat vinaigrette salad dressing (0) +   cup grapes (1) + 6 oz light yogurt (1)   1 cup low sodium broth based soup (1) + 6 saltine crackers (1) + 1 small apple (1) + broccoli/cauliflower and low fat dip (0)   3 oz lean steak (0) + 1 small 3 oz baked potato (1) + 1 tsp low fat sour cream (0) + 1 cup green beans (0) + 1 small dinner roll (1)   1 cup berries (1) + 1 tbsp light whipped cream (0)  2 cups lettuce salad (0)   3 oz grilled chicken (0)  1-2 Tsbp light Caesar dressing (0)  + 1 Tbps grated cheese (0)    cup grapes (1)  5 Triscuit crackers (1)  8 oz skim milk (1)   3 oz chicken (0)  1 cup sweet potato (2)  1 cup asparagus (0)  1 small apple (1)  16 oz sparkling water (unsweetened)   1 hamburger flip (0) on hamburger bun (2) + 1/2 order of small plummer (1) + + 1 garden salad (0) with 1 package of vinaigrette (0) + 1 cup baby carrots + 1/2 cup green beans-cooked (0)  2 slices whole grain bread (2)+ 2 oz lean ham (0) + leaf lettuce/tomato/onion (0)  2 teaspoons ram (0)  1 small pear (1)  1 cup raw veggie sticks (0) 2 slices thin crust pizza (2) + 2 cups lettuce salad (0) + 10 cherry tomatoes (0) + 2 Tbsp light salad dressing (0) + 1 small peach (1) 2 small whole grain tortillas (2) +   cup fat free refried beans (1) + 3 oz shredded lean beef (0) + 2 Tsbp  salsa (0)+ lettuce/tomato (0) + 1 Tbsp light sour cream (0)   1 cup low sodium chicken noodle soup (1) + 2 slices whole grain bread (2) + 2 oz lean turkey (0) + 1 oz low fat cheese (0) + 1-2 teaspoons of butter or margarine  3 oz turkey (0) + 1 cup mashed potatoes (2) + 1 tsp butter (0) + 1 cup green beans (0) +   cup unsweetened apple sauce (1) 3 oz chicken + 2 medium pierogis (2) + 1/3 cup cabbage (0) +   cup grapes (1) +   cup green beans (0) I cup of beef stroganoff (0) + 2/3 cup egg noodles (2) + 1 cup broccoli (0) + 1 cup carrots (0) + 1 cup of watermelon (1)     Sample 3 Carb Breakfast Choices- Carbohydrate choices found in (  )    cup cooked cereal (1.5)  1 cup blueberries (1)  4 oz skim milk (0.5)  2 tablespoons of nuts (0)   1 whole grain English muffin (2)    cup mixed fruit (1)  1 boiled egg (0) 6 oz light yogurt (1)  1 small orange (1)  1 slice whole grain toast (1)  1 slice low fat cheese (0)    1 small whole grain tortilla (1)  1 cup skim milk (1)     banana (1)  1 Tablespoon peanut butter (0)   8 oz skim milk (1) + 1 packet of instant breakfast mix (2)   2 slices whole grain toast (2)   1 cup skim milk (1)  1 scrambled egg (0) 1 cup cubed sweet potatoes (2)  1 scrambled egg  (0)  Vegetables (0)  1 cup skim milk (1)   1 cup of skim milk (1)  1 medium banana (2)  1-2 tablespoons of peanut butter  (0)           Snack Ideas for your Meal Plan     Protein (1 serving = 6-8 grams of protein each)  Beef Jerky ( 2 pieces)  Beef Sticks, plain (1 stick)  Cheese/Cheese Stick (1 oz)  Chicken breast (1 oz)  Cottage cheese, low fat (1/4 cup)  Crab, Imitation (2 oz)  Deli Meat (2 oz)  Edamame, boiled (1/2 cup)  Egg, hardboiled (1)  Shrimp, small (10 pieces)   Turkey Breast (1 oz)  Tuna, canned in water (1 oz)  Fairlife Milk (1/2 cup)  Yogurt, Greek : Siggis, Oikos Triple Zero, Dannon Light and Fit, etc (6 oz)    Carb (1 carb choice/serving = 15 grams)   Apple (medium)  Applesauce, unsweetened (1/2 cup)  Apricots,  dried (4 whole)  Banana, medium (1/2)  Bread, 1 slice   Cantaloupe/Melon (1 cup)  Crackers 4-6 (varies by brand)  Cherries (15)  Cranberries, Dried (2 tbsp)  Dark Chocolate (1 oz)  Dates, dried (2-3 pieces)  Fruit cocktail, in own juice (1/2 cup)  Granola Bar (vary by brand)  Grapes (1/2 cup)  Ice cream, slow churned/low fat (1/2 cup)  Kiwi (~2)  Mixed Berries (1 cup)  Orange (1 medium)  Peach (1 medium)  Pear (1/2 medium)  Plums (2)  Pomegranate (1 small)  Popcorn, stove popped (2 cups)  Pretzels (3/4 oz)  Pudding, sugar free (1/2 cup)  Raisins (2 Tbsp)  Rice Cake, (2)  Skim or 1% milk (1 cup)  Tortilla Chips (1 oz)  Yogurt (greek or plain)   cup    Fat (1 serving = 100 calories)  Almonds (2 Tbsp)  Avocado (1/3 fruit OR 3 Tbsp)  Cashews (11 whole)  Cheese (1 oz)  Chocolate, dark (3/4 oz)  Coconut, unsweetened (1/3 c shredded)  Hummus (3 Tbsp)  Peanuts (20 pieces)  Peanut/Nut Butter (1 Tbsp)  Pecans (10 halves)  Pumpkin seeds, unshelled (2 Tbsp)  Salad dressing  (1-2 Tbsp)  Sunflower seeds (2 Tbsp)  Vegetable Dip (1-2 Tbsp)  Walnuts (8 halves)    Free Foods  Bell Peppers  Broccoli  Carrots   Cauliflower   Celery  Coffee, black (regular or decaf)  Cucumber  Gelatin, Sugar Free  Herbal Tea, unsweetened   Pea Pods  Pickles (high in sodium)   Popsicle, Sugar Free  Salsa (2 Tbsp)  Tomatoes    Combination Snacks  Apple + 1 Tbsp peanut butter (carbohydrate + fat)  Handful crackers + 1 oz cheese (carbohydrate + fat or protein)  Carrot sticks + Hummus (free food + fat)  1 piece of peanut butter toast (carbohydrate + fat)  Greek yogurt + 2 Tbsp sunflower seeds (carbohydrate + fat)  Hard-boiled egg +   cup grapes (protein + carbohydrate)  1 piece of avocado toast (carbohydrate + fat)  Cucumbers + dip (free food + fat)      Ethel Bronson RD [facility]  Triage: [phone]  [phone]

## 2024-03-18 NOTE — PROGRESS NOTES
"Medical Nutrition Therapy  Visit Type:Initial assessment and intervention  Gabriella Jeffries presents today for MNT and education related to prediabetes.   She is accompanied by self.     Type of service:  Video Visit    If the video visit is dropped, the video visit invitation should be resent by: Text to cell phone: [phone]    Originating Location (pt. Location): Home  Distant Location (provider location): [facility]  Mode of Communication:  Video Conference via [employer]    Video Start Time:  9:00 am  Video End Time (time video stopped): 9:45 am    How would patient like to obtain AVS? MyChart      ASSESSMENT:   Patient comments/concerns relating to nutrition: Doris states that she does not have any diabetes in her family, her mother did although get diabetes as she got older. She did have GDM twice last pregnancy ended 19 years ago. She also mentions that her daughter was recently diagnosed with prediabetes so they have been sharing information. Has been cutting out bread, crackers, rice, etc. Has done Arbonne diet in the past- \"clean eating, protein shakes\" she did lose weight on this and felt really good.      NUTRITION HISTORY:  Breakfast: 1 piece of whole wheat bread, peanut butter, with some berries and a little bit of cottage cheese    Lunch: in the office- caribou coffee OR cottage cheese and fruit OR at home- leftovers or cottage cheese with fruit  Dinner: Chicken (crockpot with salsa) on a taco shell or with chips or make a salad OR spaghetti OR pot pie OR shrimp/salmon on the grill with vegetable and maybe potato   Snacks: was having crackers (peanut butter), trying to do more vegetables and hummus or low fat string cheese, non fat greek yogurt- plain with honey and fruit   Beverages: Water all day, sometimes flavored water, coffee in the morning 2-3 cups (with skim milk)     Misses meals? Might have some snacks instead of lunch   Eats out:  1-2 meals/per month "     Previous diet education:  Yes - when she had GDM     Food allergies/intolerances: none    EXERCISE: hasn't been exercising, walks dog (goes to dog park) - 2-3 times/week     SOCIO/ECONOMIC:   Lives with: self, spouse, and daughter    MEDICATIONS:  Current Outpatient Medications   Medication    Blood Pressure Monitoring (BLOOD PRESSURE MONITOR 3) MICHELLE    clobetasol (TEMOVATE) 0.05 % external ointment    famotidine (PEPCID) 40 MG tablet    lisinopril (ZESTRIL) 40 MG tablet    omeprazole (PRILOSEC OTC) 20 MG EC tablet     No current facility-administered medications for this visit.       LABS:  Lab Results   Component Value Date     03/04/2024     06/02/2021      Lab Results   Component Value Date    POTASSIUM 4.3 03/04/2024    POTASSIUM 3.6 11/16/2022    POTASSIUM 4.1 06/02/2021     Lab Results   Component Value Date    CHLORIDE 103 03/04/2024    CHLORIDE 110 11/16/2022    CHLORIDE 106 06/02/2021     Lab Results   Component Value Date    JASON 9.7 03/04/2024    JASON 9.3 06/02/2021     Lab Results   Component Value Date    CO2 24 03/04/2024    CO2 21 11/16/2022    CO2 29 06/02/2021     Lab Results   Component Value Date    BUN 10.5 03/04/2024    BUN 11 11/16/2022    BUN 12 06/02/2021     Lab Results   Component Value Date    CR 0.74 03/04/2024    CR 0.80 06/02/2021     Lab Results   Component Value Date     03/04/2024    GLC 99 11/16/2022     06/02/2021     Lab Results   Component Value Date     03/04/2024     11/15/2019     HDL Cholesterol   Date Value Ref Range Status   11/15/2019 66 >49 mg/dL Final     Direct Measure HDL   Date Value Ref Range Status   03/04/2024 56 >=50 mg/dL Final   ]  GFR Estimate   Date Value Ref Range Status   03/04/2024 >90 >60 mL/min/1.73m2 Final   06/02/2021 83 >60 mL/min/[1.73_m2] Final     Comment:     Non  GFR Calc  Starting 12/18/2018, serum creatinine based estimated GFR (eGFR) will be   calculated using the Chronic Kidney Disease  "Epidemiology Collaboration   (CKD-EPI) equation.       Lab Results   Component Value Date    CR 0.74 2024    CR 0.80 2021     No results found for: \"MICROALBUMIN\"    ANTHROPOMETRICS:  Vitals: LMP  (LMP Unknown)   There is no height or weight on file to calculate BMI.      Wt Readings from Last 5 Encounters:   24 80.9 kg (178 lb 6.4 oz)   22 78 kg (172 lb)   22 72.6 kg (160 lb)   22 76.5 kg (168 lb 11.2 oz)   21 74.8 kg (165 lb)       Weight Change: increasing    NUTRITION DIAGNOSIS: Food- and nutrition-related knowledge deficit related to limited previous education as evidenced by patient statements and new referral    NUTRITION INTERVENTION:  Education given to support: general nutrition guidelines, weight reduction, consistent meals, carb counting, labeling, fat modification, exercise, dining out/special occasions, fiber, behavior modification, portion control, and heart healthy diet  Education Materials Provided: My Plate Planner/Choose My Plate, Carbohydrate Counting, and preventing diabetes    PATIENT'S BEHAVIOR CHANGE GOALS:   See Patient Instructions for patient stated behavior change goals. AVS was printed and given to patient at today's appointment.    MONITOR / EVALUATE:  RD will monitor/evaluate:  Progress toward meeting stated nutrition-related goals  Readiness to change nutrition-related behaviors    FOLLOW-UP:  Follow up with RD as needed.  Call RD with questions/concerns.     GERMÁN Brownlee [facility] [place]  Triage: [phone]  [phone]    Time spent in minutes: 45  Encounter: Individual    "

## 2024-03-18 NOTE — LETTER
"    3/18/2024         RE: Gabriella Jeffries  [address] [place] [address]        Dear Colleague,    Thank you for referring your patient, Gabriella Jeffries, to the [facility]. Please see a copy of my visit note below.    Medical Nutrition Therapy  Visit Type:Initial assessment and intervention  Gabriella Jeffries presents today for MNT and education related to prediabetes.   She is accompanied by self.     Type of service:  Video Visit    If the video visit is dropped, the video visit invitation should be resent by: Text to cell phone: [phone]    Originating Location (pt. Location): Home  Distant Location (provider location): [facility]  Mode of Communication:  Video Conference via [employer]    Video Start Time:  9:00 am  Video End Time (time video stopped): 9:45 am    How would patient like to obtain AVS? MyChart      ASSESSMENT:   Patient comments/concerns relating to nutrition: Doris states that she does not have any diabetes in her family, her mother did although get diabetes as she got older. She did have GDM twice last pregnancy ended 19 years ago. She also mentions that her daughter was recently diagnosed with prediabetes so they have been sharing information. Has been cutting out bread, crackers, rice, etc. Has done Arbonne diet in the past- \"clean eating, protein shakes\" she did lose weight on this and felt really good.      NUTRITION HISTORY:  Breakfast: 1 piece of whole wheat bread, peanut butter, with some berries and a little bit of cottage cheese    Lunch: in the office- caribou coffee OR cottage cheese and fruit OR at home- leftovers or cottage cheese with fruit  Dinner: Chicken (crockpot with salsa) on a taco shell or with chips or make a salad OR spaghetti OR pot pie OR shrimp/salmon on the grill with vegetable and maybe potato   Snacks: was having crackers (peanut butter), trying to do more vegetables and hummus or low fat string " cheese, non fat greek yogurt- plain with honey and fruit   Beverages: Water all day, sometimes flavored water, coffee in the morning 2-3 cups (with skim milk)     Misses meals? Might have some snacks instead of lunch   Eats out:  1-2 meals/per month     Previous diet education:  Yes - when she had GDM     Food allergies/intolerances: none    EXERCISE: hasn't been exercising, walks dog (goes to dog park) - 2-3 times/week     SOCIO/ECONOMIC:   Lives with: self, spouse, and daughter    MEDICATIONS:  Current Outpatient Medications   Medication     Blood Pressure Monitoring (BLOOD PRESSURE MONITOR 3) MICHELLE     clobetasol (TEMOVATE) 0.05 % external ointment     famotidine (PEPCID) 40 MG tablet     lisinopril (ZESTRIL) 40 MG tablet     omeprazole (PRILOSEC OTC) 20 MG EC tablet     No current facility-administered medications for this visit.       LABS:  Lab Results   Component Value Date     03/04/2024     06/02/2021      Lab Results   Component Value Date    POTASSIUM 4.3 03/04/2024    POTASSIUM 3.6 11/16/2022    POTASSIUM 4.1 06/02/2021     Lab Results   Component Value Date    CHLORIDE 103 03/04/2024    CHLORIDE 110 11/16/2022    CHLORIDE 106 06/02/2021     Lab Results   Component Value Date    JASON 9.7 03/04/2024    JASON 9.3 06/02/2021     Lab Results   Component Value Date    CO2 24 03/04/2024    CO2 21 11/16/2022    CO2 29 06/02/2021     Lab Results   Component Value Date    BUN 10.5 03/04/2024    BUN 11 11/16/2022    BUN 12 06/02/2021     Lab Results   Component Value Date    CR 0.74 03/04/2024    CR 0.80 06/02/2021     Lab Results   Component Value Date     03/04/2024    GLC 99 11/16/2022     06/02/2021     Lab Results   Component Value Date     03/04/2024     11/15/2019     HDL Cholesterol   Date Value Ref Range Status   11/15/2019 66 >49 mg/dL Final     Direct Measure HDL   Date Value Ref Range Status   03/04/2024 56 >=50 mg/dL Final   ]  GFR Estimate   Date Value Ref Range  "Status   2024 >90 >60 mL/min/1.73m2 Final   2021 83 >60 mL/min/[1.73_m2] Final     Comment:     Non  GFR Calc  Starting 2018, serum creatinine based estimated GFR (eGFR) will be   calculated using the Chronic Kidney Disease Epidemiology Collaboration   (CKD-EPI) equation.       Lab Results   Component Value Date    CR 0.74 2024    CR 0.80 2021     No results found for: \"MICROALBUMIN\"    ANTHROPOMETRICS:  Vitals: LMP  (LMP Unknown)   There is no height or weight on file to calculate BMI.      Wt Readings from Last 5 Encounters:   24 80.9 kg (178 lb 6.4 oz)   22 78 kg (172 lb)   22 72.6 kg (160 lb)   22 76.5 kg (168 lb 11.2 oz)   21 74.8 kg (165 lb)       Weight Change: increasing    NUTRITION DIAGNOSIS: Food- and nutrition-related knowledge deficit related to limited previous education as evidenced by patient statements and new referral    NUTRITION INTERVENTION:  Education given to support: general nutrition guidelines, weight reduction, consistent meals, carb counting, labeling, fat modification, exercise, dining out/special occasions, fiber, behavior modification, portion control, and heart healthy diet  Education Materials Provided: My Plate Planner/Choose My Plate, Carbohydrate Counting, and preventing diabetes    PATIENT'S BEHAVIOR CHANGE GOALS:   See Patient Instructions for patient stated behavior change goals. AVS was printed and given to patient at today's appointment.    MONITOR / EVALUATE:  RD will monitor/evaluate:  Progress toward meeting stated nutrition-related goals  Readiness to change nutrition-related behaviors    FOLLOW-UP:  Follow up with RD as needed.  Call RD with questions/concerns.     GERMÁN Brownlee [facility]  Triage: [phone]  [phone]    Time spent in minutes: 45  Encounter: Individual      "

## 2024-03-20 ENCOUNTER — OFFICE VISIT (OUTPATIENT)
Dept: PODIATRY | Facility: OTHER | Age: 59
End: 2024-03-20
Attending: PHYSICIAN ASSISTANT
Payer: COMMERCIAL

## 2024-03-20 ENCOUNTER — ANCILLARY PROCEDURE (OUTPATIENT)
Dept: GENERAL RADIOLOGY | Facility: OTHER | Age: 59
End: 2024-03-20
Attending: PODIATRIST
Payer: COMMERCIAL

## 2024-03-20 VITALS
HEIGHT: 63 IN | BODY MASS INDEX: 31.01 KG/M2 | SYSTOLIC BLOOD PRESSURE: 144 MMHG | WEIGHT: 175 LBS | HEART RATE: 80 BPM | DIASTOLIC BLOOD PRESSURE: 83 MMHG

## 2024-03-20 DIAGNOSIS — M76.72 PERONEAL TENDONITIS OF LEFT LOWER EXTREMITY: ICD-10-CM

## 2024-03-20 DIAGNOSIS — M79.672 LEFT FOOT PAIN: ICD-10-CM

## 2024-03-20 DIAGNOSIS — Q66.89 TARSAL COALITION: Primary | ICD-10-CM

## 2024-03-20 DIAGNOSIS — L30.9 DERMATITIS: ICD-10-CM

## 2024-03-20 PROCEDURE — 99203 OFFICE O/P NEW LOW 30 MIN: CPT | Performed by: PODIATRIST

## 2024-03-20 PROCEDURE — 73630 X-RAY EXAM OF FOOT: CPT | Mod: TC | Performed by: RADIOLOGY

## 2024-03-20 ASSESSMENT — PAIN SCALES - GENERAL: PAINLEVEL: MODERATE PAIN (4)

## 2024-03-20 NOTE — PROGRESS NOTES
HPI:  Gabriella Jeffries is a 58 year old female who is seen in consultation at the request of Ethel Schmidt PA-C    Pt presents for eval of:   (Onset, Location, L/R, Character, Treatments, Injury if yes)    XR Left foot 3/20/2024, 7/9/2020    DM type 2 - prediabetic dx mid March 2024     Onset Summer 2023,  lateral left foot pain. No injury noted. Presents with supportive athletic shoes.  Constant burning, dull aching, pain 4-7/10. Worse when her feet are cold and sitting. Pain upon arising after rest.  No swelling. No treatments but was on prednisone for a root canal and her foot felt great.      Works at a desk job.      Review of Systems:  Patient denies fever, chills, rash, wound, stiffness, limping, numbness, weakness, heart burn, blood in stool, chest pain with activity, calf pain when walking, shortness of breath with activity, chronic cough, easy bleeding/bruising, swelling of ankles, excessive thirst, fatigue, depression, anxiety.  Patient admits only to symptoms noted in history.     Patient Active Problem List   Diagnosis    Psoriasis    CARDIOVASCULAR SCREENING; LDL GOAL LESS THAN 160    Abdominal pain    IBS (irritable colon syndrome)    Abnormal LFTs    Disorder of bursae and tendons in shoulder region    Left shoulder pain    Esophageal reflux    H/O: hysterectomy    Left elbow pain    Lateral epicondylitis, unspecified laterality    DIANA (generalized anxiety disorder)    Depressive disorder in remission    SBO (small bowel obstruction) (H)    Benign essential hypertension    Impaired fasting glucose    Dysphagia, unspecified type     PAST MEDICAL HISTORY:   Past Medical History:   Diagnosis Date    Bowel obstruction (H) 03/2016    [place]    Disorders of bursae and tendons in shoulder region, unspecified 09/27/2012    Enteritis     Hypertension February 2021    Mild major depression (H24) 03/29/2019    Mixed hyperlipemias     PONV (postoperative nausea and vomiting)     Psoriasis      PAST  SURGICAL HISTORY:   Past Surgical History:   Procedure Laterality Date    ABDOMEN SURGERY  2016    lysis of adhesions for bowel obstruction    C/SECTION, LOW TRANSVERSE      , Low Transverse    COLONOSCOPY      I cant remember the date. A few years ago.    COLONOSCOPY N/A 2023    Procedure: COLONOSCOPY, FLEXIBLE, WITH LESION REMOVAL USING SNARE;  Surgeon: Erika Delgado MD;  Location: [facility] OR    COLONOSCOPY WITH CO2 INSUFFLATION N/A 2023    Procedure: COLONOSCOPY, WITH CO2 INSUFFLATION;  Surgeon: Erika Delgado MD;  Location: [facility] OR    COMBINED ESOPHAGOSCOPY, GASTROSCOPY, DUODENOSCOPY (EGD) WITH CO2 INSUFFLATION N/A 3/11/2024    Procedure: Combined Esophagoscopy, Gastroscopy, Duodenoscopy (Egd) With Co2 Insufflation;  Surgeon: Sushma Burgos DO;  Location: [facility] OR    HYSTERECTOMY, VAGINAL  2006    has ovaries, due to bleeding    ORTHOPEDIC SURGERY  10/2015    Right shoulder    SHOULDER SURGERY Right 10/03/2014    SAD/DCE    SURGICAL HISTORY OF -   2006    rectocele and TVT    SURGICAL HISTORY OF -       left lumpectomy     MEDICATIONS:   Current Outpatient Medications:     Blood Pressure Monitoring (BLOOD PRESSURE MONITOR 3) MICHELLE, 1 Units daily, Disp: 1 each, Rfl: 0    clobetasol (TEMOVATE) 0.05 % external ointment, Apply  topically 2 times daily as needed. Apply sparingly to affected area.  Do not apply to face., Disp: 45 g, Rfl: 1    famotidine (PEPCID) 40 MG tablet, TAKE 1 TABLET(40 MG) BY MOUTH DAILY, Disp: 90 tablet, Rfl: 1    lisinopril (ZESTRIL) 40 MG tablet, Take 1 tablet (40 mg) by mouth daily, Disp: 90 tablet, Rfl: 3    omeprazole (PRILOSEC OTC) 20 MG EC tablet, Take 1 tablet (20 mg) by mouth daily, Disp: 30 tablet, Rfl: 0  ALLERGIES:    Allergies   Allergen Reactions    No Known Drug Allergy      SOCIAL HISTORY:   Social History     Socioeconomic History    Marital status:      Spouse name: Not on file    Number of children: Not on file    Years of education: Not on  file    Highest education level: Not on file   Occupational History    Not on file   Tobacco Use    Smoking status: Never    Smokeless tobacco: Never   Vaping Use    Vaping Use: Never used   Substance and Sexual Activity    Alcohol use: Not Currently     Comment: Seldom; none since Dec 2023.    Drug use: No    Sexual activity: Yes     Partners: Male     Birth control/protection: Female Surgical     Comment: hysterectomy   Other Topics Concern    Parent/sibling w/ CABG, MI or angioplasty before 65F 55M? No   Social History Narrative    Not on file     Social Determinants of Health     Financial Resource Strain: Low Risk  (3/1/2024)    Financial Resource Strain     Within the past 12 months, have you or your family members you live with been unable to get utilities (heat, electricity) when it was really needed?: No   Food Insecurity: Low Risk  (3/1/2024)    Food Insecurity     Within the past 12 months, did you worry that your food would run out before you got money to buy more?: No     Within the past 12 months, did the food you bought just not last and you didn t have money to get more?: No   Transportation Needs: Low Risk  (3/1/2024)    Transportation Needs     Within the past 12 months, has lack of transportation kept you from medical appointments, getting your medicines, non-medical meetings or appointments, work, or from getting things that you need?: No   Physical Activity: Insufficiently Active (3/1/2024)    Exercise Vital Sign     Days of Exercise per Week: 2 days     Minutes of Exercise per Session: 20 min   Stress: No Stress Concern Present (3/1/2024)    [place] [employer] - Occupational Stress Questionnaire     Feeling of Stress : Only a little   Social Connections: Unknown (3/1/2024)    Social Connection and Isolation Panel [NHANES]     Frequency of Communication with Friends and Family: Not on file     Frequency of Social Gatherings with Friends and Family: Patient declined      Attends Mosque Services: Not on file     Active Member of Clubs or Organizations: Not on file     Attends Club or Organization Meetings: Not on file     Marital Status: Not on file   Interpersonal Safety: Low Risk  (3/4/2024)    Interpersonal Safety     Do you feel physically and emotionally safe where you currently live?: Yes     Within the past 12 months, have you been hit, slapped, kicked or otherwise physically hurt by someone?: No     Within the past 12 months, have you been humiliated or emotionally abused in other ways by your partner or ex-partner?: No   Housing Stability: Low Risk  (3/1/2024)    Housing Stability     Do you have housing? : Yes     Are you worried about losing your housing?: No     FAMILY HISTORY:   Family History   Problem Relation Age of Onset    Heart Disease Mother         defibrillator    Cardiovascular Mother         defibrillator    Diabetes Mother     Hyperlipidemia Mother     Cerebrovascular Disease Mother     Anesthesia Reaction Mother     Cardiovascular Father         valve replacement    Hypertension Father     Eye Disorder Father         maccular degeneration    Heart Disease Father         valve replacement    Lipids Father     Hyperlipidemia Father     Genitourinary Problems Brother         CKD    Diabetes Brother     Cancer Brother         leukemia    Other Cancer Brother         Lukemia    Breast Cancer Niece     Other Cancer Niece     Other Cancer Brother         Skin    Asthma Son     C.A.D. No family hx of     Cancer - colorectal No family hx of     Prostate Cancer No family hx of     Alzheimer Disease No family hx of     Arthritis No family hx of     Blood Disease No family hx of     Circulatory No family hx of     Gastrointestinal Disease No family hx of     Musculoskeletal Disorder No family hx of     Neurologic Disorder No family hx of     Respiratory No family hx of     Thyroid Disease No family hx of     Alcohol/Drug No family hx of     Allergies No family hx of   "   Congenital Anomalies No family hx of     Connective Tissue Disorder No family hx of     Depression No family hx of     Anxiety Disorder No family hx of     Mental Illness No family hx of     Substance Abuse No family hx of     Osteoporosis No family hx of     Genetic Disorder No family hx of     Obesity No family hx of      EXAM:Vitals: BP (!) 144/83 (BP Location: Left arm, Patient Position: Sitting, Cuff Size: Adult Regular)   Pulse 80   Ht 1.588 m (5' 2.5\")   Wt 79.4 kg (175 lb)   LMP  (LMP Unknown)   BMI 31.50 kg/m    BMI= Body mass index is 31.5 kg/m .    General appearance: Patient is alert and fully cooperative with history & exam.  No sign of distress is noted during the visit.     Psychiatric: Affect is pleasant & appropriate.  Patient appears motivated to improve health.     Respiratory: Breathing is regular & unlabored while sitting.     HEENT: Hearing is intact to spoken word.  Speech is clear.  No gross evidence of visual impairment that would impact ambulation.       Vascular: DP and PT pulse 2/4 bilateral, CFT immediate, positive digital hair growth bilateral.  Temperature is warm to cool proximal to distal.  Minor varicosities noted bilateral.  No hemosiderin pigmentation.  Minimal if any edema.  No localized edema.     Neurologic: Normal plantar response bilateral.    Pt admits no burning and paraesthesias about the feet and toes with palpation.     Dermatologic: Toenails are in reasonable condition.  Reasonable texture turgor tone about the integument dorsally but there is multiple nucleated hyperkeratotic lesions on the plantar lateral right foot.  Heavy hyperkeratosis noted on the right foot plantarly.     Musculoskeletal: Patient is ambulatory without assistive device or brace.  There is semi reducible contracture of the lesser digits.  Mildly limited range of motion through the talus navicular calcaneus navicular joint midfoot of the left foot and ankle.  Pain with any palpation through " the sinus tarsi over the anterior process of the left calcaneus.  Subtle discomfort along the course of the peroneal tendons but no edema and no weakness is noted.  She is able to perform unilateral balance on the left but not toe raise on the left.  She is able to perform unilateral toe raise on the right.    Hemoglobin A1C (%)   Date Value   03/04/2024 6.3 (H)     Creatinine (mg/dL)   Date Value   03/04/2024 0.74   11/16/2022 0.71   06/02/2021 0.80   01/06/2021 0.71   03/18/2020 0.56   11/15/2019 0.69   03/02/2016 0.71   12/08/2011 0.75     ASSESSMENT:       ICD-10-CM    1. 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 Peroneal tendonitis of left lower extremity  M76.72 Orthotics, Mastectomy and Custom Compression Orders      3. Dermatitis aka ILVEN  L30.9            PLAN:    3/20/2024  Recommended fracture boot for 1 month  Begin order for orthotics to provide stability  Discussed injection oral anti-inflammatory a period of immobilization  Also discussed surgical reconstruction and excision of the tarsal coalition.  However she has had no symptoms prior to last fall summer and she would like to exhaust conservative options first.  We reviewed surgical excision of the tarsal coalition and postoperative cares and expectations and potential risk and complications as well.  Follow-up in 1 month  Activities as tolerated in the fracture boot        Carlos Camarillo DPM

## 2024-03-20 NOTE — PATIENT INSTRUCTIONS
Tarsal coalition specifically calcaneus navicular coalition or synostosis.    Secondary peroneal tendonitis trying to stop movement about the coalition.   Stay in boot for WB activity for one month   Obtain orthotics       Dermatology Dr Trujillo  Calluses, Corns, IPKs, Porokeratosis    When there is excessive friction or pressure on the skin, the body responds by making the skin thicker.  While this may protect the deeper structures, the thickened skin can take up more space and thus increase pressure over a bony prominence or become an open sore or skin ulcer as this skin becomes less flexible.    Flat, diffuse thickening are simple calluses and they are usually caused by friction.  Often these are the result of rubbing on a shoe or going barefoot.    Calluses with a central core between the toes are called corns.  These result from prominent joints on adjacent toes rubbing together.  Theses are a symptom of bone malalignment and will always recur unless the underlying bones are addressed surgically.    Most of these lesions can be kept comfortable with routine maintenance. This consists of filing them with a Ped Egg, callus file, or 120 grit sandpaper on a block, every day during your bath or shower.  Most people prefer battery operated ellsworth such as an Amope', Personal Pedi and Emjoi for regular use or heavy painful callouses.  Heavy creams or ointments can be applied 1-2 times every day to keep them soft. Toe spacers can be used for corns, gel pads can be used for other lesions on the bottom of the foot. If there is a deformity noted, such as a prominent bone, often this can be addressed to minimize recurrence. However, sometimes the pressure and lesion simply migrates to another spot after surgery, so it is not a guaranteed cure.     www.pedifix.com is the best source for all sorts of foot pads and cushions      If you have severe callouses and cracking, you may apply heavy greasy cream or ointments that you  scoop up such as Cetaphil cream, Eucerin, Aquaphor or Vaseline.  Be sure to obtain cream or ointment in these brands and not lotion (lotion is water based and not durable enough for feet). For more aggressive help apply heavy creams or ointment under occlusive dressings such as Saran Wrap or Jelly Feet while sleeping.   Jelly Feet can be obtained at www.jellyfeet.com.     To be successful with managing hyperkeratotic skin, you must manage hygiene daily.  At your bath or shower time is the easiest time to work on this.  There is no medical or surgical treatment that will absolutely eliminate many of these and symptoms.    Please call with any additional questions.

## 2024-03-20 NOTE — NURSING NOTE
Dispensed 1 Pneumatic Walking Brace, Size medium, with [facility] agreement signed by patient. Gayathri Diana CMA, March 20, 2024

## 2024-03-29 ENCOUNTER — ANCILLARY PROCEDURE (OUTPATIENT)
Dept: MAMMOGRAPHY | Facility: CLINIC | Age: 59
End: 2024-03-29
Attending: PHYSICIAN ASSISTANT
Payer: COMMERCIAL

## 2024-03-29 DIAGNOSIS — Z12.31 ENCOUNTER FOR SCREENING MAMMOGRAM FOR MALIGNANT NEOPLASM OF BREAST: ICD-10-CM

## 2024-03-29 PROCEDURE — 77067 SCR MAMMO BI INCL CAD: CPT | Mod: GC

## 2024-03-29 PROCEDURE — 77063 BREAST TOMOSYNTHESIS BI: CPT | Mod: GC

## 2024-04-11 ENCOUNTER — TRANSFERRED RECORDS (OUTPATIENT)
Dept: HEALTH INFORMATION MANAGEMENT | Facility: CLINIC | Age: 59
End: 2024-04-11
Payer: COMMERCIAL

## 2024-04-23 ENCOUNTER — OFFICE VISIT (OUTPATIENT)
Dept: FAMILY MEDICINE | Facility: CLINIC | Age: 59
End: 2024-04-23
Payer: COMMERCIAL

## 2024-04-23 VITALS
HEART RATE: 86 BPM | BODY MASS INDEX: 30.55 KG/M2 | TEMPERATURE: 98.1 F | DIASTOLIC BLOOD PRESSURE: 80 MMHG | WEIGHT: 172.4 LBS | SYSTOLIC BLOOD PRESSURE: 122 MMHG | HEIGHT: 63 IN | OXYGEN SATURATION: 98 %

## 2024-04-23 DIAGNOSIS — M25.572 PAIN IN JOINT INVOLVING ANKLE AND FOOT, LEFT: ICD-10-CM

## 2024-04-23 DIAGNOSIS — I10 BENIGN ESSENTIAL HYPERTENSION: ICD-10-CM

## 2024-04-23 DIAGNOSIS — Q66.89 COALITION, CALCANEUS NAVICULAR: ICD-10-CM

## 2024-04-23 DIAGNOSIS — Z01.818 PREOP GENERAL PHYSICAL EXAM: Primary | ICD-10-CM

## 2024-04-23 PROCEDURE — 99214 OFFICE O/P EST MOD 30 MIN: CPT | Performed by: FAMILY MEDICINE

## 2024-04-23 RX ORDER — LISINOPRIL 40 MG/1
40 TABLET ORAL DAILY
Qty: 90 TABLET | Refills: 3 | Status: SHIPPED | OUTPATIENT
Start: 2024-04-23

## 2024-04-23 ASSESSMENT — PAIN SCALES - GENERAL: PAINLEVEL: MODERATE PAIN (4)

## 2024-04-23 NOTE — PROGRESS NOTES
"  {PROVIDER CHARTING PREFERENCE:190071}    Zeeshan Quiroz is a 58 year old, presenting for the following health issues:  Pre-Op Exam        4/23/2024    10:11 AM   Additional Questions   Roomed by Shivani GOTTI   Accompanied by None         4/23/2024    10:11 AM   Patient Reported Additional Medications   Patient reports taking the following new medications NA     Pt needs refill of lisinopril     2 notes, pre-op listed below     Pt would Hepatic labs done today     HPI     {MA/LPN/RN Pre-Provider Visit Orders- hCG/UA/Strep (Optional):914274}    Hypertension Follow-up    Do you check your blood pressure regularly outside of the clinic? Yes   Are you following a low salt diet? Yes  Are your blood pressures ever more than 140 on the top number (systolic) OR more   than 90 on the bottom number (diastolic), for example 140/90? Yes    {additonal problems for provider to add (Optional):407409}    {ROS Picklists (Optional):421793}      Objective    /80   Pulse 86   Temp 98.1  F (36.7  C) (Temporal)   Ht 1.588 m (5' 2.52\")   Wt 78.2 kg (172 lb 6.4 oz)   LMP  (LMP Unknown)   SpO2 98%   BMI 31.01 kg/m    Body mass index is 31.01 kg/m .  Physical Exam   {Exam List (Optional):342331}    {Diagnostic Test Results (Optional):514773}        Signed Electronically by: Erika Delgado MD  {Email feedback regarding this note to [email or web address]   :[id number]}  "

## 2024-04-23 NOTE — PROGRESS NOTES
"Preoperative Evaluation  [facility]  [address], [address]  [place] [address]  Phone: [phone]  Fax: [phone]  Primary Provider: Erika Paula  Pre-op Performing Provider: ERIKA PAULA  Apr 23, 2024       Gabriella is a 58 year old, presenting for the following:  Pre-Op Exam        4/23/2024    10:11 AM   Additional Questions   Roomed by Shivani GOTTI   Accompanied by None         4/23/2024    10:11 AM   Patient Reported Additional Medications   Patient reports taking the following new medications NA     Surgical Information  Surgery/Procedure: \"Removing extra bones\"   Surgery Location: [facility]   Surgeon: Dr. Parker Albarado   Surgery Date: 4/30/24  Time of Surgery: TBD  Where patient plans to recover: At home with family  Fax number for surgical facility: [phone]    Assessment & Plan     The proposed surgical procedure is considered INTERMEDIATE risk.    Preop general physical exam  Pain in joint involving ankle and foot, left  Coalition, calcaneus navicular  Patient is here for preoperative evaluation.   She is cleared for procedure.     Benign essential hypertension  Doing well. Well controlled. Tolerating medication.  No change in plan.    - lisinopril (ZESTRIL) 40 MG tablet; Take 1 tablet (40 mg) by mouth daily            - No identified additional risk factors other than previously addressed    Antiplatelet or Anticoagulation Medication Instructions   - Patient is on no antiplatelet or anticoagulation medications.    Additional Medication Instructions  Patient is to take all scheduled medications on the day of surgery    Recommendation  APPROVAL GIVEN to proceed with proposed procedure, without further diagnostic evaluation.          Subjective       HPI related to upcoming procedure: has had issues with her left foot/ankle for years. She has been wearing a walking boot. Is scheduled for surgery for correction.         4/22/2024    12:26 PM   Preop Questions   1. " Have you ever had a heart attack or stroke? No   2. Have you ever had surgery on your heart or blood vessels, such as a stent placement, a coronary artery bypass, or surgery on an artery in your head, neck, heart, or legs? No   3. Do you have chest pain with activity? No   4. Do you have a history of  heart failure? No   5. Do you currently have a cold, bronchitis or symptoms of other infection? No   6. Do you have a cough, shortness of breath, or wheezing? No   7. Do you or anyone in your family have previous history of blood clots? No   8. Do you or does anyone in your family have a serious bleeding problem such as prolonged bleeding following surgeries or cuts? No   9. Have you ever had problems with anemia or been told to take iron pills? No   10. Have you had any abnormal blood loss such as black, tarry or bloody stools, or abnormal vaginal bleeding? No   11. Have you ever had a blood transfusion? No   12. Are you willing to have a blood transfusion if it is medically needed before, during, or after your surgery? Yes   13. Have you or any of your relatives ever had problems with anesthesia? YES - patient notes nausea/vomiting with anesthesia   14. Do you have sleep apnea, excessive snoring or daytime drowsiness? No   15. Do you have any artifical heart valves or other implanted medical devices like a pacemaker, defibrillator, or continuous glucose monitor? No   16. Do you have artificial joints? No   17. Are you allergic to latex? No   18. Is there any chance that you may be pregnant? No       Health Care Directive  Patient does not have a Health Care Directive or Living Will: Discussed advance care planning with patient; information given to patient to review.    Preoperative Review of    reviewed - no record of controlled substances prescribed.      Status of Chronic Conditions:  HYPERTENSION - Patient has longstanding history of HTN , currently denies any symptoms referable to elevated blood pressure.  Specifically denies chest pain, palpitations, dyspnea, orthopnea, PND or peripheral edema. Blood pressure readings have been in normal range. Current medication regimen is as listed below. Patient denies any side effects of medication.     Patient Active Problem List    Diagnosis Date Noted    Impaired fasting glucose 2024     Priority: Medium    Dysphagia, unspecified type 2024     Priority: Medium    Benign essential hypertension 2024     Priority: Medium    Depressive disorder in remission 2020     Priority: Medium    DIANA (generalized anxiety disorder) 2019     Priority: Medium    SBO (small bowel obstruction) (H) 2016     Priority: Medium    Left elbow pain 2015     Priority: Medium    Lateral epicondylitis, unspecified laterality 2015     Priority: Medium    H/O: hysterectomy 12/10/2013     Priority: Medium    Left shoulder pain 2012     Priority: Medium    Disorder of bursae and tendons in shoulder region 2012     Priority: Medium     Problem list name updated by automated process. Provider to review      IBS (irritable colon syndrome) 2012     Priority: Medium    Abnormal LFTs 2012     Priority: Medium    Esophageal reflux 2011     Priority: Medium    Abdominal pain 2011     Priority: Medium    CARDIOVASCULAR SCREENING; LDL GOAL LESS THAN 160 10/31/2010     Priority: Medium    Psoriasis 2010     Priority: Medium      Past Medical History:   Diagnosis Date    Bowel obstruction (H) 2016    [place]    Disorders of bursae and tendons in shoulder region, unspecified 2012    Enteritis     Hypertension 2021    Mild major depression (H24) 2019    Mixed hyperlipemias     PONV (postoperative nausea and vomiting)     Psoriasis      Past Surgical History:   Procedure Laterality Date    ABDOMEN SURGERY  2016    lysis of adhesions for bowel obstruction    C/SECTION, LOW TRANSVERSE      , Low  "Transverse    COLONOSCOPY      I cant remember the date. A few years ago.    COLONOSCOPY N/A 2/17/2023    Procedure: COLONOSCOPY, FLEXIBLE, WITH LESION REMOVAL USING SNARE;  Surgeon: Erika Delgado MD;  Location: [facility] OR    COLONOSCOPY WITH CO2 INSUFFLATION N/A 2/17/2023    Procedure: COLONOSCOPY, WITH CO2 INSUFFLATION;  Surgeon: Erika Delgado MD;  Location: [facility] OR    COMBINED ESOPHAGOSCOPY, GASTROSCOPY, DUODENOSCOPY (EGD) WITH CO2 INSUFFLATION N/A 3/11/2024    Procedure: Combined Esophagoscopy, Gastroscopy, Duodenoscopy (Egd) With Co2 Insufflation;  Surgeon: Sushma Burgos DO;  Location: [facility] OR    HYSTERECTOMY, VAGINAL  2006    has ovaries, due to bleeding    ORTHOPEDIC SURGERY  10/2015    Right shoulder    SHOULDER SURGERY Right 10/03/2014    SAD/DCE    SURGICAL HISTORY OF -   2006    rectocele and TVT    SURGICAL HISTORY OF -       left lumpectomy     Current Outpatient Medications   Medication Sig Dispense Refill    Blood Pressure Monitoring (BLOOD PRESSURE MONITOR 3) MICHELLE 1 Units daily 1 each 0    clobetasol (TEMOVATE) 0.05 % external ointment Apply  topically 2 times daily as needed. Apply sparingly to affected area.  Do not apply to face. 45 g 1    famotidine (PEPCID) 40 MG tablet TAKE 1 TABLET(40 MG) BY MOUTH DAILY 90 tablet 1    lisinopril (ZESTRIL) 40 MG tablet Take 1 tablet (40 mg) by mouth daily 90 tablet 3    omeprazole (PRILOSEC OTC) 20 MG EC tablet Take 1 tablet (20 mg) by mouth daily 30 tablet 0       Allergies   Allergen Reactions    No Known Drug Allergy         Social History     Tobacco Use    Smoking status: Never    Smokeless tobacco: Never   Substance Use Topics    Alcohol use: Yes     Comment: Seldom       History   Drug Use No         Review of Systems    Review of Systems  Constitutional, HEENT, cardiovascular, pulmonary, gi and gu systems are negative, except as otherwise noted.    Objective    /80   Pulse 86   Temp 98.1  F (36.7  C) (Temporal)   Ht 1.588 m (5' 2.52\")   " "Wt 78.2 kg (172 lb 6.4 oz)   LMP  (LMP Unknown)   SpO2 98%   BMI 31.01 kg/m     Estimated body mass index is 31.01 kg/m  as calculated from the following:    Height as of this encounter: 1.588 m (5' 2.52\").    Weight as of this encounter: 78.2 kg (172 lb 6.4 oz).  Physical Exam  GENERAL: alert and no distress  EYES: Eyes grossly normal to inspection, PERRL and conjunctivae and sclerae normal  HENT: ear canals and TM's normal, nose and mouth without ulcers or lesions  NECK: no adenopathy, no asymmetry, masses, or scars  RESP: lungs clear to auscultation - no rales, rhonchi or wheezes  CV: regular rate and rhythm, normal S1 S2, no S3 or S4, no murmur, click or rub, no peripheral edema  ABDOMEN: soft, nontender, no hepatosplenomegaly, no masses and bowel sounds normal  MS: no edema  SKIN: no suspicious lesions or rashes  NEURO: Normal strength and tone, mentation intact and speech normal  PSYCH: mentation appears normal, affect normal/bright    Recent Labs   Lab Test 03/04/24  1550 11/16/22  1601   HGB 13.2 13.5    241    140   POTASSIUM 4.3 3.6   CR 0.74 0.71   A1C 6.3*  --         Diagnostics  No labs were ordered during this visit.   No EKG required, no history of coronary heart disease, significant arrhythmia, peripheral arterial disease or other structural heart disease.    Revised Cardiac Risk Index (RCRI)  The patient has the following serious cardiovascular risks for perioperative complications:   - No serious cardiac risks = 0 points     RCRI Interpretation: 0 points: Class I (very low risk - 0.4% complication rate)         Signed Electronically by: Erika Delgado MD  Copy of this evaluation report is provided to requesting physician.         " details… normal/soft/nontender/nondistended/normal active bowel sounds

## 2024-04-23 NOTE — PATIENT INSTRUCTIONS
Preparing for Your Surgery  Getting started  A nurse will call you to review your health history and instructions. They will give you an arrival time based on your scheduled surgery time. Please be ready to share:  Your doctor's clinic name and phone number  Your medical, surgical, and anesthesia history  A list of allergies and sensitivities  A list of medicines, including herbal treatments and over-the-counter drugs  Whether the patient has a legal guardian (ask how to send us the papers in advance)  Please tell us if you're pregnant--or if there's any chance you might be pregnant. Some surgeries may injure a fetus (unborn baby), so they require a pregnancy test. Surgeries that are safe for a fetus don't always need a test, and you can choose whether to have one.   If you have a child who's having surgery, please ask for a copy of Preparing for Your Child's Surgery.    Preparing for surgery  Within 10 to 30 days of surgery: Have a pre-op exam (sometimes called an H&P, or History and Physical). This can be done at a clinic or pre-operative center.  If you're having a , you may not need this exam. Talk to your care team.  At your pre-op exam, talk to your care team about all medicines you take. If you need to stop any medicines before surgery, ask when to start taking them again.  We do this for your safety. Many medicines can make you bleed too much during surgery. Some change how well surgery (anesthesia) drugs work.  Call your insurance company to let them know you're having surgery. (If you don't have insurance, call [phone].)  Call your clinic if there's any change in your health. This includes signs of a cold or flu (sore throat, runny nose, cough, rash, fever). It also includes a scrape or scratch near the surgery site.  If you have questions on the day of surgery, call your hospital or surgery center.  Eating and drinking guidelines  For your safety: Unless your surgeon tells you otherwise,  follow the guidelines below.  Eat and drink as usual until 8 hours before you arrive for surgery. After that, no food or milk.  Drink clear liquids until 2 hours before you arrive. These are liquids you can see through, like water, Gatorade, and Propel Water. They also include plain black coffee and tea (no cream or milk), candy, and breath mints. You can spit out gum when you arrive.  If you drink alcohol: Stop drinking it the night before surgery.  If your care team tells you to take medicine on the morning of surgery, it's okay to take it with a sip of water.  Preventing infection  Shower or bathe the night before and morning of your surgery. Follow the instructions your clinic gave you. (If no instructions, use regular soap.)  Don't shave or clip hair near your surgery site. We'll remove the hair if needed.  Don't smoke or vape the morning of surgery. You may chew nicotine gum up to 2 hours before surgery. A nicotine patch is okay.  Note: Some surgeries require you to completely quit smoking and nicotine. Check with your surgeon.  Your care team will make every effort to keep you safe from infection. We will:  Clean our hands often with soap and water (or an alcohol-based hand rub).  Clean the skin at your surgery site with a special soap that kills germs.  Give you a special gown to keep you warm. (Cold raises the risk of infection.)  Wear special hair covers, masks, gowns and gloves during surgery.  Give antibiotic medicine, if prescribed. Not all surgeries need antibiotics.  What to bring on the day of surgery  Photo ID and insurance card  Copy of your health care directive, if you have one  Glasses and hearing aids (bring cases)  You can't wear contacts during surgery  Inhaler and eye drops, if you use them (tell us about these when you arrive)  CPAP machine or breathing device, if you use them  A few personal items, if spending the night  If you have . . .  A pacemaker, ICD (cardiac defibrillator) or other  implant: Bring the ID card.  An implanted stimulator: Bring the remote control.  A legal guardian: Bring a copy of the certified (court-stamped) guardianship papers.  Please remove any jewelry, including body piercings. Leave jewelry and other valuables at home.  If you're going home the day of surgery  You must have a responsible adult drive you home. They should stay with you overnight as well.  If you don't have someone to stay with you, and you aren't safe to go home alone, we may keep you overnight. Insurance often won't pay for this.  After surgery  If it's hard to control your pain or you need more pain medicine, please call your surgeon's office.  Questions?   If you have any questions for your care team, list them here: _________________________________________________________________________________________________________________________________________________________________________ ____________________________________ ____________________________________ ____________________________________  For informational purposes only. Not to replace the advice of your health care provider. Copyright   2003, 2019 [employer] [facility]. All rights reserved. Clinically reviewed by Ivelisse Estes MD. [employer] 375601 - REV 12/22.    How to Take Your Medication Before Surgery  - Take all of your medications before surgery as usual

## 2024-05-16 ENCOUNTER — TRANSFERRED RECORDS (OUTPATIENT)
Dept: HEALTH INFORMATION MANAGEMENT | Facility: CLINIC | Age: 59
End: 2024-05-16
Payer: COMMERCIAL

## 2024-06-17 ENCOUNTER — TRANSFERRED RECORDS (OUTPATIENT)
Dept: HEALTH INFORMATION MANAGEMENT | Facility: CLINIC | Age: 59
End: 2024-06-17
Payer: COMMERCIAL

## 2024-07-25 ENCOUNTER — TRANSFERRED RECORDS (OUTPATIENT)
Dept: HEALTH INFORMATION MANAGEMENT | Facility: CLINIC | Age: 59
End: 2024-07-25
Payer: COMMERCIAL

## 2024-07-30 NOTE — TELEPHONE ENCOUNTER
REASON FOR VISIT: right knee pain    DATE OF APPT: 8/10/24   NOTES (FOR ALL VISITS) STATUS DETAILS   OFFICE NOTE from referring provider N/A    MEDICATION LIST Internal    IMAGING  (FOR ALL VISITS)     XR Internal [facility]  XR Knee right 5/12/2022   MRI (HEAD, NECK, SPINE) Internal [facility] [place]  MR Knee right 6/06/2022   CT (HEAD, NECK, SPINE) N/A

## 2024-08-07 NOTE — PROGRESS NOTES
"  [facility]  SPORTS MEDICINE CLINIC VISIT     Aug 10, 2024        ASSESSMENT & PLAN    58-year-old with acute exacerbation of medial meniscus injury in the right knee    Reviewed imaging and assessment with patient in detail  Discussed bracing and home exercise regimen.  We discussed use of topical anti-inflammatory such as diclofenac gel.  After discussion the patient opted for repeat steroid injection.  See procedure note for details.  We discussed that this could be repeated as often as every 3 months but no sooner than needed based on symptoms.    Albaro Ojeda MD  [facility] SPORTS MEDICINE [facility]    -----  Chief Complaint   Patient presents with    Right Knee - Pain       SUBJECTIVE  Gabriella Jeffries is a/an 58 year old female who is seen as a self referral for evaluation of right knee pain.     The patient is seen by themselves.  The patient is Right handed    Onset: A few years years(s) ago. Around 6 months to a year it has been getting worse pain gradually happened over time, Patient mentioned a few weeks ago her dog ran into her knee and she felt a \"pop\"  Location of Pain: right knee, Patient states that the pain is on the back and medial side of the knee  Worsened by: walking up and down and the stairs, bending the knee, sitting  For a long time  Better with: elevate, rests, bracing   Treatments tried: rest/activity avoidance, elevation, and Tylenol occasionally  Associated symptoms: swelling    Orthopedic/Surgical history: steroid shot in 2022 and had 2 years of relief  Social History/Occupation: desk job, 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 No  Do you have any vision problems? No  Do you have any chest pain or edema? No  Do you have any shortness of breath or wheezing?  No  Do you have stomach problems? No  Do you have any numbness or focal weakness? No  Do you have diabetes? No  Do you have problems with " bleeding or clotting? No  Do you have an rashes or other skin lesions? No    OBJECTIVE:  LMP  (LMP Unknown)      Patient is alert, No acute distress, pleasant and conversational.    Gait: nonantalgic. Normal heel toe gait.      right knee:   Skin intact. No erythema or ecchymosis.  No effusion or soft tissue swelling.    AROM: Zero to approximately 135  with pain on terminal flexion    Palpation: No medial or lateral facet joint tenderness.  TTP over the posterior medial joint line.    Special Tests:  Positive Mazin's for pain  No ligamentous laxity or pain with valgus or varus stress.  Negative Lachman's, Anterior Drawer and Posterior Drawer     Full Isometric quad strength, extensor mechanism in place     Neurovascularly intact in the lower extremity      RADIOLOGY:    4 view x-rays of the right knee are performed and reviewed independently demonstrate no acute fracture or dislocation.  Moderate DJD in the medial compartment that is not slightly progressed compared to previous otherwise minimal degenerative change.  See EMR for formal radiology report.         Large Joint Injection/Arthocentesis: R knee joint    Date/Time: 8/10/2024 10:26 AM    Performed by: Albaro Ojeda MD  Authorized by: Albrao Ojeda MD    Indications:  Pain  Needle Size:  22 G  Guidance: landmark guided    Location:  Knee      Medications:  40 mg triamcinolone 40 MG/ML  Outcome:  Tolerated well, no immediate complications  Procedure discussed: discussed risks, benefits, and alternatives    Consent Given by:  Patient  Timeout: timeout called immediately prior to procedure    Prep: patient was prepped and draped in usual sterile fashion     There were no complications. The patient tolerated the procedure well. There was minimal bleeding.   The patient was instructed to ice the knee upon leaving clinic and refrain from overuse over the next 2 days.   The patient was instructed to go to the emergency room with any unusual  pain, swelling, or redness occurred in the injected area.     Albaro Ojeda MD

## 2024-08-08 DIAGNOSIS — M25.561 RIGHT KNEE PAIN: Primary | ICD-10-CM

## 2024-08-10 ENCOUNTER — OFFICE VISIT (OUTPATIENT)
Dept: ORTHOPEDICS | Facility: CLINIC | Age: 59
End: 2024-08-10
Payer: COMMERCIAL

## 2024-08-10 ENCOUNTER — ANCILLARY PROCEDURE (OUTPATIENT)
Dept: GENERAL RADIOLOGY | Facility: CLINIC | Age: 59
End: 2024-08-10
Attending: FAMILY MEDICINE
Payer: COMMERCIAL

## 2024-08-10 ENCOUNTER — PRE VISIT (OUTPATIENT)
Dept: ORTHOPEDICS | Facility: CLINIC | Age: 59
End: 2024-08-10

## 2024-08-10 DIAGNOSIS — M25.561 RIGHT KNEE PAIN: ICD-10-CM

## 2024-08-10 DIAGNOSIS — M25.561 ACUTE PAIN OF RIGHT KNEE: Primary | ICD-10-CM

## 2024-08-10 PROCEDURE — 73564 X-RAY EXAM KNEE 4 OR MORE: CPT | Mod: RT | Performed by: RADIOLOGY

## 2024-08-10 PROCEDURE — 99213 OFFICE O/P EST LOW 20 MIN: CPT | Mod: 25 | Performed by: FAMILY MEDICINE

## 2024-08-10 PROCEDURE — 20610 DRAIN/INJ JOINT/BURSA W/O US: CPT | Mod: RT | Performed by: FAMILY MEDICINE

## 2024-08-10 RX ORDER — TRIAMCINOLONE ACETONIDE 40 MG/ML
40 INJECTION, SUSPENSION INTRA-ARTICULAR; INTRAMUSCULAR
Status: SHIPPED | OUTPATIENT
Start: 2024-08-10

## 2024-08-10 RX ADMIN — TRIAMCINOLONE ACETONIDE 40 MG: 40 INJECTION, SUSPENSION INTRA-ARTICULAR; INTRAMUSCULAR at 10:26

## 2024-08-10 NOTE — LETTER
"8/10/2024      Gabriella Jeffries  [address] [place] [address]      Dear Colleague,    Thank you for referring your patient, Gabriella Jeffries, to the [facility] SPORTS MEDICINE [facility]. Please see a copy of my visit note below.      [facility]  SPORTS MEDICINE CLINIC VISIT     Aug 10, 2024        ASSESSMENT & PLAN    58-year-old with acute exacerbation of medial meniscus injury in the right knee    Reviewed imaging and assessment with patient in detail  Discussed bracing and home exercise regimen.  We discussed use of topical anti-inflammatory such as diclofenac gel.  After discussion the patient opted for repeat steroid injection.  See procedure note for details.  We discussed that this could be repeated as often as every 3 months but no sooner than needed based on symptoms.    Albaro Ojeda MD  [facility]    -----  Chief Complaint   Patient presents with     Right Knee - Pain       SUBJECTIVE  Gabriella Jeffries is a/an 58 year old female who is seen as a self referral for evaluation of right knee pain.     The patient is seen by themselves.  The patient is Right handed    Onset: A few years years(s) ago. Around 6 months to a year it has been getting worse pain gradually happened over time, Patient mentioned a few weeks ago her dog ran into her knee and she felt a \"pop\"  Location of Pain: right knee, Patient states that the pain is on the back and medial side of the knee  Worsened by: walking up and down and the stairs, bending the knee, sitting  For a long time  Better with: elevate, rests, bracing   Treatments tried: rest/activity avoidance, elevation, and Tylenol occasionally  Associated symptoms: swelling    Orthopedic/Surgical history: steroid shot in 2022 and had 2 years of relief  Social History/Occupation: desk job, 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 No  Do you have any " vision problems? No  Do you have any chest pain or edema? No  Do you have any shortness of breath or wheezing?  No  Do you have stomach problems? No  Do you have any numbness or focal weakness? No  Do you have diabetes? No  Do you have problems with bleeding or clotting? No  Do you have an rashes or other skin lesions? No    OBJECTIVE:  LMP  (LMP Unknown)      Patient is alert, No acute distress, pleasant and conversational.    Gait: nonantalgic. Normal heel toe gait.      right knee:   Skin intact. No erythema or ecchymosis.  No effusion or soft tissue swelling.    AROM: Zero to approximately 135  with pain on terminal flexion    Palpation: No medial or lateral facet joint tenderness.  TTP over the posterior medial joint line.    Special Tests:  Positive Mazin's for pain  No ligamentous laxity or pain with valgus or varus stress.  Negative Lachman's, Anterior Drawer and Posterior Drawer     Full Isometric quad strength, extensor mechanism in place     Neurovascularly intact in the lower extremity      RADIOLOGY:    4 view x-rays of the right knee are performed and reviewed independently demonstrate no acute fracture or dislocation.  Moderate DJD in the medial compartment that is not slightly progressed compared to previous otherwise minimal degenerative change.  See EMR for formal radiology report.         Large Joint Injection/Arthocentesis: R knee joint    Date/Time: 8/10/2024 10:26 AM    Performed by: Albaro Ojeda MD  Authorized by: Albaro Ojeda MD    Indications:  Pain  Needle Size:  22 G  Guidance: landmark guided    Location:  Knee      Medications:  40 mg triamcinolone 40 MG/ML  Outcome:  Tolerated well, no immediate complications  Procedure discussed: discussed risks, benefits, and alternatives    Consent Given by:  Patient  Timeout: timeout called immediately prior to procedure    Prep: patient was prepped and draped in usual sterile fashion     There were no complications. The  patient tolerated the procedure well. There was minimal bleeding.   The patient was instructed to ice the knee upon leaving clinic and refrain from overuse over the next 2 days.   The patient was instructed to go to the emergency room with any unusual pain, swelling, or redness occurred in the injected area.     Albaro Ojeda MD                Again, thank you for allowing me to participate in the care of your patient.        Sincerely,        Albaro Ojeda MD

## 2024-08-10 NOTE — NURSING NOTE
[facility]   ORTHOPEDICS & SPORTS MEDICINE  [address] [place] [address]  Dept: [phone]  ______________________________________________________________________________    Patient: Gabriella Jeffries   : 1965   MRN: [id number]   August 10, 2024    INVASIVE PROCEDURE SAFETY CHECKLIST    Date: 8/10/24   Procedure:Right Knee Injection  Patient Name: Gabriella Jeffries  MRN: [id number]  YOB: 1965    Action: Complete sections as appropriate. Any discrepancy results in a HARD COPY until resolved.     PRE PROCEDURE:  Patient ID verified with 2 identifiers (name and  or MRN): Yes  Procedure and site verified with patient/designee (when able): Yes  Accurate consent documentation in medical record: Yes  H&P (or appropriate assessment) documented in medical record: NA  H&P must be up to 20 days prior to procedure and updates within 24 hours of procedure as applicable: NA  Relevant diagnostic and radiology test results appropriately labeled and displayed as applicable: Yes  Procedure site(s) marked with provider initials: Yes    TIMEOUT:  Time-Out performed immediately prior to starting procedure, including verbal and active participation of all team members addressing the following:Yes  * Correct patient identify  * Confirmed that the correct side and site are marked  * An accurate procedure consent form  * Agreement on the procedure to be done  * Correct patient position  * Relevant images and results are properly labeled and appropriately displayed  * The need to administer antibiotics or fluids for irrigation purposes during the procedure as applicable   * Safety precautions based on patient history or medication use    DURING PROCEDURE: Verification of correct person, site, and procedures any time the responsibility for care of the patient is transferred to another member of the care team.       Prior to injection, verified patient identity using patient's name and date of  birth.  Due to injection administration, patient instructed to remain in clinic for 15 minutes  afterwards, and to report any adverse reaction to me immediately.    Joint injection was performed.  Right Knee Injection    Drug Amount Wasted:  None.  Vial/Syringe: Single dose vial  Expiration Date:  03-      Kimberly Trejo LPN  August 10, 2024

## 2024-12-27 NOTE — LETTER
3/20/2024         RE: Gabriella Jeffries  [address] [place] [address]        Dear Colleague,    Thank you for referring your patient, Gabriella Jeffries, to the [facility]. Please see a copy of my visit note below.    HPI:  Gabriella Jeffries is a 58 year old female who is seen in consultation at the request of Ethel Schmidt PA-C    Pt presents for eval of:   (Onset, Location, L/R, Character, Treatments, Injury if yes)    XR Left foot 3/20/2024, 7/9/2020    DM type 2 - prediabetic dx mid March 2024     Onset Summer 2023,  lateral left foot pain. No injury noted. Presents with supportive athletic shoes.  Constant burning, dull aching, pain 4-7/10. Worse when her feet are cold and sitting. Pain upon arising after rest.  No swelling. No treatments but was on prednisone for a root canal and her foot felt great.      Works at a desk job.      Review of Systems:  Patient denies fever, chills, rash, wound, stiffness, limping, numbness, weakness, heart burn, blood in stool, chest pain with activity, calf pain when walking, shortness of breath with activity, chronic cough, easy bleeding/bruising, swelling of ankles, excessive thirst, fatigue, depression, anxiety.  Patient admits only to symptoms noted in history.     Patient Active Problem List   Diagnosis     Psoriasis     CARDIOVASCULAR SCREENING; LDL GOAL LESS THAN 160     Abdominal pain     IBS (irritable colon syndrome)     Abnormal LFTs     Disorder of bursae and tendons in shoulder region     Left shoulder pain     Esophageal reflux     H/O: hysterectomy     Left elbow pain     Lateral epicondylitis, unspecified laterality     DIANA (generalized anxiety disorder)     Depressive disorder in remission     SBO (small bowel obstruction) (H)     Benign essential hypertension     Impaired fasting glucose     Dysphagia, unspecified type     PAST MEDICAL HISTORY:   Past Medical History:   Diagnosis Date     Bowel obstruction (H) 03/2016    [place]  Vicky Eldridge is a 10 year old female who was brought in for this visit.  History was provided by the mother.  HPI:     Chief Complaint   Patient presents with    Urgent Care F/u   Saw ENT on 12/13/24 and dx with effusions bilat; she had finished amox a few weeks before; he put her on cefdinir (7 days); she finished on 12/22  On 12/24 - cheek rash, body rash, swollen knuckles; sore feet and knees; seen in Urgent Care, dx with serum sickness; placed on steroid; she is improving nicely as of today      History reviewed. No pertinent past medical history.  History reviewed. No pertinent surgical history.  Medications Ordered Prior to Encounter[1]  Allergies  Allergies[2]  ROS:  See HPI:  she feels her hearing is good today; no runny nose; no cough; no sore throat; no ear pain; no vomiting or diarrhea; no rashes; drinking well; eating as much as usual    PHYSICAL EXAM:   Pulse 88   Temp 97.2 °F (36.2 °C) (Tympanic)   Resp 21   Wt 31.8 kg (70 lb)   SpO2 100%     Constitutional: Alert, well nourished, no distress noted  Eyes: PERRL; EOMI; normal conjunctiva, no swelling, no redness or photophobia  Ears: Ext canals - normal  Tympanic membranes - slightly dull bilat  Nose: External nose - normal;  Nares and mucosa - normal  Mouth/Throat: Mouth, tongue and teeth are normal; throat/uvula shows no redness; palate is intact; mucous membranes are moist  Neck/Thyroid: Neck is supple without adenopathy  Respiratory: Chest is normal to inspection; normal respiratory effort; lungs are clear to auscultation bilaterally   Cardiovascular: Rate and rhythm are regular with no murmur  Abdomen: Non-distended; soft, non-tender with no guarding or rebound; no organomegaly noted; no masses  Skin: No rashes    Results From Past 48 Hours:  No results found for this or any previous visit (from the past 48 hours).    ASSESSMENT/PLAN:   Diagnoses and all orders for this visit:    Serum sickness due to drug, subsequent encounter    Middle ear  [place]     Disorders of bursae and tendons in shoulder region, unspecified 2012     Enteritis      Hypertension 2021     Mild major depression (H24) 2019     Mixed hyperlipemias      PONV (postoperative nausea and vomiting)      Psoriasis      PAST SURGICAL HISTORY:   Past Surgical History:   Procedure Laterality Date     ABDOMEN SURGERY  2016    lysis of adhesions for bowel obstruction     C/SECTION, LOW TRANSVERSE      , Low Transverse     COLONOSCOPY      I cant remember the date. A few years ago.     COLONOSCOPY N/A 2023    Procedure: COLONOSCOPY, FLEXIBLE, WITH LESION REMOVAL USING SNARE;  Surgeon: Erika Delgado MD;  Location: [facility] OR     COLONOSCOPY WITH CO2 INSUFFLATION N/A 2023    Procedure: COLONOSCOPY, WITH CO2 INSUFFLATION;  Surgeon: Erika Delgado MD;  Location: [facility] OR     COMBINED ESOPHAGOSCOPY, GASTROSCOPY, DUODENOSCOPY (EGD) WITH CO2 INSUFFLATION N/A 3/11/2024    Procedure: Combined Esophagoscopy, Gastroscopy, Duodenoscopy (Egd) With Co2 Insufflation;  Surgeon: Sushma Burgos DO;  Location: [facility] OR     HYSTERECTOMY, VAGINAL  2006    has ovaries, due to bleeding     ORTHOPEDIC SURGERY  10/2015    Right shoulder     SHOULDER SURGERY Right 10/03/2014    SAD/DCE     SURGICAL HISTORY OF -   2006    rectocele and TVT     SURGICAL HISTORY OF -       left lumpectomy     MEDICATIONS:   Current Outpatient Medications:      Blood Pressure Monitoring (BLOOD PRESSURE MONITOR 3) MICHELLE, 1 Units daily, Disp: 1 each, Rfl: 0     clobetasol (TEMOVATE) 0.05 % external ointment, Apply  topically 2 times daily as needed. Apply sparingly to affected area.  Do not apply to face., Disp: 45 g, Rfl: 1     famotidine (PEPCID) 40 MG tablet, TAKE 1 TABLET(40 MG) BY MOUTH DAILY, Disp: 90 tablet, Rfl: 1     lisinopril (ZESTRIL) 40 MG tablet, Take 1 tablet (40 mg) by mouth daily, Disp: 90 tablet, Rfl: 3     omeprazole (PRILOSEC OTC) 20 MG EC tablet, Take 1 tablet (20 mg) by mouth  effusion, bilateral      PLAN:  Patient Instructions   Finish oral steroid    Avoid cefdinir (category = cephalosporins) in the future    Follow up with Dr De as planned    Consider seeing Dr Anjum Sorto for allergy [facility] - [phone]  Patient/parent's questions answered and states understanding of instructions  Call office if condition worsens or new symptoms, or if concerned  Reviewed return precautions    Orders Placed This Visit:  No orders of the defined types were placed in this encounter.      Jose Barton MD  12/27/2024       [1]   Current Outpatient Medications on File Prior to Visit   Medication Sig Dispense Refill    prednisoLONE 3 MG/ML Oral Solution Take 10 mL (30 mg total) by mouth daily for 5 days. 50 mL 0     No current facility-administered medications on file prior to visit.   [2]   Allergies  Allergen Reactions    Cefdinir HIVES     And serum sickness      daily, Disp: 30 tablet, Rfl: 0  ALLERGIES:    Allergies   Allergen Reactions     No Known Drug Allergy      SOCIAL HISTORY:   Social History     Socioeconomic History     Marital status:      Spouse name: Not on file     Number of children: Not on file     Years of education: Not on file     Highest education level: Not on file   Occupational History     Not on file   Tobacco Use     Smoking status: Never     Smokeless tobacco: Never   Vaping Use     Vaping Use: Never used   Substance and Sexual Activity     Alcohol use: Not Currently     Comment: Seldom; none since Dec 2023.     Drug use: No     Sexual activity: Yes     Partners: Male     Birth control/protection: Female Surgical     Comment: hysterectomy   Other Topics Concern     Parent/sibling w/ CABG, MI or angioplasty before 65F 55M? No   Social History Narrative     Not on file     Social Determinants of Health     Financial Resource Strain: Low Risk  (3/1/2024)    Financial Resource Strain      Within the past 12 months, have you or your family members you live with been unable to get utilities (heat, electricity) when it was really needed?: No   Food Insecurity: Low Risk  (3/1/2024)    Food Insecurity      Within the past 12 months, did you worry that your food would run out before you got money to buy more?: No      Within the past 12 months, did the food you bought just not last and you didn t have money to get more?: No   Transportation Needs: Low Risk  (3/1/2024)    Transportation Needs      Within the past 12 months, has lack of transportation kept you from medical appointments, getting your medicines, non-medical meetings or appointments, work, or from getting things that you need?: No   Physical Activity: Insufficiently Active (3/1/2024)    Exercise Vital Sign      Days of Exercise per Week: 2 days      Minutes of Exercise per Session: 20 min   Stress: No Stress Concern Present (3/1/2024)    [employer] [place] [employer] -  Occupational Stress Questionnaire      Feeling of Stress : Only a little   Social Connections: Unknown (3/1/2024)    Social Connection and Isolation Panel [NHANES]      Frequency of Communication with Friends and Family: Not on file      Frequency of Social Gatherings with Friends and Family: Patient declined      Attends Restorationism Services: Not on file      Active Member of Clubs or Organizations: Not on file      Attends Club or Organization Meetings: Not on file      Marital Status: Not on file   Interpersonal Safety: Low Risk  (3/4/2024)    Interpersonal Safety      Do you feel physically and emotionally safe where you currently live?: Yes      Within the past 12 months, have you been hit, slapped, kicked or otherwise physically hurt by someone?: No      Within the past 12 months, have you been humiliated or emotionally abused in other ways by your partner or ex-partner?: No   Housing Stability: Low Risk  (3/1/2024)    Housing Stability      Do you have housing? : Yes      Are you worried about losing your housing?: No     FAMILY HISTORY:   Family History   Problem Relation Age of Onset     Heart Disease Mother         defibrillator     Cardiovascular Mother         defibrillator     Diabetes Mother      Hyperlipidemia Mother      Cerebrovascular Disease Mother      Anesthesia Reaction Mother      Cardiovascular Father         valve replacement     Hypertension Father      Eye Disorder Father         maccular degeneration     Heart Disease Father         valve replacement     Lipids Father      Hyperlipidemia Father      Genitourinary Problems Brother         CKD     Diabetes Brother      Cancer Brother         leukemia     Other Cancer Brother         Lukemia     Breast Cancer Niece      Other Cancer Niece      Other Cancer Brother         Skin     Asthma Son      C.A.D. No family hx of      Cancer - colorectal No family hx of      Prostate Cancer No family hx of      Alzheimer Disease No family hx of       "Arthritis No family hx of      Blood Disease No family hx of      Circulatory No family hx of      Gastrointestinal Disease No family hx of      Musculoskeletal Disorder No family hx of      Neurologic Disorder No family hx of      Respiratory No family hx of      Thyroid Disease No family hx of      Alcohol/Drug No family hx of      Allergies No family hx of      Congenital Anomalies No family hx of      Connective Tissue Disorder No family hx of      Depression No family hx of      Anxiety Disorder No family hx of      Mental Illness No family hx of      Substance Abuse No family hx of      Osteoporosis No family hx of      Genetic Disorder No family hx of      Obesity No family hx of      EXAM:Vitals: BP (!) 144/83 (BP Location: Left arm, Patient Position: Sitting, Cuff Size: Adult Regular)   Pulse 80   Ht 1.588 m (5' 2.5\")   Wt 79.4 kg (175 lb)   LMP  (LMP Unknown)   BMI 31.50 kg/m    BMI= Body mass index is 31.5 kg/m .    General appearance: Patient is alert and fully cooperative with history & exam.  No sign of distress is noted during the visit.     Psychiatric: Affect is pleasant & appropriate.  Patient appears motivated to improve health.     Respiratory: Breathing is regular & unlabored while sitting.     HEENT: Hearing is intact to spoken word.  Speech is clear.  No gross evidence of visual impairment that would impact ambulation.       Vascular: DP and PT pulse 2/4 bilateral, CFT immediate, positive digital hair growth bilateral.  Temperature is warm to cool proximal to distal.  Minor varicosities noted bilateral.  No hemosiderin pigmentation.  Minimal if any edema.  No localized edema.     Neurologic: Normal plantar response bilateral.    Pt admits no burning and paraesthesias about the feet and toes with palpation.     Dermatologic: Toenails are in reasonable condition.  Reasonable texture turgor tone about the integument dorsally but there is multiple nucleated hyperkeratotic lesions on the " plantar lateral right foot.  Heavy hyperkeratosis noted on the right foot plantarly.     Musculoskeletal: Patient is ambulatory without assistive device or brace.  There is semi reducible contracture of the lesser digits.  Mildly limited range of motion through the talus navicular calcaneus navicular joint midfoot of the left foot and ankle.  Pain with any palpation through the sinus tarsi over the anterior process of the left calcaneus.  Subtle discomfort along the course of the peroneal tendons but no edema and no weakness is noted.  She is able to perform unilateral balance on the left but not toe raise on the left.  She is able to perform unilateral toe raise on the right.    Hemoglobin A1C (%)   Date Value   03/04/2024 6.3 (H)     Creatinine (mg/dL)   Date Value   03/04/2024 0.74   11/16/2022 0.71   06/02/2021 0.80   01/06/2021 0.71   03/18/2020 0.56   11/15/2019 0.69   03/02/2016 0.71   12/08/2011 0.75     ASSESSMENT:       ICD-10-CM    1. 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 Peroneal tendonitis of left lower extremity  M76.72 Orthotics, Mastectomy and Custom Compression Orders      3. Dermatitis aka ILVEN  L30.9            PLAN:    3/20/2024  Recommended fracture boot for 1 month  Begin order for orthotics to provide stability  Discussed injection oral anti-inflammatory a period of immobilization  Also discussed surgical reconstruction and excision of the tarsal coalition.  However she has had no symptoms prior to last fall summer and she would like to exhaust conservative options first.  We reviewed surgical excision of the tarsal coalition and postoperative cares and expectations and potential risk and complications as well.  Follow-up in 1 month  Activities as tolerated in the fracture boot        Carlos Camarillo DPM      Again, thank you for allowing me to participate in the care of your patient.         Sincerely,        Carlos Camarillo DPM

## 2025-01-20 ENCOUNTER — OFFICE VISIT (OUTPATIENT)
Dept: ORTHOPEDICS | Facility: CLINIC | Age: 60
End: 2025-01-20
Payer: COMMERCIAL

## 2025-01-20 DIAGNOSIS — S83.231D COMPLEX TEAR OF MEDIAL MENISCUS OF RIGHT KNEE, UNSPECIFIED WHETHER OLD OR CURRENT TEAR, SUBSEQUENT ENCOUNTER: Primary | ICD-10-CM

## 2025-01-20 PROCEDURE — 99214 OFFICE O/P EST MOD 30 MIN: CPT | Performed by: FAMILY MEDICINE

## 2025-01-20 ASSESSMENT — PAIN SCALES - GENERAL: PAINLEVEL_OUTOF10: MILD PAIN (3)

## 2025-01-20 NOTE — NURSING NOTE
Chief Complaint   Patient presents with    Right Knee - Pain, Follow Up     Right knee pain        There were no vitals filed for this visit.    There is no height or weight on file to calculate BMI.      PJ Chang NREMT                      
Unable to ambulate, although minimal ambulator at baseline. Elevated white count upon admission without clear etiology, orthopedics consulted for concerns of a septic joint.  Pt is s/p US guided aspiration of the L knee, found w/  MSSA bacteremia with c/f septic left knee. S/p I&D of L knee on 6/10. CT T&L Spine (6/18) No endplate erosive changes to suggest discitis or osteomyelitis. No evidence of abnormal enhancement within the spinal canal. Of note, MRI is a more sensitive evaluation for infection of the vertebral bodies, disc spaces and spinal canal, if there is clinical concern. Posterior disc osteophyte complexes at L2/L3 through L5/S1 result in bilateral foraminal stenoses at these levels. No high-grade central canal compromise is identified. XRAY Chest (6/18) Right arm PICC line tip overlies SVC - in good position. Heart is top normal in size. No focal consolidation. No pleural effusion or pneumothorax.

## 2025-01-20 NOTE — PROGRESS NOTES
HISTORY OF PRESENT ILLNESS  Ms. Jeffries is a pleasant 59 year old female following up with right knee pain.  Gabriella has a history of right knee pain with arthritic change.  She was most recently seen in August of this past year by Dr. Ojeda, he performed an injection at that visit that unfortunately did not help her very much at all.  She continues to feel pain at the knee, most so at the medial aspect, although this is throughout the entirety of her joint.  It is very difficult for her to extend her knee.     PHYSICAL EXAM  General  - normal appearance, in no obvious distress  Musculoskeletal - right knee  - stance: normal gait without limp  - inspection: 1+ effusion  - palpation: medial joint line tenderness  - ROM: 120 degrees flexion, lacks 10 degrees extension, pain at terminal extension passively  - strength: 5/5 in flexion, 5/5 in extension  - special tests:  (-) Lachman  (+) Mazin  (-) varus at 0 and 30 degrees flexion  (-) valgus at 0 and 30 degrees flexion  Neuro  - no sensory or motor deficit, grossly normal coordination, normal muscle tone      ASSESSMENT & PLAN  Ms. Jeffries is a 59 year old female following up with right knee pain.    I reviewed her previous x-ray in the room with her which does reveal mild medial compartment osteoarthritis.    Her pain is out of proportion to her MR findings, reason the possibility for intra-articular pathology aside from osteoarthritis.  I do suspect that she may have a meniscus tear.  I am ordering an MR, I will get in touch with her with results.    It was a pleasure seeing Gabriella.        Donato Dorsey, , CAQSM      This note was constructed using Dragon dictation software, please excuse any minor errors in spelling, grammar, or syntax.

## 2025-01-20 NOTE — LETTER
1/20/2025      Gabriella Jeffries  [address] [place] [address]      Dear Colleague,    Thank you for referring your patient, Gabriella Jeffries, to the [facility] [place]. Please see a copy of my visit note below.    HISTORY OF PRESENT ILLNESS  Ms. Jeffries is a pleasant 59 year old female following up with right knee pain.  Gabriella has a history of right knee pain with arthritic change.  She was most recently seen in August of this past year by Dr. Ojeda, he performed an injection at that visit that unfortunately did not help her very much at all.  She continues to feel pain at the knee, most so at the medial aspect, although this is throughout the entirety of her joint.  It is very difficult for her to extend her knee.     PHYSICAL EXAM  General  - normal appearance, in no obvious distress  Musculoskeletal - right knee  - stance: normal gait without limp  - inspection: 1+ effusion  - palpation: medial joint line tenderness  - ROM: 120 degrees flexion, lacks 10 degrees extension, pain at terminal extension passively  - strength: 5/5 in flexion, 5/5 in extension  - special tests:  (-) Lachman  (+) Mazin  (-) varus at 0 and 30 degrees flexion  (-) valgus at 0 and 30 degrees flexion  Neuro  - no sensory or motor deficit, grossly normal coordination, normal muscle tone      ASSESSMENT & PLAN  Ms. Jeffries is a 59 year old female following up with right knee pain.    I reviewed her previous x-ray in the room with her which does reveal mild medial compartment osteoarthritis.    Her pain is out of proportion to her MR findings, reason the possibility for intra-articular pathology aside from osteoarthritis.  I do suspect that she may have a meniscus tear.  I am ordering an MR, I will get in touch with her with results.    It was a pleasure seeing Gabriella.        Donato Dorsey, , CAQSM      This note was constructed using Dragon dictation software, please excuse any minor errors in spelling,  grammar, or syntax.      Again, thank you for allowing me to participate in the care of your patient.        Sincerely,    Donato Dorsey, DO    Electronically signed

## 2025-02-07 ENCOUNTER — ANCILLARY PROCEDURE (OUTPATIENT)
Dept: MRI IMAGING | Facility: CLINIC | Age: 60
End: 2025-02-07
Attending: FAMILY MEDICINE
Payer: COMMERCIAL

## 2025-02-07 DIAGNOSIS — S83.231D COMPLEX TEAR OF MEDIAL MENISCUS OF RIGHT KNEE, UNSPECIFIED WHETHER OLD OR CURRENT TEAR, SUBSEQUENT ENCOUNTER: ICD-10-CM

## 2025-02-07 PROCEDURE — 73721 MRI JNT OF LWR EXTRE W/O DYE: CPT | Mod: RT | Performed by: RADIOLOGY

## 2025-02-27 ENCOUNTER — OFFICE VISIT (OUTPATIENT)
Dept: ORTHOPEDICS | Facility: CLINIC | Age: 60
End: 2025-02-27
Payer: COMMERCIAL

## 2025-02-27 DIAGNOSIS — M84.375A STRESS FRACTURE OF METATARSAL BONE OF LEFT FOOT, INITIAL ENCOUNTER: ICD-10-CM

## 2025-02-27 DIAGNOSIS — S83.231D COMPLEX TEAR OF MEDIAL MENISCUS OF RIGHT KNEE, UNSPECIFIED WHETHER OLD OR CURRENT TEAR, SUBSEQUENT ENCOUNTER: Primary | ICD-10-CM

## 2025-02-27 RX ORDER — LIDOCAINE HYDROCHLORIDE 10 MG/ML
4 INJECTION, SOLUTION INFILTRATION; PERINEURAL
Status: COMPLETED | OUTPATIENT
Start: 2025-02-27 | End: 2025-02-27

## 2025-02-27 RX ORDER — TRIAMCINOLONE ACETONIDE 40 MG/ML
40 INJECTION, SUSPENSION INTRA-ARTICULAR; INTRAMUSCULAR
Status: COMPLETED | OUTPATIENT
Start: 2025-02-27 | End: 2025-02-27

## 2025-02-27 RX ADMIN — LIDOCAINE HYDROCHLORIDE 4 ML: 10 INJECTION, SOLUTION INFILTRATION; PERINEURAL at 09:00

## 2025-02-27 RX ADMIN — TRIAMCINOLONE ACETONIDE 40 MG: 40 INJECTION, SUSPENSION INTRA-ARTICULAR; INTRAMUSCULAR at 09:00

## 2025-02-27 ASSESSMENT — PAIN SCALES - GENERAL: PAINLEVEL_OUTOF10: MODERATE PAIN (5)

## 2025-02-27 NOTE — NURSING NOTE
Chief Complaint   Patient presents with    Right Knee - Pain, Follow Up     Right knee pain       There were no vitals filed for this visit.    There is no height or weight on file to calculate BMI.      PJ Chang NREMT

## 2025-02-27 NOTE — LETTER
2/27/2025      Gabriella Jeffries  [address] [place] [address]      Dear Colleague,    Thank you for referring your patient, Gabriella Jeffries, to the [facility] SP[facility]IN[facility] [place]. Please see a copy of my visit note below.    HISTORY OF PRESENT ILLNESS  Ms. Jeffries is a pleasant 59 year old female following up with right knee pain.  Gabriella is here to review her MRI.  Unfortunately her knee is still bothering her quite a bit and has not improved since her last visit.  Also, her left foot is quite painful.  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  She has never quite recovered from the surgery and has had persistent pain since.  She has pain that is present with rest as well, although this definitely gets worse with increased time on her feet.  She points to the outside of her foot at the base of the fifth metatarsal.     PHYSICAL EXAM  General  - normal appearance, in no obvious distress  Musculoskeletal - right knee  - stance: normal gait without limp  - inspection: 1+ effusion  - palpation: medial joint line tenderness  - ROM: 120 degrees flexion, lacks 10 degrees extension, pain at terminal extension passively  - strength: 5/5 in flexion, 5/5 in extension  - special tests:  (-) Lachman  (+) Mazin  (-) varus at 0 and 30 degrees flexion  (-) valgus at 0 and 30 degrees flexion    Musculoskeletal - left foot  - stance: normal gait without limp  - inspection: no swelling or effusion,  normal bone and joint alignment, no obvious deformity  - palpation: Markedly tender base of fifth metatarsal, positive calcaneal squeeze  - ROM: normal active and passive ROM of great and lesser toes, no pain with MT translation  - strength: 5/5 in all planes  Neuro  - no sensory or motor deficit, grossly normal coordination, normal muscle tone      ASSESSMENT & PLAN  Ms. Jeffries is a 59 year old female following up with right knee pain.  She also has chronic,  postsurgical left foot pain.    I reviewed her MR in the room with her:  1. Complex predominantly horizontal oblique tearing of the medial  meniscus; new from prior MRI 6/6/2022.  2. 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 Grade IV chondromalacia patellofemoral compartment with  decreased reactive bony change in the subchondral bone since prior  MRI.  3. Moderate joint effusion.    Through shared decision making we did decide to inject her knee today.  We also discussed that if this injection does not help her we could consider a surgical consult or possibly a hyaluronic acid injection.  She is going to keep me in the loop as to her recovery, if she is not improving she is going to let me know.    With regards to her foot I reviewed her previous x-ray in the room with her.  This is from March 2024 and does show a mild irregularity at the cortical bone at the lateral aspect of the base of the fifth metatarsal.  Her clinical picture does suggest that she may have a stress injury at either the fifth metatarsal base, calcaneus, or both.  Given her level of pain and lack of ultimate improvement after surgery I am ordering an MRI.  I will get in touch with her with the results.    It was a pleasure seeing Gabriella.    Greater than 30 minutes were spent on the day of visit reviewing records, in direct face-to-face consultation and exam, and documentation independent of the procedure.       Donato Dorsey DO, CAM      This note was constructed using Dragon dictation software, please excuse any minor errors in spelling, grammar, or syntax.    Large Joint Injection/Arthocentesis: R knee joint    Date/Time: 2/27/2025 9:00 AM    Performed by: Donato Dorsey DO  Authorized by: Donato Dorsey DO    Indications:  Pain  Needle Size:  22 G  Guidance: landmark guided    Approach:  Lateral  Location:  Knee      Medications:  40 mg triamcinolone 40 MG/ML; 4 mL lidocaine 1 %  Outcome:  Tolerated  well, no immediate complications  Procedure discussed: discussed risks, benefits, and alternatives    Consent Given by:  Patient  Timeout: timeout called immediately prior to procedure    Prep: patient was prepped and draped in usual sterile fashion       PROCEDURE    Knee Injection - Intraarticular  The patient was informed of the risks and the benefits of the procedure and a written consent was signed.  The patient's right knee was prepped with chlorhexidine in sterile fashion.   40 mg of triamcinolone suspension was drawn up into a 5 mL syringe with 4 mL of 1% lidocaine.  Injection was performed using substerile technique.  A 1.5-inch 22-gauge needle was used to enter the lateral aspect of the knee.  Injection performed successfully without difficulty.  There were no complications. The patient tolerated the procedure well. There was negligible bleeding.                   Again, thank you for allowing me to participate in the care of your patient.        Sincerely,        Donato Dorsey DO    Electronically signed

## 2025-02-27 NOTE — NURSING NOTE
[facility]   ORTHOPEDICS & SPORTS MEDICINE  [address] [place] [address]  Dept: [phone]  ______________________________________________________________________________    Patient: Gabriella Jeffries   : 1965   MRN: [id number]   2025    INVASIVE PROCEDURE SAFETY CHECKLIST    Date: 2025   Procedure: right knee injection  Patient Name: Gabriella Jeffries  MRN: [id number]  YOB: 1965    Action: Complete sections as appropriate. Any discrepancy results in a HARD COPY until resolved.     PRE PROCEDURE:  Patient ID verified with 2 identifiers (name and  or MRN): Yes  Procedure and site verified with patient/designee (when able): Yes  Accurate consent documentation in medical record: Yes  H&P (or appropriate assessment) documented in medical record: Yes  H&P must be up to 20 days prior to procedure and updates within 24 hours of procedure as applicable: NA  Relevant diagnostic and radiology test results appropriately labeled and displayed as applicable: NA  Procedure site(s) marked with provider initials: NA    TIMEOUT:  Time-Out performed immediately prior to starting procedure, including verbal and active participation of all team members addressing the following:Yes  * Correct patient identify  * Confirmed that the correct side and site are marked  * An accurate procedure consent form  * Agreement on the procedure to be done  * Correct patient position  * Relevant images and results are properly labeled and appropriately displayed  * The need to administer antibiotics or fluids for irrigation purposes during the procedure as applicable   * Safety precautions based on patient history or medication use    DURING PROCEDURE: Verification of correct person, site, and procedures any time the responsibility for care of the patient is transferred to another member of the care team.       Prior to injection, verified patient identity using patient's name and date of  birth.  Due to injection administration, patient instructed to remain in clinic for 15 minutes  afterwards, and to report any adverse reaction to me immediately.    Joint injection was performed.      Drug Amount Wasted:  None.  Vial/Syringe: Single dose vial  Expiration Date:  9/30/2026      Bernardo Hernandez, EMT  February 27, 2025

## 2025-02-27 NOTE — PROGRESS NOTES
HISTORY OF PRESENT ILLNESS  Ms. Jeffries is a pleasant 59 year old female following up with right knee pain.  Gabriella is here to review her MRI.  Unfortunately her knee is still bothering her quite a bit and has not improved since her last visit.  Also, her left foot is quite painful.  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  She has never quite recovered from the surgery and has had persistent pain since.  She has pain that is present with rest as well, although this definitely gets worse with increased time on her feet.  She points to the outside of her foot at the base of the fifth metatarsal.     PHYSICAL EXAM  General  - normal appearance, in no obvious distress  Musculoskeletal - right knee  - stance: normal gait without limp  - inspection: 1+ effusion  - palpation: medial joint line tenderness  - ROM: 120 degrees flexion, lacks 10 degrees extension, pain at terminal extension passively  - strength: 5/5 in flexion, 5/5 in extension  - special tests:  (-) Lachman  (+) Mazin  (-) varus at 0 and 30 degrees flexion  (-) valgus at 0 and 30 degrees flexion    Musculoskeletal - left foot  - stance: normal gait without limp  - inspection: no swelling or effusion,  normal bone and joint alignment, no obvious deformity  - palpation: Markedly tender base of fifth metatarsal, positive calcaneal squeeze  - ROM: normal active and passive ROM of great and lesser toes, no pain with MT translation  - strength: 5/5 in all planes  Neuro  - no sensory or motor deficit, grossly normal coordination, normal muscle tone      ASSESSMENT & PLAN  Ms. Jeffries is a 59 year old female following up with right knee pain.  She also has chronic, postsurgical left foot pain.    I reviewed her MR in the room with her:  1. Complex predominantly horizontal oblique tearing of the medial  meniscus; new from prior MRI 6/6/2022.  2. 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 Grade IV chondromalacia patellofemoral compartment with  decreased reactive bony change in the subchondral bone since prior  MRI.  3. Moderate joint effusion.    Through shared decision making we did decide to inject her knee today.  We also discussed that if this injection does not help her we could consider a surgical consult or possibly a hyaluronic acid injection.  She is going to keep me in the loop as to her recovery, if she is not improving she is going to let me know.    With regards to her foot I reviewed her previous x-ray in the room with her.  This is from March 2024 and does show a mild irregularity at the cortical bone at the lateral aspect of the base of the fifth metatarsal.  Her clinical picture does suggest that she may have a stress injury at either the fifth metatarsal base, calcaneus, or both.  Given her level of pain and lack of ultimate improvement after surgery I am ordering an MRI.  I will get in touch with her with the results.    It was a pleasure seeing Gabriella.    Greater than 30 minutes were spent on the day of visit reviewing records, in direct face-to-face consultation and exam, and documentation independent of the procedure.       Donato Dorsey DO, CAQSM      This note was constructed using Dragon dictation software, please excuse any minor errors in spelling, grammar, or syntax.    Large Joint Injection/Arthocentesis: R knee joint    Date/Time: 2/27/2025 9:00 AM    Performed by: Donato Dorsey DO  Authorized by: Donato Dorsey DO    Indications:  Pain  Needle Size:  22 G  Guidance: landmark guided    Approach:  Lateral  Location:  Knee      Medications:  40 mg triamcinolone 40 MG/ML; 4 mL lidocaine 1 %  Outcome:  Tolerated well, no immediate complications  Procedure discussed: discussed risks, benefits, and alternatives    Consent Given by:  Patient  Timeout: timeout called immediately prior to procedure    Prep: patient was prepped and draped in usual sterile  fashion       PROCEDURE    Knee Injection - Intraarticular  The patient was informed of the risks and the benefits of the procedure and a written consent was signed.  The patient's right knee was prepped with chlorhexidine in sterile fashion.   40 mg of triamcinolone suspension was drawn up into a 5 mL syringe with 4 mL of 1% lidocaine.  Injection was performed using substerile technique.  A 1.5-inch 22-gauge needle was used to enter the lateral aspect of the knee.  Injection performed successfully without difficulty.  There were no complications. The patient tolerated the procedure well. There was negligible bleeding.

## 2025-03-07 ENCOUNTER — HOSPITAL ENCOUNTER (OUTPATIENT)
Dept: MRI IMAGING | Facility: CLINIC | Age: 60
Discharge: HOME | End: 2025-03-07
Attending: FAMILY MEDICINE | Admitting: FAMILY MEDICINE
Payer: COMMERCIAL

## 2025-03-07 DIAGNOSIS — M84.375A STRESS FRACTURE OF METATARSAL BONE OF LEFT FOOT, INITIAL ENCOUNTER: ICD-10-CM

## 2025-03-07 PROCEDURE — 73718 MRI LOWER EXTREMITY W/O DYE: CPT | Mod: 26 | Performed by: RADIOLOGY

## 2025-03-07 PROCEDURE — 73718 MRI LOWER EXTREMITY W/O DYE: CPT | Mod: LT

## 2025-04-03 ENCOUNTER — OFFICE VISIT (OUTPATIENT)
Dept: ORTHOPEDICS | Facility: CLINIC | Age: 60
End: 2025-04-03
Payer: COMMERCIAL

## 2025-04-03 DIAGNOSIS — M84.375A STRESS FRACTURE OF NAVICULAR BONE OF LEFT FOOT: Primary | ICD-10-CM

## 2025-04-03 DIAGNOSIS — S83.231D COMPLEX TEAR OF MEDIAL MENISCUS OF RIGHT KNEE, UNSPECIFIED WHETHER OLD OR CURRENT TEAR, SUBSEQUENT ENCOUNTER: ICD-10-CM

## 2025-04-03 DIAGNOSIS — S83.231D COMPLEX TEAR OF MEDIAL MENISCUS OF RIGHT KNEE, UNSPECIFIED WHETHER OLD OR CURRENT TEAR, SUBSEQUENT ENCOUNTER: Primary | ICD-10-CM

## 2025-04-03 ASSESSMENT — PAIN SCALES - GENERAL: PAINLEVEL_OUTOF10: MODERATE PAIN (4)

## 2025-04-03 NOTE — PROGRESS NOTES
HISTORY OF PRESENT ILLNESS  Ms. Jeffries is a pleasant 59 year old female following up with left foot pain.  Doris is here to review her MRI.  She has been wearing her boot for just over 2 weeks, she does feel that her symptoms are improving.  She has pain that is present as the day goes on that gets worse towards the end of the day, although again improving.  She also has ongoing right knee pain.  The injection that we had performed helped her only for a couple of days.  She continues to experience fairly severe pain in her knee that is worse with walking.  She has also started to experience buckling and mechanical symptoms in her knee that have nearly caused her to fall on occasion.     PHYSICAL EXAM  General  - normal appearance, in no obvious distress  Musculoskeletal - right knee  - stance: normal gait without limp  - inspection: trace effusion  - palpation: medial joint line tenderness  - ROM: 135 degrees flexion, -5 degrees extension,  Neuro  - no sensory or motor deficit, grossly normal coordination, normal muscle tone      ASSESSMENT & PLAN  Ms. Jeffries is a 59 year old female following up with left foot pain.    I reviewed her MRI in the room with her, this reads:  1. 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   2. No evidence of stress fracture of the fifth metatarsal.  3. Mild tendinopathy of the abductor digiti minimi and peroneus brevis  tendons at the base of the fifth metatarsal.     I am happy that she is trending in the right direction.  She should continue to wear her boot, we should follow-up via telephone in 2 weeks.  If she is doing great at that visit and has no pain we could discuss transitioning out of the boot, although if her pain is not resolving I would likely recommend that we stay in the boot for another couple of weeks.  I am referring her for custom  orthotics as well to hopefully transition out of once she is able to get out of her boot.    With regards to her knee she does have evidence of a meniscus tear along with mechanical symptoms, although also has grade IV chondromalacia.  Given her mechanical symptoms and her meniscus tear I do think it would be reasonable to explore surgical options given her failure of conservative therapy thus far.  I am referring her to have this discussion.    It was a pleasure seeing Gabriella.        Donato Dorsey DO, CAQSM      This note was constructed using Dragon dictation software, please excuse any minor errors in spelling, grammar, or syntax.

## 2025-04-03 NOTE — NURSING NOTE
Chief Complaint   Patient presents with    Left Foot - Pain, Follow Up     Left foot pain follow up       There were no vitals filed for this visit.    There is no height or weight on file to calculate BMI.      PJ Chang NREMT

## 2025-04-09 NOTE — TELEPHONE ENCOUNTER
REASON FOR VISIT: COMPLEX TEAR OF MEDIAL MENISCUS OFRr KNEE    DATE OF APPT: 4/17/2025   NOTES (FOR ALL VISITS) STATUS DETAILS   [facility]  Donato Dorsey DO 4/03/2025   OFFICE NOTE from other specialist Internal [facility]  Albaro Ojeda MD 8/10/2024   EMG N/A    MEDICATION LIST N/A    IMAGING  (FOR ALL VISITS)     XR Internal [facility]  XR Knee right 8/10/2024   MRI (HEAD, NECK, SPINE) Internal [facility]  MR Knee right 2/07/2025   CT (HEAD, NECK, SPINE) N/A

## 2025-04-15 ENCOUNTER — VIRTUAL VISIT (OUTPATIENT)
Dept: ORTHOPEDICS | Facility: CLINIC | Age: 60
End: 2025-04-15
Payer: COMMERCIAL

## 2025-04-15 DIAGNOSIS — M84.375A STRESS FRACTURE OF NAVICULAR BONE OF LEFT FOOT: Primary | ICD-10-CM

## 2025-04-15 PROCEDURE — 98013 SYNCH AUDIO-ONLY EST LOW 20: CPT | Performed by: FAMILY MEDICINE

## 2025-04-15 NOTE — LETTER
4/15/2025      Gabriella Jeffries  [address] [place] [address]      Dear Colleague,    Thank you for referring your patient, Gabriella Jeffries, to the [facility]TS [facility]ICINE CLIN[facility] [place]. Please see a copy of my visit note below.    Gabriella is a 59 year old who is being evaluated via a billable telephone visit.      Originating Location (pt. Location): Home    Distant Location (provider location):  On-site  Telephone visit completed due to inaccessibility of video    Assessment & Plan    Stress fracture of navicular bone of left foot  Doris is doing quite a bit better overall, although her symptoms are still present.  I do think that it would be best to continue to wear her boot for the next 2 weeks, at that point we should have a follow-up visit to discuss transitioning out of the boot if doing well.  In the meantime she should wear the boot for weightbearing but can continue her physical therapy exercises while nonweightbearing, out of the boot.    She does have an appointment for her orthotics in a couple of weeks as well.    Segundo Dorsey DO CAQSM    Subjective  Gabriella is a 59 year old          Objective          Vitals:  No vitals were obtained today due to virtual visit.    Physical Exam   General: Alert and no distress //Respiratory: No audible wheeze, cough, or shortness of breath // Psychiatric:  Appropriate affect, tone, and pace of words            Phone call duration, including charting and review: 14 minutes  Signed Electronically by: Donato Dorsey DO        Again, thank you for allowing me to participate in the care of your patient.        Sincerely,        Donato Dorsye DO    Electronically signed

## 2025-04-15 NOTE — LETTER
4/15/2025      Gabriella Jeffries  [address] [place] [address]      Dear Colleague,    Thank you for referring your patient, Gabriella Jeffries, to the [facility] SPORTS MEDICINE [facility] [place]. Please see a copy of my visit note below.    Gabriella is a 59 year old who is being evaluated via a billable telephone visit.      Originating Location (pt. Location): Home    Distant Location (provider location):  On-site  Telephone visit completed due to inaccessibility of video    Assessment & Plan    Stress fracture of navicular bone of left foot  Doris is doing quite a bit better overall, although her symptoms are still present.  I do think that it would be best to continue to wear her boot for the next 2 weeks, at that point we should have a follow-up visit to discuss transitioning out of the boot if doing well.  In the meantime she should wear the boot for weightbearing but can continue her physical therapy exercises while nonweightbearing, out of the boot.    She does have an appointment for her orthotics in a couple of weeks as well.    Segundo Dorsey DO CAQSM    Subjective  Gabriella is a 59 year old          Objective          Vitals:  No vitals were obtained today due to virtual visit.    Physical Exam   General: Alert and no distress //Respiratory: No audible wheeze, cough, or shortness of breath // Psychiatric:  Appropriate affect, tone, and pace of words            Phone call duration, including charting and review: 14 minutes  Signed Electronically by: Donato Dorsey DO        Again, thank you for allowing me to participate in the care of your patient.        Sincerely,        Donato Dorsey DO    Electronically signed

## 2025-04-15 NOTE — PROGRESS NOTES
Gabriella is a 59 year old who is being evaluated via a billable telephone visit.      Originating Location (pt. Location): Home    Distant Location (provider location):  On-site  Telephone visit completed due to inaccessibility of video    Assessment & Plan     Stress fracture of navicular bone of left foot  Doris is doing quite a bit better overall, although her symptoms are still present.  I do think that it would be best to continue to wear her boot for the next 2 weeks, at that point we should have a follow-up visit to discuss transitioning out of the boot if doing well.  In the meantime she should wear the boot for weightbearing but can continue her physical therapy exercises while nonweightbearing, out of the boot.    She does have an appointment for her orthotics in a couple of weeks as well.    Segundo Dorsey DO CAQSM    Subjective   Gabriella is a 59 year old          Objective           Vitals:  No vitals were obtained today due to virtual visit.    Physical Exam   General: Alert and no distress //Respiratory: No audible wheeze, cough, or shortness of breath // Psychiatric:  Appropriate affect, tone, and pace of words            Phone call duration, including charting and review: 14 minutes  Signed Electronically by: Donato Dorsey DO

## 2025-04-16 DIAGNOSIS — I10 BENIGN ESSENTIAL HYPERTENSION: ICD-10-CM

## 2025-04-16 RX ORDER — LISINOPRIL 40 MG/1
40 TABLET ORAL DAILY
Qty: 90 TABLET | Refills: 0 | Status: SHIPPED | OUTPATIENT
Start: 2025-04-16

## 2025-04-17 ENCOUNTER — PRE VISIT (OUTPATIENT)
Dept: ORTHOPEDICS | Facility: CLINIC | Age: 60
End: 2025-04-17

## 2025-04-17 ENCOUNTER — ANCILLARY PROCEDURE (OUTPATIENT)
Dept: GENERAL RADIOLOGY | Facility: CLINIC | Age: 60
End: 2025-04-17
Attending: ORTHOPAEDIC SURGERY
Payer: COMMERCIAL

## 2025-04-17 DIAGNOSIS — S83.231D COMPLEX TEAR OF MEDIAL MENISCUS OF RIGHT KNEE, UNSPECIFIED WHETHER OLD OR CURRENT TEAR, SUBSEQUENT ENCOUNTER: ICD-10-CM

## 2025-04-17 PROCEDURE — 73564 X-RAY EXAM KNEE 4 OR MORE: CPT | Mod: RT | Performed by: RADIOLOGY

## 2025-04-17 NOTE — TELEPHONE ENCOUNTER
Staff sent [email or web address] message to notify patient of required appointment for further medication refills.

## 2025-04-22 NOTE — CONFIDENTIAL NOTE
DIAGNOSIS:  Complex tear of medial meniscus of right knee, unspecified whether old or current tear, subsequent encounter    APPOINTMENT DATE: 05.12.2025   NOTES STATUS DETAILS   OFFICE NOTE from referring provider Internal 04.17.2025 Bernardo Abdul MD    MEDICATION LIST Internal    LABS     CBC/DIFF Internal 03.04.2025    INJECTIONS DONE IN RADIOLOGY Internal 02.27.2025, 08.10.2024    MRI Internal 02.07.2025, 06.06.2022 MR Knee Right   XRAYS (IMAGES & REPORTS) Internal 04.17.2025, 08.10.2024, 05.12.2022 XR Knee Right

## 2025-05-06 ENCOUNTER — VIRTUAL VISIT (OUTPATIENT)
Dept: ORTHOPEDICS | Facility: CLINIC | Age: 60
End: 2025-05-06
Attending: FAMILY MEDICINE
Payer: COMMERCIAL

## 2025-05-06 DIAGNOSIS — M84.375A STRESS FRACTURE OF NAVICULAR BONE OF LEFT FOOT: Primary | ICD-10-CM

## 2025-05-06 PROCEDURE — 98013 SYNCH AUDIO-ONLY EST LOW 20: CPT | Performed by: FAMILY MEDICINE

## 2025-05-06 NOTE — LETTER
5/6/2025      Gabriella Jeffries  [address] [place] [address]      Dear Colleague,    Thank you for referring your patient, Gabriella Jeffries, to the [facility]IC[facility] [place]. Please see a copy of my visit note below.    Gabriella is a 59 year old who is being evaluated via a billable telephone visit.      Originating Location (pt. Location): Home    Distant Location (provider location):  On-site  Telephone visit completed due to inaccessibility of video    Assessment & Plan    Stress fracture of navicular bone of left foot  I am happy that Doris is improving, although her pain is persistent at the outside of her foot.  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  She does have an appointment with Dr. Burt on Monday to discuss this and her knee, she is going to discuss this with him as well.    It was a pleasure talking to Doris today.    Segundo Dorsey DO CAQSM      Subjective  Gabriella is a 59 year old woman presenting via telephone today to revisit her foot pain.  She has been weaning out of her boot since our last visit on April 15, things have been going okay by enlarge, although she continues to have pain at the outside of her foot.  She feels about 85% better overall, although the persistent pain in this area is quite bothersome and is painful with each step.          Objective          Vitals:  No vitals were obtained today due to virtual visit.    Physical Exam   General: Alert and no distress //Respiratory: No audible wheeze, cough, or shortness of breath // Psychiatric:  Appropriate affect, tone, and pace of words            Phone call duration: 14 minutes, including charting  Signed Electronically by: Donato Dorsey DO        Again, thank you for allowing me to participate in the care of your patient.        Sincerely,        Donato Dorsey DO    Electronically signed

## 2025-05-06 NOTE — PROGRESS NOTES
Gabriella is a 59 year old who is being evaluated via a billable telephone visit.      Originating Location (pt. Location): Home    Distant Location (provider location):  On-site  Telephone visit completed due to inaccessibility of video    Assessment & Plan     Stress fracture of navicular bone of left foot  I am happy that Doris is improving, although her pain is persistent at the outside of her foot.  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  She does have an appointment with Dr. Burt on Monday to discuss this and her knee, she is going to discuss this with him as well.    It was a pleasure talking to Doris today.    Segundo Dorsey DO CAQSM      Subjective   Gabriella is a 59 year old woman presenting via telephone today to revisit her foot pain.  She has been weaning out of her boot since our last visit on April 15, things have been going okay by enlarge, although she continues to have pain at the outside of her foot.  She feels about 85% better overall, although the persistent pain in this area is quite bothersome and is painful with each step.          Objective           Vitals:  No vitals were obtained today due to virtual visit.    Physical Exam   General: Alert and no distress //Respiratory: No audible wheeze, cough, or shortness of breath // Psychiatric:  Appropriate affect, tone, and pace of words            Phone call duration: 14 minutes, including charting  Signed Electronically by: Donato Dorsey DO

## 2025-05-06 NOTE — LETTER
5/6/2025      Gabriella Jeffries  [address] [place] [address]      Dear Colleague,    Thank you for referring your patient, Gabriella Jeffries, to the [facility] [place]. Please see a copy of my visit note below.    Gabriella is a 59 year old who is being evaluated via a billable telephone visit.      Originating Location (pt. Location): Home    Distant Location (provider location):  On-site  Telephone visit completed due to inaccessibility of video    Assessment & Plan    Stress fracture of navicular bone of left foot  I am happy that Doris is improving, although her pain is persistent at the outside of her foot.  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  She does have an appointment with Dr. Burt on Monday to discuss this and her knee, she is going to discuss this with him as well.    It was a pleasure talking to Doris today.    Segundo Dorsey DO CAQSM      Subjective  Gabriella is a 59 year old woman presenting via telephone today to revisit her foot pain.  She has been weaning out of her boot since our last visit on April 15, things have been going okay by enlarge, although she continues to have pain at the outside of her foot.  She feels about 85% better overall, although the persistent pain in this area is quite bothersome and is painful with each step.          Objective          Vitals:  No vitals were obtained today due to virtual visit.    Physical Exam   General: Alert and no distress //Respiratory: No audible wheeze, cough, or shortness of breath // Psychiatric:  Appropriate affect, tone, and pace of words            Phone call duration: 14 minutes, including charting  Signed Electronically by: Donato Dorsey DO        Again, thank you for allowing me to participate in the care of your patient.        Sincerely,        Donato Dorsey DO    Electronically signed

## 2025-05-09 ASSESSMENT — KOOS JR
STRAIGHTENING KNEE FULLY: SEVERE
HOW SEVERE IS YOUR KNEE STIFFNESS AFTER FIRST WAKING IN MORNING: SEVERE
KOOS JR SCORING: 34.17
TWISING OR PIVOTING ON KNEE: SEVERE
RISING FROM SITTING: MODERATE
BENDING TO THE FLOOR TO PICK UP OBJECT: EXTREME
STANDING UPRIGHT: MODERATE
GOING UP OR DOWN STAIRS: EXTREME

## 2025-05-12 ENCOUNTER — PRE VISIT (OUTPATIENT)
Dept: ORTHOPEDICS | Facility: CLINIC | Age: 60
End: 2025-05-12

## 2025-05-12 ENCOUNTER — OFFICE VISIT (OUTPATIENT)
Dept: ORTHOPEDICS | Facility: CLINIC | Age: 60
End: 2025-05-12
Attending: ORTHOPAEDIC SURGERY
Payer: COMMERCIAL

## 2025-05-12 ENCOUNTER — ANCILLARY PROCEDURE (OUTPATIENT)
Dept: GENERAL RADIOLOGY | Facility: CLINIC | Age: 60
End: 2025-05-12
Attending: ORTHOPAEDIC SURGERY
Payer: COMMERCIAL

## 2025-05-12 VITALS — HEIGHT: 63 IN | BODY MASS INDEX: 30.83 KG/M2 | WEIGHT: 174 LBS

## 2025-05-12 DIAGNOSIS — M79.672 LEFT FOOT PAIN: ICD-10-CM

## 2025-05-12 DIAGNOSIS — S83.231D COMPLEX TEAR OF MEDIAL MENISCUS OF RIGHT KNEE, UNSPECIFIED WHETHER OLD OR CURRENT TEAR, SUBSEQUENT ENCOUNTER: ICD-10-CM

## 2025-05-12 PROCEDURE — 73630 X-RAY EXAM OF FOOT: CPT | Mod: LT | Performed by: RADIOLOGY

## 2025-05-12 RX ORDER — LIDOCAINE 50 MG/G
1 PATCH TOPICAL EVERY 24 HOURS
Qty: 30 PATCH | Refills: 0 | Status: SHIPPED | OUTPATIENT
Start: 2025-05-12

## 2025-05-12 NOTE — PROGRESS NOTES
[facility]thopaedic Surgery Clinic - New Patient    Chief Complaint:  Right knee and left foot pain.    History:  I had the opportunity to meet this pleasant 59-year-old patient today, as a new patient to me but an established patient within this group, at the request of Dr. Bernardo Abdul for evaluation and management of right knee and left foot pain.  She reports a longstanding history of right knee pain without any history of directly associated or antecedent trauma.  When asked about the location she indicates various areas with her hand around the medial joint line and patellofemoral joint.  She reports both giving way episodes as well a brief periods of locking lasting less than a minute.  She has had a number of treatments over the years including  corticosteroid injection on 08/10/2024 by Dr. Ojeda which did not appear to have helped.  She subsequently saw Dr. Dorsey several times.  She underwent another right knee CSI on 02/27/2025 by Dr. Dorsey which appears to have helped for a few days.  A right knee MRI was performed 02/07/2025.  She subsequently saw Dr. Abdul 04/17/2025, and it appeared that there was too much arthritis present for arthroscopy or meniscal surgery to be of likely benefit, and a referral was placed to me for consideration of joint replacement.  She reports she can walk for about 30 minutes at a time.  Besides the CSI's she's also tried PT and bracing.    With regard to the left foot, the patient has a reported history of left foot pain for which she underwent an apparent excision of a tarsal coalition and bone grafting of a cyst on or around 04/30/2024.  The patient reports this did not help and that she has had ongoing pain ever since then.  She reports that this pain is about the same kind of pain she had preoperatively.  She denies any known complications such as infection or DVT postoperatively.  She reports feeling like she is walking on the outside of her left foot.  She points to the  outside border of the left foot as well as the sinus tarsi as the locations of her left foot pain.  She has been evaluated by Dr. Dorsey for the left foot as well.  She wore a boot for the left lower extremity.  She also had a left foot MRI 2025 which she reviewed with Dr. Dorsey.  Bone edema in the navicular was noted.  Custom foot orthoses were ordered.    Social considerations:  Occupation DME   Work status Full time   Living situation House with 2 floors   Friends/Family nearby Yes  Reliable transportation to and from appointments Yes  Hobbies/recreational activities hiking and reading   Tobacco/Nicotine use No  Alcohol use No  Illicit drug use No    Past Medical History:  Past Medical History:   Diagnosis Date    Bowel obstruction (H) 2016    [place]    Disorders of bursae and tendons in shoulder region, unspecified 2012    Enteritis     Hypertension 2021    Mild major depression 2019    Mixed hyperlipemias     PONV (postoperative nausea and vomiting)     Psoriasis      Past Surgical History:  Past Surgical History:   Procedure Laterality Date    ABDOMEN SURGERY  2016    lysis of adhesions for bowel obstruction    C/SECTION, LOW TRANSVERSE      , Low Transverse    COLONOSCOPY      I cant remember the date. A few years ago.    COLONOSCOPY N/A 2023    Procedure: COLONOSCOPY, FLEXIBLE, WITH LESION REMOVAL USING SNARE;  Surgeon: Erika Delgado MD;  Location: [facility]    COLONOSCOPY WITH CO2 INSUFFLATION N/A 2023    Procedure: COLONOSCOPY, WITH CO2 INSUFFLATION;  Surgeon: Erika Delgado MD;  Location: [facility]    COMBINED ESOPHAGOSCOPY, GASTROSCOPY, DUODENOSCOPY (EGD) WITH CO2 INSUFFLATION N/A 3/11/2024    Procedure: Combined Esophagoscopy, Gastroscopy, Duodenoscopy (Egd) With Co2 Insufflation;  Surgeon: Sushma Burgos DO;  Location: [facility] OR    HYSTERECTOMY, VAGINAL  2006    has ovaries, due to bleeding    ORTHOPEDIC SURGERY  10/2015    Right shoulder     "SHOULDER SURGERY Right 10/03/2014    SAD/DCE    SURGICAL HISTORY OF -   2006    rectocele and TVT    SURGICAL HISTORY OF -       left lumpectomy     Allergies:     Allergies   Allergen Reactions    No Known Drug Allergy      Medications:  Current Outpatient Medications   Medication Sig Dispense Refill    lidocaine (LIDODERM) 5 % patch Place 1 patch over 12 hours onto the skin every 24 hours. 30 patch 0    Blood Pressure Monitoring (BLOOD PRESSURE MONITOR 3) MICHELLE 1 Units daily 1 each 0    clobetasol (TEMOVATE) 0.05 % external ointment Apply  topically 2 times daily as needed. Apply sparingly to affected area.  Do not apply to face. 45 g 1    lisinopril (ZESTRIL) 40 MG tablet TAKE 1 TABLET(40 MG) BY MOUTH DAILY 90 tablet 0     No current facility-administered medications for this visit.     Objective:  Ht 1.6 m (5' 3\")   Wt 78.9 kg (174 lb)   LMP  (LMP Unknown)   BMI 30.82 kg/m    Exam:  Right knee:  R knee 0-120  Stable ligamentous exam though there is some guarding  Distally, there are 3/4 easily palpable right DP and PT pulses, light touch sensation is endorsed as intact in all dermatomes of the right foot, and motor strength is 5/5 for right tibialis anterior, EHL, EDL, gastroc-soleus complex, FHL, FDL, PTT, and peroneals.  Tender diff lateral/post, medial joint line, patellofemoral joint.  2 quadrants medial, 1 quadrant lateral mobility, with crepitance.  Demonstrates ability to perform a right lower extremity straight leg raise with no extensor lag.  Normal patellar tracking.  Distally, there are 3/4 easily palpable right DP and PT pulses, light touch sensation is endorsed as intact in all dermatomes of the right foot, and motor strength is 5/5 for right tibialis anterior, EHL, EDL, gastroc-soleus complex, FHL, FDL, PTT, and peroneals.  Left foot:  Standing exam shows left hindfoot valgus and pes planus.  Heels turn appropriately into varus with a double leg heel raise.  Metatarsus adductus present.  Intact " skin without open wounds or blisters.  Tender diffusely posterolateral ankle, sinus tarsi, lateral border of foot.  No palpable defects or demonstrable subluxation in the peroneal tendons.  Nontender medial malleolus, lateral malleolus, syndesmosis, anterior ankle joint line, navicular, talonavicular joint, Lisfranc joint, medial flexor tendons, entire forefoot except 5th MTP joint.  Well healed scar in the sinus tarsi and a well healed longitudinal retrofibular scar, both dry without erythema or drainage.  5/5 motor strength for left TA, EHL, EHL, GS, FHL, FDL, peroneals, and PTT.  Light touch sensation endorsed as intact in all dermatomes of the left foot.  3/4 easily palpable left DP and PT pulses.    Imaging:  Independent review of imaging was performed including a weightbearing bilateral knee AP radiograph, weightbearing AP and lateral right knee radiographs, and a nonweightbearing right knee radiograph dated 4/17/2025.   Imaging results were discussed with the patient.  MRI exams of the right knee and left foot were also reviewed and results discussed with the patient.  I showed her representative images.  Bone edema in the navicular.      1. Complex predominantly horizontal oblique tearing of the medial  meniscus; new from prior MRI 6/6/2022.  2. 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 Grade IV chondromalacia patellofemoral compartment with  decreased reactive bony change in the subchondral bone since prior  MRI.  3. Moderate joint effusion.     I reviewed her MRI in the room with her, this reads:  1. 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   2. No evidence of stress fracture of the fifth metatarsal.  3. Mild tendinopathy of the abductor digiti minimi and peroneus brevis  tendons at the base of the fifth metatarsal.      Impression:  Pleasant 59-year-old patient with:  - Symptomatic right knee osteoarthritis.  - Left foot pain after previous excision of apparent CN coalition. Possible etiologies:   - Subtalar arthrosis   - TN arthosis   - Skewfoot deformity including both hindfoot valgus and metatarsus adductus with mechanical left foot pain due to subtalar/sinus tarsi impingement and lateral foot overload.    Plan:  The findings and treatment options, both nonsurgical and surgical, were discussed with the patient in layman's terms.  Full opportunity was given to Ms. Jeffries to participate in the shared decision-making process.  Ice, rest, activity modifications, bracing, PT, cane use, oral and topical medications, foot orthoses, appropriate shoe wear, corticosteroid injections, and surgery were all discussed.  The risks, benefits, and alternative to surgery were discussed.  The potential risks of nonsurgical treatment were discussed as well.    Potential surgical options could include:  Right knee:  - Possible right total knee arthroplasty.  - Possible right unicompartmental knee arthroplasty.  - Between the two I told Ms. Jeffries I would recommend a total knee arthroplasty should she elect to proceed with a right knee procedure, but would be willing to entertain a UKA if she desired.  The risks, benefits, and alternatives of both options were discussed.  Left foot:  - MT add reconstruction?  - STF?  - LCL nonfusion?  - Mult met osteotomies  - Risks, benefits, and alternatives to surgery were discussed.    At the conclusion of our discussion the patient expressed her wish to proceed at this time with:  - Has custm Fos ordered, waiting  - Lidocaine patches  - Discussed scope/uka/tka, nonop  - Prn  -  This very pleasant patient verbalized understanding of the above discussion and agreement with the above plan, and all questions were answered.    Disclaimer:  This note consists of symbols derived from keyboarding, dictation,  and/or voice recognition software. As a result, although I do diligently check for accuracy, there may be errors in the script that have gone undetected. Please consider this when interpreting information found in this chart.

## 2025-05-12 NOTE — NURSING NOTE
"Reason for visit:   Chief Complaint   Patient presents with    Consult     Left foot and  (R) medial meniscus tear, surgical consult        Ht 1.6 m (5' 3\")   Wt 78.9 kg (174 lb)   LMP  (LMP Unknown)   BMI 30.82 kg/m      Distance patient can walk for 30 minutes at a time.      Patient pre-surgery profile    Non-operative treatments:  Injections Yes  Bracing Yes  Medications N/A  Physical Therapy Yes    Social considerations:  Occupation DME   Work status Full time   Living situation House with 2 floors   Friends/Family nearby Yes  Reliable transportation to and from appointments Yes  Hobbies/recreational activities hiking and reading   Tobacco/Nicotine use No  Alcohol use No  Illicit drug use No    BMI   Body mass index is 30.82 kg/m .                  Past Medical History:   Diagnosis Date    Bowel obstruction (H) 2016    [place]    Disorders of bursae and tendons in shoulder region, unspecified 2012    Enteritis     Hypertension 2021    Mild major depression 2019    Mixed hyperlipemias     PONV (postoperative nausea and vomiting)     Psoriasis      Past Surgical History:   Procedure Laterality Date    ABDOMEN SURGERY  2016    lysis of adhesions for bowel obstruction    C/SECTION, LOW TRANSVERSE      , Low Transverse    COLONOSCOPY      I cant remember the date. A few years ago.    COLONOSCOPY N/A 2023    Procedure: COLONOSCOPY, FLEXIBLE, WITH LESION REMOVAL USING SNARE;  Surgeon: Erika Delgado MD;  Location: [facility]    COLONOSCOPY WITH CO2 INSUFFLATION N/A 2023    Procedure: COLONOSCOPY, WITH CO2 INSUFFLATION;  Surgeon: Erika Delgado MD;  Location: [facility]    COMBINED ESOPHAGOSCOPY, GASTROSCOPY, DUODENOSCOPY (EGD) WITH CO2 INSUFFLATION N/A 3/11/2024    Procedure: Combined Esophagoscopy, Gastroscopy, Duodenoscopy (Egd) With Co2 Insufflation;  Surgeon: Sushma Burgos DO;  Location: [facility]    HYSTERECTOMY, VAGINAL  2006    has ovaries, due to bleeding "    ORTHOPEDIC SURGERY  10/2015    Right shoulder    SHOULDER SURGERY Right 10/03/2014    SAD/DCE    SURGICAL HISTORY OF -   2006    rectocele and TVT    SURGICAL HISTORY OF -       left lumpectomy     Blood Pressure Monitoring (BLOOD PRESSURE MONITOR 3) MICHELLE  clobetasol (TEMOVATE) 0.05 % external ointment  lisinopril (ZESTRIL) 40 MG tablet      Allergies   Allergen Reactions    No Known Drug Allergy      Family History  Family History   Problem Relation Age of Onset    Heart Disease Mother         defibrillator    Cardiovascular Mother         defibrillator    Diabetes Mother     Hyperlipidemia Mother     Cerebrovascular Disease Mother     Anesthesia Reaction Mother     Cardiovascular Father         valve replacement    Hypertension Father     Eye Disorder Father         maccular degeneration    Heart Disease Father         valve replacement    Lipids Father     Hyperlipidemia Father     Genitourinary Problems Brother         CKD    Diabetes Brother     Cancer Brother         leukemia    Other Cancer Brother         Lukemia    Breast Cancer Niece     Other Cancer Niece     Other Cancer Brother         Skin    Asthma Son     C.A.D. No family hx of     Cancer - colorectal No family hx of     Prostate Cancer No family hx of     Alzheimer Disease No family hx of     Arthritis No family hx of     Blood Disease No family hx of     Circulatory No family hx of     Gastrointestinal Disease No family hx of     Musculoskeletal Disorder No family hx of     Neurologic Disorder No family hx of     Respiratory No family hx of     Thyroid Disease No family hx of     Alcohol/Drug No family hx of     Allergies No family hx of     Congenital Anomalies No family hx of     Connective Tissue Disorder No family hx of     Depression No family hx of     Anxiety Disorder No family hx of     Mental Illness No family hx of     Substance Abuse No family hx of     Osteoporosis No family hx of     Genetic Disorder No family hx of     Obesity No  family hx of      Social History   Social History     Tobacco Use    Smoking status: Never    Smokeless tobacco: Never   Vaping Use    Vaping status: Never Used   Substance Use Topics    Alcohol use: Yes     Comment: Seldom    Drug use: No

## 2025-06-04 ENCOUNTER — TELEPHONE (OUTPATIENT)
Dept: FAMILY MEDICINE | Facility: CLINIC | Age: 60
End: 2025-06-04
Payer: COMMERCIAL

## 2025-06-04 NOTE — TELEPHONE ENCOUNTER
Patient Quality Outreach    Patient is due for the following:   Hypertension -  BP check  Physical Preventive Adult Physical    Action(s) Taken:   Patient has upcoming appointment, these items will be addressed at that time.    Type of outreach:    Chart review performed, no outreach needed.    Questions for provider review:    None         Laurie Han  Chart routed to None.

## 2025-06-23 SDOH — HEALTH STABILITY: PHYSICAL HEALTH: ON AVERAGE, HOW MANY MINUTES DO YOU ENGAGE IN EXERCISE AT THIS LEVEL?: 0 MIN

## 2025-06-23 SDOH — HEALTH STABILITY: PHYSICAL HEALTH: ON AVERAGE, HOW MANY DAYS PER WEEK DO YOU ENGAGE IN MODERATE TO STRENUOUS EXERCISE (LIKE A BRISK WALK)?: 0 DAYS

## 2025-06-23 ASSESSMENT — SOCIAL DETERMINANTS OF HEALTH (SDOH): HOW OFTEN DO YOU GET TOGETHER WITH FRIENDS OR RELATIVES?: ONCE A WEEK

## 2025-06-24 ENCOUNTER — OFFICE VISIT (OUTPATIENT)
Dept: FAMILY MEDICINE | Facility: CLINIC | Age: 60
End: 2025-06-24
Payer: COMMERCIAL

## 2025-06-24 VITALS
HEIGHT: 63 IN | WEIGHT: 175 LBS | BODY MASS INDEX: 31.01 KG/M2 | DIASTOLIC BLOOD PRESSURE: 86 MMHG | RESPIRATION RATE: 13 BRPM | OXYGEN SATURATION: 98 % | SYSTOLIC BLOOD PRESSURE: 134 MMHG | HEART RATE: 65 BPM | TEMPERATURE: 97.8 F

## 2025-06-24 DIAGNOSIS — I10 BENIGN ESSENTIAL HYPERTENSION: ICD-10-CM

## 2025-06-24 DIAGNOSIS — Z00.00 ROUTINE GENERAL MEDICAL EXAMINATION AT A HEALTH CARE FACILITY: Primary | ICD-10-CM

## 2025-06-24 DIAGNOSIS — M25.562 PAIN IN BOTH KNEES, UNSPECIFIED CHRONICITY: ICD-10-CM

## 2025-06-24 DIAGNOSIS — R73.01 IMPAIRED FASTING GLUCOSE: ICD-10-CM

## 2025-06-24 DIAGNOSIS — Z13.220 LIPID SCREENING: ICD-10-CM

## 2025-06-24 DIAGNOSIS — M25.561 PAIN IN BOTH KNEES, UNSPECIFIED CHRONICITY: ICD-10-CM

## 2025-06-24 DIAGNOSIS — M25.572 PAIN IN JOINT INVOLVING ANKLE AND FOOT, LEFT: ICD-10-CM

## 2025-06-24 LAB
ALBUMIN SERPL BCG-MCNC: 4.5 G/DL (ref 3.5–5.2)
ALP SERPL-CCNC: 110 U/L (ref 40–150)
ALT SERPL W P-5'-P-CCNC: 38 U/L (ref 0–50)
ANION GAP SERPL CALCULATED.3IONS-SCNC: 14 MMOL/L (ref 7–15)
AST SERPL W P-5'-P-CCNC: 28 U/L (ref 0–45)
BILIRUB SERPL-MCNC: 0.3 MG/DL
BUN SERPL-MCNC: 14.8 MG/DL (ref 6–20)
CALCIUM SERPL-MCNC: 10.2 MG/DL (ref 8.8–10.4)
CHLORIDE SERPL-SCNC: 102 MMOL/L (ref 98–107)
CHOLEST SERPL-MCNC: 278 MG/DL
CREAT SERPL-MCNC: 0.78 MG/DL (ref 0.51–0.95)
EGFRCR SERPLBLD CKD-EPI 2021: 88 ML/MIN/1.73M2
EST. AVERAGE GLUCOSE BLD GHB EST-MCNC: 117 MG/DL
FASTING STATUS PATIENT QL REPORTED: YES
FASTING STATUS PATIENT QL REPORTED: YES
GLUCOSE SERPL-MCNC: 107 MG/DL (ref 70–99)
HBA1C MFR BLD: 5.7 % (ref 0–5.6)
HCO3 SERPL-SCNC: 25 MMOL/L (ref 22–29)
HDLC SERPL-MCNC: 66 MG/DL
LDLC SERPL CALC-MCNC: 185 MG/DL
NONHDLC SERPL-MCNC: 212 MG/DL
POTASSIUM SERPL-SCNC: 4.6 MMOL/L (ref 3.4–5.3)
PROT SERPL-MCNC: 7.4 G/DL (ref 6.4–8.3)
SODIUM SERPL-SCNC: 141 MMOL/L (ref 135–145)
TRIGL SERPL-MCNC: 134 MG/DL

## 2025-06-24 PROCEDURE — 80061 LIPID PANEL: CPT | Performed by: FAMILY MEDICINE

## 2025-06-24 PROCEDURE — G2211 COMPLEX E/M VISIT ADD ON: HCPCS | Performed by: FAMILY MEDICINE

## 2025-06-24 PROCEDURE — 99213 OFFICE O/P EST LOW 20 MIN: CPT | Mod: 25 | Performed by: FAMILY MEDICINE

## 2025-06-24 PROCEDURE — 83036 HEMOGLOBIN GLYCOSYLATED A1C: CPT | Performed by: FAMILY MEDICINE

## 2025-06-24 PROCEDURE — 80053 COMPREHEN METABOLIC PANEL: CPT | Performed by: FAMILY MEDICINE

## 2025-06-24 PROCEDURE — 36415 COLL VENOUS BLD VENIPUNCTURE: CPT | Performed by: FAMILY MEDICINE

## 2025-06-24 PROCEDURE — 99396 PREV VISIT EST AGE 40-64: CPT | Performed by: FAMILY MEDICINE

## 2025-06-24 RX ORDER — LISINOPRIL 40 MG/1
40 TABLET ORAL DAILY
Qty: 90 TABLET | Refills: 3 | Status: SHIPPED | OUTPATIENT
Start: 2025-06-24

## 2025-06-24 ASSESSMENT — PAIN SCALES - GENERAL: PAINLEVEL_OUTOF10: SEVERE PAIN (7)

## 2025-06-24 NOTE — PATIENT INSTRUCTIONS
Patient Education   Preventive Care Advice   This is general advice given by our system to help you stay healthy. However, your care team may have specific advice just for you. Please talk to your care team about your preventive care needs.  Nutrition  Eat 5 or more servings of fruits and vegetables each day.  Try wheat bread, brown rice and whole grain pasta (instead of white bread, rice, and pasta).  Get enough calcium and vitamin D. Check the label on foods and aim for 100% of the RDA (recommended daily allowance).  Lifestyle  Exercise at least 150 minutes each week  (30 minutes a day, 5 days a week).  Do muscle strengthening activities 2 days a week. These help control your weight and prevent disease.  No smoking.  Wear sunscreen to prevent skin cancer.  Have a dental exam and cleaning every 6 months.  Yearly exams  See your health care team every year to talk about:  Any changes in your health.  Any medicines your care team has prescribed.  Preventive care, family planning, and ways to prevent chronic diseases.  Shots (vaccines)   HPV shots (up to age 26), if you've never had them before.  Hepatitis B shots (up to age 59), if you've never had them before.  COVID-19 shot: Get this shot when it's due.  Flu shot: Get a flu shot every year.  Tetanus shot: Get a tetanus shot every 10 years.  Pneumococcal, hepatitis A, and RSV shots: Ask your care team if you need these based on your risk.  Shingles shot (for age 50 and up)  General health tests  Diabetes screening:  Starting at age 35, Get screened for diabetes at least every 3 years.  If you are younger than age 35, ask your care team if you should be screened for diabetes.  Cholesterol test: At age 39, start having a cholesterol test every 5 years, or more often if advised.  Bone density scan (DEXA): At age 50, ask your care team if you should have this scan for osteoporosis (brittle bones).  Hepatitis C: Get tested at least once in your life.  STIs (sexually  transmitted infections)  Before age 24: Ask your care team if you should be screened for STIs.  After age 24: Get screened for STIs if you're at risk. You are at risk for STIs (including HIV) if:  You are sexually active with more than one person.  You don't use condoms every time.  You or a partner was diagnosed with a sexually transmitted infection.  If you are at risk for HIV, ask about PrEP medicine to prevent HIV.  Get tested for HIV at least once in your life, whether you are at risk for HIV or not.  Cancer screening tests  Cervical cancer screening: If you have a cervix, begin getting regular cervical cancer screening tests starting at age 21.  Breast cancer scan (mammogram): If you've ever had breasts, begin having regular mammograms starting at age 40. This is a scan to check for breast cancer.  Colon cancer screening: It is important to start screening for colon cancer at age 45.  Have a colonoscopy test every 10 years (or more often if you're at risk) Or, ask your provider about stool tests like a FIT test every year or Cologuard test every 3 years.  To learn more about your testing options, visit:   .  For help making a decision, visit:   https://bit.ly/zh12463.  Prostate cancer screening test: If you have a prostate, ask your care team if a prostate cancer screening test (PSA) at age 55 is right for you.  Lung cancer screening: If you are a current or former smoker ages 50 to 80, ask your care team if ongoing lung cancer screenings are right for you.  For informational purposes only. Not to replace the advice of your health care provider. Copyright   2023 [employer] All rights reserved. Clinically reviewed by the [facility]. Workable 556122 - REV 01/24.

## 2025-06-24 NOTE — PROGRESS NOTES
Preventive Care Visit  [facility] DEE Delgado MD, Family Medicine  Jun 24, 2025      Assessment & Plan     ASSESSMENT/ORDERS:    ICD-10-CM    1. Routine general medical examination at a health care facility  Z00.00       2. Benign essential hypertension  I10 lisinopril (ZESTRIL) 40 MG tablet     Comprehensive metabolic panel (BMP + Alb, Alk Phos, ALT, AST, Total. Bili, TP)     Comprehensive metabolic panel (BMP + Alb, Alk Phos, ALT, AST, Total. Bili, TP)     OFFICE/OUTPT VISIT,EST,LEVL III      3. Pain in joint involving ankle and foot, left  M25.572       4. Pain in both knees, unspecified chronicity  M25.561     M25.562       5. Impaired fasting glucose  R73.01 Hemoglobin A1c     Hemoglobin A1c      6. Lipid screening  Z13.220 Lipid panel reflex to direct LDL Fasting     Lipid panel reflex to direct LDL Fasting          Assessment & Plan  Routine general medical examination:  Routine examination conducted.  Conduct normal physical exam, screening cholesterol, blood sugar, kidney function, electrolytes, and A1c. No immunizations today. Labs to be done post-visit.    Benign essential hypertension:  Blood pressure appears stable with occasional high readings. No issues with current medication, lisinopril, when taken with food.  Continue monitoring blood pressure. No changes to current medication regimen.    Pain in joint involving ankle and foot, left:  Persistent pain and numbness in the left foot post-surgery. Concerns about foot deformity and previous surgical outcomes.  Appointment scheduled at [facility] on July 28, 2025, for further evaluation. Consideration of further podiatry consultation if needed.    Bilateral knee pain  Reports being told she needs knee replacements.   She has chronic discomfort.   She will reach out here as needed.   Continue follow up with surgery.     Impaired fasting glucose:  Previous A1c in pre-diabetes range.  Recheck A1c. Discussed lifestyle  "modifications including diet and exercise. Referral to a dietician/nutritionist offered if needed.    Lipid screening:  Lipid screening part of routine examination.  Conduct lipid screening as part of lab work.    The longitudinal plan of care for the diagnosis(es)/condition(s) as documented were addressed during this visit. Due to the added complexity in care, I will continue to support Gabriella in the subsequent management and with ongoing continuity of care.              BMI  Estimated body mass index is 30.91 kg/m  as calculated from the following:    Height as of this encounter: 1.602 m (5' 3.09\").    Weight as of this encounter: 79.4 kg (175 lb).   Weight management plan: Discussed healthy diet and exercise guidelines  Reviewed preventive health counseling, as reflected in patient instructions       Regular exercise       Healthy diet/nutrition  Counseling  Appropriate preventive services were addressed with this patient via screening, questionnaire, or discussion as appropriate for fall prevention, nutrition, physical activity, Tobacco-use cessation, social engagement, weight loss and cognition.  Checklist reviewing preventive services available has been given to the patient.  Reviewed patient's diet, addressing concerns and/or questions.             Subjective   Gabriella is a 59 year old, presenting for the following:  Physical        6/24/2025     9:15 AM   Additional Questions   Roomed by AD   Accompanied by Self          History of Present Illness       Hypertension: She presents for follow up of hypertension.  She does check blood pressure  regularly outside of the clinic. Outside blood pressures have been over 140/90. She follows a low salt diet.      - Gabriella Jeffries, 59-year-old female  - Right knee and left knee issues, referred to a surgeon for meniscus problems; knee deemed too damaged for meniscus repair  - Referred to Doctor Burt for knee replacement, felt overwhelmed by the information provided  - " Frustration due to lack of clear guidance and delayed chart updates  - Concerned about left foot, experiencing numbness and pain  - Had foot surgery at [facility] over a year ago, unsatisfactory outcome, persistent symptoms  - Diagnosed with foot deformity by Doctor Carmel, proposed extensive surgery involving bone breaking  - Blood pressure occasionally high, feels heightened or anxious when elevated, no pain, headaches, or chest pain  - Takes lisinopril with food to avoid upset stomach  - Had RSV earlier, symptoms persisted  - Works in a cold office environment, experiences stiffness and pain, wears warm clothing to manage discomfort  - Left ankle swelling reported  - Previously consulted a dietician over a year ago, received dietary advice  - No current interest in COVID vaccines, considering shingles and pneumonia vaccines          BP Readings from Last 2 Encounters:   06/24/25 134/86   04/23/24 122/80       Advance Care Planning    Discussed advance care planning with patient; informed AVS has link to [employer].        6/23/2025   General Health   How would you rate your overall physical health? Good   Feel stress (tense, anxious, or unable to sleep) Only a little   (!) STRESS CONCERN      6/23/2025   Nutrition   Three or more servings of calcium each day? Yes   Diet: Regular (no restrictions)   How many servings of fruit and vegetables per day? (!) 2-3   How many sweetened beverages each day? 0-1         6/23/2025   Exercise   Days per week of moderate/strenous exercise 0 days   Average minutes spent exercising at this level 0 min   (!) EXERCISE CONCERN      6/23/2025   Social Factors   Frequency of gathering with friends or relatives Once a week   Worry food won't last until get money to buy more No   Food not last or not have enough money for food? No   Do you have housing? (Housing is defined as stable permanent housing and does not include staying outside in a car, in a tent, in an abandoned building, in an  overnight shelter, or couch-surfing.) Yes   Are you worried about losing your housing? No   Lack of transportation? No   Unable to get utilities (heat,electricity)? No         6/23/2025   Fall Risk   Fallen 2 or more times in the past year? No   Trouble with walking or balance? No          6/23/2025   Dental   Dentist two times every year? Yes           Today's PHQ-2 Score:       1/20/2025     1:29 PM   PHQ-2 ( 1999 [employer])   Q1: Little interest or pleasure in doing things 0   Q2: Feeling down, depressed or hopeless 0   PHQ-2 Score 0         6/23/2025   Substance Use   Alcohol more than 3/day or more than 7/wk No   Do you use any other substances recreationally? No     Social History     Tobacco Use    Smoking status: Never    Smokeless tobacco: Never   Vaping Use    Vaping status: Never Used   Substance Use Topics    Alcohol use: Yes     Comment: Seldom    Drug use: No           3/29/2024   LAST FHS-7 RESULTS   1st degree relative breast or ovarian cancer No   Any relative bilateral breast cancer Yes   Any male have breast cancer No   Any ONE woman have BOTH breast AND ovarian cancer No   Any woman with breast cancer before 50yrs Yes   2 or more relatives with breast AND/OR ovarian cancer No   2 or more relatives with breast AND/OR bowel cancer No        Mammogram Screening - Mammogram every 1-2 years updated in Health Maintenance based on mutual decision making        6/23/2025   STI Screening   New sexual partner(s) since last STI/HIV test? No     History of abnormal Pap smear: Status post hysterectomy with removal of cervix and no history of CIN2 or greater or cervical cancer. Health Maintenance and Surgical History updated.       ASCVD Risk   The 10-year ASCVD risk score (Neville LOPEZ, et al., 2019) is: 4.7%    Values used to calculate the score:      Age: 59 years      Sex: Female      Is Non- : No      Diabetic: No      Tobacco smoker: No      Systolic Blood Pressure: 134 mmHg     "  Is BP treated: Yes      HDL Cholesterol: 56 mg/dL      Total Cholesterol: 248 mg/dL           Reviewed and updated as needed this visit by Provider   Tobacco  Allergies  Meds  Problems  Med Hx  Surg Hx  Fam Hx                     Objective    Exam  /86   Pulse 65   Temp 97.8  F (36.6  C) (Temporal)   Resp 13   Ht 1.602 m (5' 3.09\")   Wt 79.4 kg (175 lb)   LMP  (LMP Unknown)   SpO2 98%   Breastfeeding No   BMI 30.91 kg/m     Estimated body mass index is 30.91 kg/m  as calculated from the following:    Height as of this encounter: 1.602 m (5' 3.09\").    Weight as of this encounter: 79.4 kg (175 lb).    Physical Exam  GENERAL: alert and no distress  EYES: Eyes grossly normal to inspection, PERRL and conjunctivae and sclerae normal  HENT: ear canals and TM's normal, nose and mouth without ulcers or lesions  NECK: no adenopathy, no asymmetry, masses, or scars  BREAST: no lumps, no lymphadenopathy  RESP: lungs clear to auscultation - no rales, rhonchi or wheezes  CV: regular rate and rhythm, normal S1 S2, no S3 or S4, no murmur, click or rub, no peripheral edema  ABDOMEN: soft, nontender, no hepatosplenomegaly, no masses and bowel sounds normal  MS: no gross musculoskeletal defects noted, no edema  SKIN: no suspicious lesions or rashes  NEURO: Normal strength and tone, mentation intact and speech normal  PSYCH: mentation appears normal, affect normal/bright        Signed Electronically by: Erika Delgado MD    "

## 2025-06-25 ENCOUNTER — RESULTS FOLLOW-UP (OUTPATIENT)
Dept: FAMILY MEDICINE | Facility: CLINIC | Age: 60
End: 2025-06-25
Payer: COMMERCIAL

## 2025-06-25 DIAGNOSIS — E78.2 MIXED HYPERLIPIDEMIA: Primary | ICD-10-CM

## 2025-06-25 DIAGNOSIS — Z82.49 FAMILY HISTORY OF CORONARY ARTERY DISEASE: ICD-10-CM

## 2025-06-30 ENCOUNTER — PATIENT OUTREACH (OUTPATIENT)
Dept: CARE COORDINATION | Facility: CLINIC | Age: 60
End: 2025-06-30
Payer: COMMERCIAL

## 2025-07-13 ENCOUNTER — HEALTH MAINTENANCE LETTER (OUTPATIENT)
Age: 60
End: 2025-07-13

## 2025-07-24 ENCOUNTER — MYC MEDICAL ADVICE (OUTPATIENT)
Dept: FAMILY MEDICINE | Facility: CLINIC | Age: 60
End: 2025-07-24
Payer: COMMERCIAL

## 2025-08-20 ENCOUNTER — ANCILLARY PROCEDURE (OUTPATIENT)
Dept: CT IMAGING | Facility: CLINIC | Age: 60
End: 2025-08-20
Attending: FAMILY MEDICINE
Payer: COMMERCIAL

## 2025-08-20 DIAGNOSIS — E78.2 MIXED HYPERLIPIDEMIA: ICD-10-CM

## 2025-08-20 DIAGNOSIS — Z82.49 FAMILY HISTORY OF CORONARY ARTERY DISEASE: ICD-10-CM

## 2025-08-20 PROCEDURE — 75571 CT HRT W/O DYE W/CA TEST: CPT | Mod: GA | Performed by: STUDENT IN AN ORGANIZED HEALTH CARE EDUCATION/TRAINING PROGRAM

## (undated) DEVICE — SOL WATER IRRIG 1000ML BOTTLE 07139-09

## (undated) RX ORDER — FENTANYL CITRATE 50 UG/ML
INJECTION, SOLUTION INTRAMUSCULAR; INTRAVENOUS
Status: DISPENSED
Start: 2023-02-17

## (undated) RX ORDER — SIMETHICONE 40MG/0.6ML
SUSPENSION, DROPS(FINAL DOSAGE FORM)(ML) ORAL
Status: DISPENSED
Start: 2023-02-17

## (undated) RX ORDER — SIMETHICONE 40MG/0.6ML
SUSPENSION, DROPS(FINAL DOSAGE FORM)(ML) ORAL
Status: DISPENSED
Start: 2024-03-11